# Patient Record
Sex: FEMALE | Race: WHITE | HISPANIC OR LATINO | Employment: UNEMPLOYED | URBAN - METROPOLITAN AREA
[De-identification: names, ages, dates, MRNs, and addresses within clinical notes are randomized per-mention and may not be internally consistent; named-entity substitution may affect disease eponyms.]

---

## 2017-11-29 ENCOUNTER — ALLSCRIPTS OFFICE VISIT (OUTPATIENT)
Dept: OTHER | Facility: OTHER | Age: 52
End: 2017-11-29

## 2017-11-29 ENCOUNTER — GENERIC CONVERSION - ENCOUNTER (OUTPATIENT)
Dept: OTHER | Facility: OTHER | Age: 52
End: 2017-11-29

## 2017-11-29 DIAGNOSIS — Z12.31 ENCOUNTER FOR SCREENING MAMMOGRAM FOR MALIGNANT NEOPLASM OF BREAST: ICD-10-CM

## 2017-11-29 DIAGNOSIS — N93.8 OTHER SPECIFIED ABNORMAL UTERINE AND VAGINAL BLEEDING: ICD-10-CM

## 2017-11-29 DIAGNOSIS — Z13.820 ENCOUNTER FOR SCREENING FOR OSTEOPOROSIS: ICD-10-CM

## 2017-11-30 ENCOUNTER — GENERIC CONVERSION - ENCOUNTER (OUTPATIENT)
Dept: OTHER | Facility: OTHER | Age: 52
End: 2017-11-30

## 2017-11-30 LAB
A/G RATIO (HISTORICAL): 1.9 (ref 1.2–2.2)
ALBUMIN SERPL BCP-MCNC: 4.7 G/DL (ref 3.5–5.5)
ALP SERPL-CCNC: 49 IU/L (ref 39–117)
ALT SERPL W P-5'-P-CCNC: 16 IU/L (ref 0–32)
AST SERPL W P-5'-P-CCNC: 20 IU/L (ref 0–40)
BILIRUB SERPL-MCNC: 0.3 MG/DL (ref 0–1.2)
BUN SERPL-MCNC: 9 MG/DL (ref 6–24)
BUN/CREA RATIO (HISTORICAL): 11 (ref 9–23)
C-REACT.PROTEIN,QUANT (HISTORICAL): <0.3 MG/L (ref 0–4.9)
CALCIUM SERPL-MCNC: 9.5 MG/DL (ref 8.7–10.2)
CHLORIDE SERPL-SCNC: 100 MMOL/L (ref 96–106)
CHOLEST SERPL-MCNC: 198 MG/DL (ref 100–199)
CHOLEST/HDLC SERPL: 2.3 RATIO UNITS (ref 0–4.4)
CO2 SERPL-SCNC: 28 MMOL/L (ref 18–29)
CREAT SERPL-MCNC: 0.81 MG/DL (ref 0.57–1)
DEPRECATED RDW RBC AUTO: 13.1 % (ref 12.3–15.4)
EGFR AFRICAN AMERICAN (HISTORICAL): 97 ML/MIN/1.73
EGFR-AMERICAN CALC (HISTORICAL): 84 ML/MIN/1.73
ERYTHROCYTE SEDIMENTATION RATE (HISTORICAL): 4 MM/HR (ref 0–40)
GLUCOSE SERPL-MCNC: 89 MG/DL (ref 65–99)
HCT VFR BLD AUTO: 42.7 % (ref 34–46.6)
HDLC SERPL-MCNC: 85 MG/DL
HGB BLD-MCNC: 14.6 G/DL (ref 11.1–15.9)
LDLC SERPL CALC-MCNC: 98 MG/DL (ref 0–99)
MCH RBC QN AUTO: 30.2 PG (ref 26.6–33)
MCHC RBC AUTO-ENTMCNC: 34.2 G/DL (ref 31.5–35.7)
MCV RBC AUTO: 88 FL (ref 79–97)
PLATELET # BLD AUTO: 349 X10E3/UL (ref 150–379)
POTASSIUM SERPL-SCNC: 3.7 MMOL/L (ref 3.5–5.2)
RBC (HISTORICAL): 4.84 X10E6/UL (ref 3.77–5.28)
SODIUM SERPL-SCNC: 138 MMOL/L (ref 134–144)
TOT. GLOBULIN, SERUM (HISTORICAL): 2.5 G/DL (ref 1.5–4.5)
TOTAL PROTEIN (HISTORICAL): 7.2 G/DL (ref 6–8.5)
TRIGL SERPL-MCNC: 73 MG/DL (ref 0–149)
TSH SERPL DL<=0.05 MIU/L-ACNC: 0.69 UIU/ML (ref 0.45–4.5)
VLDLC SERPL CALC-MCNC: 15 MG/DL (ref 5–40)
WBC # BLD AUTO: 5 X10E3/UL (ref 3.4–10.8)

## 2017-12-07 ENCOUNTER — HOSPITAL ENCOUNTER (OUTPATIENT)
Dept: RADIOLOGY | Facility: HOSPITAL | Age: 52
Discharge: HOME | End: 2017-12-07
Payer: COMMERCIAL

## 2017-12-07 DIAGNOSIS — Z12.31 ENCOUNTER FOR SCREENING MAMMOGRAM FOR MALIGNANT NEOPLASM OF BREAST: ICD-10-CM

## 2017-12-07 PROCEDURE — G0202 SCR MAMMO BI INCL CAD: HCPCS

## 2017-12-10 ENCOUNTER — GENERIC CONVERSION - ENCOUNTER (OUTPATIENT)
Dept: OTHER | Facility: OTHER | Age: 52
End: 2017-12-10

## 2018-01-10 NOTE — RESULT NOTES
Discussion/Summary   LEONORA,      REVIEWED BW RESULTS   ALL LOOKED GREAT!! KEEP UP THE GOOD WORK      DR GUO     Verified Results  (1) CBC/ PLT (NO DIFF) [id number] 12:00AM [facility]     Test Name Result Flag Reference   WBC 5 0 x10E3/uL  3 4-10 8   RBC 4 84 x10E6/uL  3 77-5 28   Hemoglobin 14 6 g/dL  11 1-15 9   **Effective December 4, 2017 the reference interval**                   for Hemoglobin MALES only will be changing to: Males 13-15 years: 12 6 - 17 7                                         Males   >15 years: 13 0 - 17 7   Hematocrit 42 7 %  34 0-46  6   MCV 88 fL  79-97   MCH 30 2 pg  26 6-33 0   MCHC 34 2 g/dL  31 5-35 7   RDW 13 1 %  12 3-15 4   Platelets 446 N36I2/KO  150-379     (1) COMPREHENSIVE METABOLIC PANEL [id number] 86:67FA [facility]     Test Name Result Flag Reference   Glucose, Serum 89 mg/dL  65-99   BUN 9 mg/dL  6-24   Creatinine, Serum 0 81 mg/dL  0 57-1 00   BUN/Creatinine Ratio 11  9-23   Sodium, Serum 138 mmol/L  134-144   Potassium, Serum 3 7 mmol/L  3 5-5 2   Chloride, Serum 100 mmol/L     Carbon Dioxide, Total 28 mmol/L  18-29   Calcium, Serum 9 5 mg/dL  8 7-10 2   Protein, Total, Serum 7 2 g/dL  6 0-8 5   Albumin, Serum 4 7 g/dL  3 5-5 5   Globulin, Total 2 5 g/dL  1 5-4 5   A/G Ratio 1 9  1 2-2 2   Bilirubin, Total 0 3 mg/dL  0 0-1 2   Alkaline Phosphatase, S 49 IU/L     AST (SGOT) 20 IU/L  0-40   ALT (SGPT) 16 IU/L  0-32   eGFR If NonAfricn Am 84 mL/min/1 73  >59   eGFR If Africn Am 97 mL/min/1 73  >59     (1) C-REACTIVE PROTEIN [id number] 12:00AM [facility]     Test Name Result Flag Reference   C-Reactive Protein, Quant <0 3 mg/L  0 0-4 9     (1) LIPID PANEL, FASTING [id number] 12:00AM [facility]     Test Name Result Flag Reference   Cholesterol, Total 198 mg/dL  100-199   Triglycerides 73 mg/dL  0-149   HDL Cholesterol 85 mg/dL  >39   VLDL Cholesterol [place] 15 mg/dL  5-40   LDL Cholesterol Calc 98 mg/dL  0-99 T  Chol/HDL Ratio 2 3 ratio units  0 0-4 4   T  Chol/HDL Ratio                                                             Men  Women                                               1/2 Avg  Risk  3 4    3 3                                                   Avg Risk  5 0    4 4                                                2X Avg  Risk  9 6    7 1                                                3X Avg  Risk 23 4   11 0     (1) TSH [id number] 12:00[facility]     Test Name Result Flag Reference   TSH 0 689 uIU/mL  0 450-4 500     General acute hospital) Sedimentation Rate-Westergren [id number] 12:00AM [facility]     Test Name Result Flag Reference   Sedimentation Rate-Westergren 4 mm/hr  0-40

## 2018-01-17 NOTE — PROGRESS NOTES
Assessment    1  Encounter for preventive health examination (V70 0) (Z00 00)   2  Screening for hypertension (V81 1) (Z13 6)   3  Screening for hyperlipidemia (V77 91) (Z13 220)   4  Screening for hypothyroidism (V77 0) (Z13 29)   5  Special screening for malignant neoplasm of colon (V76 51) (Z12 11)   6  Encounter for screening mammogram for breast cancer (V76 12) (Z12 31)   7  Screening for osteoporosis (V82 81) (Z13 820)   8  Dysfunctional uterine bleeding (626 8) (N93 8)    Plan  Dysfunctional uterine bleeding    · US PELVIS COMPLETE NON OB; Status:Hold For - Scheduling; Requested  for:29Nov2017;   Encounter for screening mammogram for breast cancer    · * MAMMO SCREENING BILATERAL W CAD; Status:Hold For - Scheduling; Requested  for:29Nov2017; Health Maintenance    · Always use a seat belt and shoulder strap when riding or driving a motor vehicle ;  Status:Complete;   Done: 87FQZ3769   · Begin a limited exercise program ; Status:Complete;   Done: 70GJZ6969   · Drink plenty of fluids ; Status:Complete;   Done: 73QLS7796   · Eat a low fat and low cholesterol diet ; Status:Complete;   Done: 27UDM0504   · Eat a normal well-balanced diet ; Status:Complete;   Done: 58FEH6566   · Use a sun block product with an SPF of 15 or more ; Status:Complete;   Done:  72MKK0029   · We encourage all of our patients to exercise regularly  30 minutes of exercise or physical  activity five or more days a week is recommended for children and adults ;  Status:Complete;   Done: 99WVS3236   · We recommend routine visits to a dentist ; Status:Complete;   Done: 84ATR2396   · We recommend that you bring your body mass index down to 26 ; Status:Complete;    Done: 35GKX6226   · We recommend that you follow these steps to lower your risk of osteoporosis  ;  Status:Complete;   Done: 75IEW3029   · Follow-up visit in 1 year Evaluation and Treatment  Follow-up  Status: Complete  Done:  41GAN4217  Health Maintenance, Screening for hyperlipidemia, Screening for hypertension,  Screening for hypothyroidism    · (1) CBC/ PLT (NO DIFF); Status: In Progress - Specimen/Data Collected;   Done:  05IPD3992   · (1) COMPREHENSIVE METABOLIC PANEL; Status: In Progress - Specimen/Data  Collected;   Done: 00WEL4095   · (1) C-REACTIVE PROTEIN; Status: In Progress - Specimen/Data Collected;   Done:  21TIQ4591   · (1) LIPID PANEL, FASTING; Status: In Progress - Specimen/Data Collected;   Done:  89XMD2311   · (1) SED RATE; Status: In Progress - Specimen/Data Collected;   Done: 26SMH4814   · (1) TSH; Status: In Progress - Specimen/Data Collected;   Done: 14MNR7086   · EKG/ECG- POC; Status:Active - Perform Order; Requested for:29Nov2017;    · Routine Venipuncture - POC; Status:Complete;   Done: 64RBX0004   · Urine Dip Automated- POC; Status:Active - Perform Order; Requested for:29Nov2017;   Screening for osteoporosis    · DXA BODY COMP ANALYSIS; Status:Hold For - Scheduling; Requested for:29Nov2017;   Situational anxiety    · From  Xanax 0 25 MG Oral Tablet  To ALPRAZolam 0 25 MG Oral Tablet  (Xanax) TAKE 1 TABLET BY MOUTH THREE TIMES DAILY AS NEEDED  Special screening for malignant neoplasm of colon    · (1) Avril Brewer; Status:Active; Requested for:29Nov2017;   cont  : [facility] to obtain reimbursement for      Cologuard and to directly contact and collect a second sample from the paient      if reportable results are not obtained from the initial sample   cont  : I accept responsibility for maintaining the privacy of test results and      related information as required by HIPAA   : By ordering Cologuard, I certify that I am a licensed medical professional      authorized to order Cologuard  I acknowledge that the test is medically necessary and      that the patient is eligible to use Cologuard  Discussion/Summary  health maintenance visit Currently, she eats a healthy diet   the risks and benefits of cervical cancer screening were discussed Breast cancer screening: the risks and benefits of breast cancer screening were discussed and mammogram has been ordered  Colorectal cancer screening: the risks and benefits of colorectal cancer screening were discussed  Osteoporosis screening: bone mineral density testing has been ordered  Screening lab work includes hemoglobin, glucose, lipid profile, thyroid function testing and urinalysis  Advice and education were given regarding sunscreen use and seat belt use  DISCUSSED HEALTH ISSUES  HEALTHY DIET AND EXERCISE  BW WILL BE OBTAINED  MAMMOGRAPHY   RECOMMEND CALCIUM 8887-4267 MG DAILY  VITAMIN D3 1000 IU DAILY  RV IN 1 YEAR FOR ANNUAL EXAM, SOONER IF NEEDED    The treatment plan was reviewed with the patient/guardian  The patient/guardian understands and agrees with the treatment plan      History of Present Illness  HM, Adult Female: The patient is being seen for a health maintenance evaluation  General Health: The patient's health since the last visit is described as good  She has regular dental visits  She denies vision problems  She denies hearing loss  Lifestyle:  She consumes a diverse and healthy diet  She does not have any weight concerns  She does not exercise regularly  She does not use tobacco  She denies alcohol use  She denies drug use  Screening: cancer screening reviewed and current  metabolic screening reviewed and current  risk screening reviewed and current  HPI: [address] [place] [address] Rd RECORD  MENSTRUAL ISSUES      Review of Systems    Constitutional: no fever, no chills and not feeling tired  Eyes: no eyesight problems  ENT: no sore throat and no nasal discharge  Cardiovascular: no chest pain, the heart rate was not fast, no palpitations and no lower extremity edema  Respiratory: no shortness of breath, no cough, no wheezing and no shortness of breath during exertion     Gastrointestinal: no abdominal pain, no nausea, no vomiting and no diarrhea  Genitourinary: unexplained vaginal bleeding, but no dysuria, no vaginal discharge and no dysmenorrhea  Musculoskeletal: joint stiffness, but no arthralgias  Integumentary: no rashes  Neurological: no headache, no numbness, no tingling and no dizziness  Psychiatric: no anxiety  Endocrine: no muscle weakness  Hematologic/Lymphatic: no swollen glands  ROS reviewed  Active Problems    1  Screening for hyperlipidemia (V77 91) (Z13 220)   2  Screening for hypertension (V81 1) (Z13 6)   3  Screening for hypothyroidism (V77 0) (Z13 29)    Past Medical History    · History of Celiac disease (579 0) (K90 0)    Family History  Mother    · No pertinent family history  Family History    · Family history of cardiac disorder (V17 49) (Z82 49)   · Family history of diabetes mellitus (V18 0) (Z83 3)   · Family history of malignant neoplasm (V16 9) (Z80 9)    Social History    · Alcohol use (V49 89) (Z78 9)   · Former smoker (E33 17) (R58 467)   ·     Current Meds   1  Vitamin B12 TABS; Therapy: (Recorded:11Qwy2451) to Recorded   2  Xanax 0 25 MG Oral Tablet; Therapy: (Recorded:51Vjh7221) to Recorded    Allergies    1  No Known Drug Allergies    2  Wheat    Vitals   Recorded: 75DLU9125 02:48PM   Temperature 99 6 F   Heart Rate 76   Respiration 16   Systolic 516   Diastolic 80   Height 5 ft 4 in   Weight 170 lb    BMI Calculated 29 18   BSA Calculated 1 83     Physical Exam    Constitutional   General appearance: No acute distress, well appearing and well nourished  Head and Face   Head and face: Normal     Eyes   Conjunctiva and lids: No swelling, erythema or discharge  Pupils and irises: Equal, round, reactive to light  Ophthalmoscopic examination: Normal fundi and optic discs  Ears, Nose, Mouth, and Throat   External inspection of ears and nose: Normal     Otoscopic examination: Tympanic membranes translucent with normal light reflex  Canals patent without erythema  Hearing: Normal     Nasal mucosa, septum, and turbinates: Normal without edema or erythema  Oropharynx: Normal with no erythema, edema, exudate or lesions  Neck   Neck: Supple, symmetric, trachea midline, no masses  Thyroid: Normal, no thyromegaly  Pulmonary   Respiratory effort: No increased work of breathing or signs of respiratory distress  Auscultation of lungs: Clear to auscultation  Cardiovascular   Auscultation of heart: Normal rate and rhythm, normal S1 and S2, no murmurs  Carotid pulses: 2+ bilaterally  Abdominal aorta: Normal     Femoral pulses: 2+ bilaterally  Pedal pulses: 2+ bilaterally  Peripheral vascular exam: Normal     Examination of extremities for edema and/or varicosities: Normal     Chest   Palpation of breasts and axillae: Normal, no masses palpated  Abdomen   Abdomen: Non-tender, no masses  Liver and spleen: No hepatomegaly or splenomegaly  Examination for hernias: No hernia appreciated  Lymphatic   Palpation of lymph nodes in neck: No lymphadenopathy  Palpation of lymph nodes in axillae: No lymphadenopathy  Palpation of lymph nodes in groin: No lymphadenopathy  Musculoskeletal   Gait and station: Normal     Digits and nails: Normal without clubbing or cyanosis  Joints, bones, and muscles: Normal     Range of motion: Normal     Stability: Normal     Muscle strength/tone: Normal     Skin   Skin and subcutaneous tissue: Normal without rashes or lesions  Neurologic   Cranial nerves: Cranial nerves II-XII intact  Cortical function: Normal mental status  Reflexes: 2+ and symmetric  Sensation: No sensory loss  Coordination: Normal finger to nose and heel to shin      Psychiatric   Judgment and insight: Normal     Orientation to person, place, and time: Normal     Mood and affect: Normal        Signatures   Electronically signed by : Starla Arciniega MD; Nov 29 2017  3:35PM EST                       (Author)

## 2018-01-22 VITALS
WEIGHT: 170 LBS | HEART RATE: 76 BPM | TEMPERATURE: 99.6 F | SYSTOLIC BLOOD PRESSURE: 120 MMHG | BODY MASS INDEX: 29.02 KG/M2 | RESPIRATION RATE: 16 BRPM | DIASTOLIC BLOOD PRESSURE: 80 MMHG | HEIGHT: 64 IN

## 2018-01-23 NOTE — RESULT NOTES
Verified Results  * MAMMO SCREENING BILATERAL W CAD 30GPK5382 06:46PM Hector Lora Order Number: [id number]    - Patient Instructions: To schedule this appointment, please contact Central Scheduling at [phone]  Do not wear any perfume, powder, lotion or deodorant on breast or underarm area  Please bring your doctors order, referral (if needed) and insurance information with you on the day of the test  Failure to bring this information may result in this test being rescheduled  Arrive 15 minutes prior to your appointment time to register  On the day of your test, please bring any prior mammogram or breast studies with you that were not performed at a [facility]  Failure to bring prior exams may result in your test needing to be rescheduled  Test Name Result Flag Reference   MAMMO SCREENING BILATERAL W CAD (Report)     Patient History:   No known family history of cancer  Reason for exam: screening, asymptomatic  Screening     Mammo Screening Bilateral W CAD: December 7, 2017 - Check In #:    [de-identified]   Bilateral CC and MLO view(s) were taken  Technologist: LYNNE Pop   Prior study comparison: August 24, 2012, left breast unilateral    diagnostic mammogram, performed at [address] [place] [address]  August 22, 2012, bilateral digital screening mammogram,    performed at [address] [place] [address]      The breast tissue is heterogeneously dense, potentially limiting    the sensitivity of mammography  Patient risk, included in this    report, assists in determining the appropriate screening regimen    (such as 3-D mammography or the inclusion of automated breast    ultrasound or MRI)  3-D mammography may also remain indicated as    screening  No dominant soft tissue mass, architectural    distortion or suspicious calcifications are noted  The skin and    nipple contours are within normal limits  No evidence of    malignancy   No significant change when compared to the prior    studies  1  No evidence of malignancy  2  No significant change when compared with the prior study  ACR BI-RADSï¾® Assessments: BiRad:1 - Negative     Recommendation:   Routine screening mammogram of both breasts in 1 year  Analyzed by CAD     The patient is scheduled in a reminder system for screening    mammography  8-10% of cancers will be missed on mammography  Management of a    palpable abnormality must be based on clinical grounds  Patients   will be notified of their results via letter from our facility  Accredited by [employer] of Radiology and FDA       Transcription Location: [facility] 98: [id number]     Risk Value(s):   Tyrer-Cuzick 10 Year: 2 700%, Tyrer-Cuzick Lifetime: 10 600%,    Myriad Table: 1 5%, MANJEET 5 Year: 1 2%, NCI Lifetime: 9 6%   Signed by:   Galilea Lenz MD   12/8/17

## 2018-03-07 NOTE — CONSULTS
Plan    1  Follow-up PRN Evaluation and Treatment  Follow-up  Status: Complete  Done:   66QYM6190    2  *1 -  AUDIOLOGY [facility] Physician Referral  Consult  Status: Hold For -   Scheduling  Requested for: 72VVU6520  Care Summary provided  : Yes    Assessment    1  Otalgia, left (388 70) (H92 02)   2  TMJ (temporomandibular joint syndrome) (524 60) (M26 60)    Chief Complaint  Chief Complaint Free Text Note Form: Presenting with left ear pain  History of Present Illness  HPI: Ms Ramírez Whitman presents today as a new patient due to otalgia on the left  The pain has been occurring on a daily basis for the past 6 months  She is concerned about fluid or cerumen in her ears  No recent trauma or illnesses  No recent imaging  She states it is a dull discomfort in her left ear with no otorrhea  It tends to intensify when she goes under water  She is unsure if she clenches her teeth during sleep  She was recently to the dentist who informed her that some of her teeth are cracked and need to be repaired  She has not treated the discomfort with medications  She denies other ENT concerns  Review of Systems  Complete ENT ROS [facility]:   Eyes: No complaints of itching, excessive tearing or vision changes  Ears: left ear pain, but No complaints of hearing loss, discharge, imbalance, recent ear infections, or tinnitus  Nose: No nasal obstruction, no discharge or runniness, no bleeding, no dryness, no sneezing and no loss of smell  Mouth: No sores in mouth, no altered taste, no dental problems  Throat: No complaints of throat pain, no difficulty swallowing, no hoarseness  Neck: neck pain  Genitourinary: No complaints of dysuria, flank pain or frequent urination  Cardiovascular: No complaints of chest pain or palpitations  Respiratory: No complaints of shortness of breath, cough or wheezing  Gastrointestinal: No complaints of heartburn, nausea/vomiting, or constipation     Neurological: No complaints of headache, convulsions or memory loss  Constitutional: No fever, feels well, no tiredness  Psychiatric: No anxiety, no depression, no sleep disturbances  Musculoskeletal: No complaints of arthralgias, myalgias or limb pain  Integumentary: No complaints of skin rash, itching or skin lesions  Endocrine: No complaints of proptosis, muscle weakness or feelings of weakness  Hematologic/Lymphatic: No complaints of swollen glands in the neck, does not bleed easily, does not bruise easily  ROS Reviewed:   ROS reviewed  Past Medical History    1  History of Celiac disease (579 0) (K90 0)  Past Medical History Reviewed: The past medical history was reviewed and updated today  Surgical History  Surgical History Reviewed: The surgical history was reviewed and updated today  Family History  Family History    1  Family history of cardiac disorder (V17 49) (Z82 49)   2  Family history of diabetes mellitus (V18 0) (Z83 3)   3  Family history of malignant neoplasm (V16 9) (Z80 9)  Family History Reviewed: The family history was reviewed and updated today  Social History    · Alcohol use (V49 89) (Z78 9)   · Former smoker (O60 17) (Q23 464)   ·   Social History Reviewed: The social history was reviewed and updated today  Current Meds   1  Flonase 50 MCG/ACT SUSP; Therapy: (Recorded:46Eki1362) to Recorded   2  Vitamin B12 TABS; Therapy: (Recorded:87Pku1498) to Recorded    Vitals  Signs   Recorded: 39AUJ8390 04:24PM   Height Unobtainable: Yes  Recorded: 05BSE7052 10:64XY   Systolic: 225  Diastolic: 70  Weight: 880 lb     Physical Exam    Constitutional:   General appearance: Well developed, well nourished  Ability to communicate: Voice normal  Speech normal    Head and Face:   Head and face: Head normocephalic, atraumatic with no lesions or palpable masses  Submandibular glands and parotid glands: non tender, no masses     Eyes:   Test of Ocular Motility: Gaze normal  No nystagmus  Ears:   Otoscopic Examination: Tympanic membranes intact and normal in appearance, no retraction of tympanic membranes observed, no serous effusion observed, no evidence of tympanosclerosis  Hearing: Normal    Nose:   External auditory canals: No cerumen impaction noted, no drainage observed, no edema noted in EAC, no exostoses present, no osteoma present, no tenderness noted  External Inspection of Nose: No deformities observed, no deviation of bone structure, no skin lesion present, no swelling present  Nares are symmetric, no deviation of caudal portion of septum  Nasal Mucosa: No congestion observed, no mucosal lesion or masses present, no ulcerations observed  Cartilaginous Septum: midline, no bleeding noted, no crusting present, no perforation noted  Turbinates: No hypertrophy or inflammation noted  unable to visualize due to gag reflex  Mouth: Inspection of Lips, Teeth, Gums: Lips normal in color, moist, no cracks or lesions  No loose teeth, no missing teeth  Gingiva: no bleeding observed, no inflammation present  Hard Palate: no asymmetry observed, no torus present  Soft palate normal with no ulcers noted  Throat:   Examination of Oropharynx: Oral Mucosa: no masses, lesions, leukoplakia, or scarring  Normal Maren's ducts, pink and moist, no discoloration noted  Floor of mouth: normal Warthin's ducts, no lesions, ulcerations, leukoplakia or torus mandibularis  Tonsils: no hypertrophy or ulcerations noted  Tongue: normal mobility, surfaces without fissures, leukoplakia, ulceration or masses, not enlarged, no pallor noted, no white patches present  unable to visualize due to gag reflex  Neck:   Examination of Neck: No decreased range of motion, trachea midline  Examination of Thyroid: Normal size, non-tender, no palpable masses  Pulmonary:   Respiratory effort: Normal respiratory rate and rhythm, no increased work of breathing     Cardiovascular:   Inspection of Peripheral vascular system by observation: Normal    Lymphatic:   Palpation of Lymph Nodes: Neck: No generalized lymphadenopathy  Neurological/Psychiatric:   Cranial nerves II-VII grossly intact  Oriented to person, place, and time  Cooperative, in no acute distress  Discussion/Summary  Discussion Summary:   Ms Kyle Greene presents today as a new patient due to otalgia on the left  Audiogram completed today and reviewed results with pt  Revealed no hearing loss and tymps normal  No abnormalities noted on exam  No fluid or cerumen impaction  Tenderness on left TMJ on exam  Discussed possible TMJ syndrome  Recommend follow up with dentist for , MRI of temporal bones, or acceptance  Pt agreed to follow up with dentist for cracked tooth repair and possible   Follow up as needed        Signatures   Electronically signed by : WAYNE Baker ; Sep 28 2016  9:42AM EST                       (Author)

## 2019-07-24 ENCOUNTER — OFFICE VISIT (OUTPATIENT)
Dept: FAMILY MEDICINE CLINIC | Facility: CLINIC | Age: 54
End: 2019-07-24
Payer: COMMERCIAL

## 2019-07-24 VITALS
SYSTOLIC BLOOD PRESSURE: 120 MMHG | RESPIRATION RATE: 14 BRPM | HEART RATE: 86 BPM | WEIGHT: 179.8 LBS | HEIGHT: 63 IN | TEMPERATURE: 98.6 F | BODY MASS INDEX: 31.86 KG/M2 | DIASTOLIC BLOOD PRESSURE: 70 MMHG | OXYGEN SATURATION: 97 %

## 2019-07-24 DIAGNOSIS — Z13.220 SCREENING FOR HYPERLIPIDEMIA: ICD-10-CM

## 2019-07-24 DIAGNOSIS — F41.9 ANXIETY: Primary | ICD-10-CM

## 2019-07-24 DIAGNOSIS — Z23 NEED FOR TDAP VACCINATION: ICD-10-CM

## 2019-07-24 DIAGNOSIS — R82.998 LEUKOCYTES IN URINE: ICD-10-CM

## 2019-07-24 DIAGNOSIS — Z12.11 COLON CANCER SCREENING: ICD-10-CM

## 2019-07-24 DIAGNOSIS — Z13.29 SCREENING FOR HYPOTHYROIDISM: ICD-10-CM

## 2019-07-24 DIAGNOSIS — Z12.31 ENCOUNTER FOR SCREENING MAMMOGRAM FOR BREAST CANCER: ICD-10-CM

## 2019-07-24 DIAGNOSIS — Z00.00 ROUTINE GENERAL MEDICAL EXAMINATION AT A HEALTH CARE FACILITY: Primary | ICD-10-CM

## 2019-07-24 DIAGNOSIS — Z13.6 SCREENING FOR HYPERTENSION: ICD-10-CM

## 2019-07-24 PROBLEM — G56.00 CARPAL TUNNEL SYNDROME: Status: ACTIVE | Noted: 2019-07-24

## 2019-07-24 PROBLEM — D48.0 NEOPLASM OF UNCERTAIN BEHAVIOR OF BONE: Status: ACTIVE | Noted: 2019-07-24

## 2019-07-24 PROBLEM — S46.009A INJURY OF TENDON OF ROTATOR CUFF: Status: ACTIVE | Noted: 2019-07-24

## 2019-07-24 LAB
SL AMB  POCT GLUCOSE, UA: 0
SL AMB LEUKOCYTE ESTERASE,UA: ABNORMAL
SL AMB POCT BILIRUBIN,UA: 0
SL AMB POCT BLOOD,UA: ABNORMAL
SL AMB POCT CLARITY,UA: ABNORMAL
SL AMB POCT COLOR,UA: YELLOW
SL AMB POCT FECES OCC BLD: NORMAL
SL AMB POCT KETONES,UA: 0
SL AMB POCT NITRITE,UA: 0
SL AMB POCT PH,UA: 7
SL AMB POCT SPECIFIC GRAVITY,UA: 1.01
SL AMB POCT URINE PROTEIN: ABNORMAL
SL AMB POCT UROBILINOGEN: 0

## 2019-07-24 PROCEDURE — 36415 COLL VENOUS BLD VENIPUNCTURE: CPT | Performed by: FAMILY MEDICINE

## 2019-07-24 PROCEDURE — 93000 ELECTROCARDIOGRAM COMPLETE: CPT | Performed by: FAMILY MEDICINE

## 2019-07-24 PROCEDURE — 90471 IMMUNIZATION ADMIN: CPT

## 2019-07-24 PROCEDURE — 81003 URINALYSIS AUTO W/O SCOPE: CPT | Performed by: FAMILY MEDICINE

## 2019-07-24 PROCEDURE — 99396 PREV VISIT EST AGE 40-64: CPT | Performed by: FAMILY MEDICINE

## 2019-07-24 PROCEDURE — 82270 OCCULT BLOOD FECES: CPT | Performed by: FAMILY MEDICINE

## 2019-07-24 PROCEDURE — 90715 TDAP VACCINE 7 YRS/> IM: CPT

## 2019-07-24 RX ORDER — FLUTICASONE PROPIONATE 50 MCG
SPRAY, SUSPENSION (ML) NASAL AS NEEDED
COMMUNITY
End: 2020-09-09 | Stop reason: ALTCHOICE

## 2019-07-24 RX ORDER — ALPRAZOLAM 0.25 MG/1
1 TABLET ORAL 3 TIMES DAILY PRN
COMMUNITY
End: 2019-07-24 | Stop reason: SDUPTHER

## 2019-07-24 RX ORDER — UBIDECARENONE 75 MG
CAPSULE ORAL
COMMUNITY
End: 2021-09-14

## 2019-07-24 RX ORDER — ALPRAZOLAM 0.25 MG/1
0.25 TABLET ORAL 3 TIMES DAILY PRN
Qty: 30 TABLET | Refills: 0 | Status: SHIPPED | OUTPATIENT
Start: 2019-07-24 | End: 2021-09-14

## 2019-07-24 NOTE — PATIENT INSTRUCTIONS
DISCUSSED HEALTH ISSUES  HEALTHY DIET AND EXERCISE  BW WILL BE OBTAINED  MAMMOGRAPHY   RECOMMEND CALCIUM 3057-9581 MG DAILY  VITAMIN D3  1000 IU DAILY  RV IN 1 YEAR FOR ANNUAL EXAM, SOONER IF NEEDED

## 2019-07-24 NOTE — PROGRESS NOTES
FAMILY PRACTICE HEALTH MAINTENANCE OFFICE VISIT  [facility] - [facility]    NAME: Lizeth Leyva  AGE: 48 y o  SEX: female  : 1965     DATE: 2019    Assessment and Plan     Problem List Items Addressed This Visit        Other    Colon cancer screening    Relevant Orders    POCT hemoccult screening (Completed)    Cologuard    Encounter for screening mammogram for breast cancer    Relevant Orders    Mammo screening bilateral w cad    Screening for hypothyroidism    Relevant Orders    TSH, 3rd generation    Screening for hyperlipidemia    Relevant Orders    TSH, 3rd generation    Lipid panel    Screening for hypertension    Relevant Orders    Comprehensive metabolic panel    POCT urine dip auto non-scope (Completed)    POCT ECG (Completed)    Routine general medical examination at a health care facility - Primary    Relevant Orders    TSH, 3rd generation    CBC and differential    Comprehensive metabolic panel    Lipid panel    POCT hemoccult screening (Completed)    POCT urine dip auto non-scope (Completed)    Mammo screening bilateral w cad    POCT ECG (Completed)      Other Visit Diagnoses     Leukocytes in urine        Relevant Orders    Urine culture               Return in about 1 year (around 2020) for Annual physical         Chief Complaint     Chief Complaint   Patient presents with    Annual Exam    Gynecologic Exam     LMP: 19-irregular & heavy    cologuard    Immunizations     TDAP       History of Present Illness     DISCUSSED HEALTH ISSUES  REVIEWED MEDICAL RECORD  NO CONCERNS AT THIS TIME        Well Adult Physical   Patient here for a comprehensive physical exam       Diet and Physical Activity  Diet: well balanced diet  Weight concerns: Patient has class 1 obesity (BMI 30-34  9)  Exercise: infrequently      Depression Screen  PHQ-9 Depression Screening    PHQ-9:    Frequency of the following problems over the past two weeks: Little interest or pleasure in doing things:  0 - not at all  Feeling down, depressed, or hopeless:  0 - not at all  PHQ-2 Score:  0          General Health  Hearing: Normal:  bilateral  Vision: no vision problems  Dental: regular dental visits    Reproductive Health          The following portions of the patient's history were reviewed and updated as appropriate: allergies, current medications, past family history, past medical history, past social history, past surgical history and problem list     Review of Systems     Review of Systems   Constitutional: Negative for chills, fatigue and fever  HENT: Negative for congestion, ear discharge, ear pain, mouth sores, postnasal drip, sore throat and trouble swallowing  Eyes: Negative for pain, discharge and visual disturbance  Respiratory: Negative for cough, shortness of breath and wheezing  Cardiovascular: Negative for chest pain, palpitations and leg swelling  Gastrointestinal: Negative for abdominal distention, abdominal pain, blood in stool, diarrhea and nausea  Endocrine: Negative for polydipsia, polyphagia and polyuria  Genitourinary: Negative for dysuria, frequency, hematuria and urgency  Musculoskeletal: Negative for arthralgias, gait problem and joint swelling  Skin: Negative for pallor and rash  Neurological: Negative for dizziness, syncope, speech difficulty, weakness, light-headedness, numbness and headaches  Hematological: Negative for adenopathy  Psychiatric/Behavioral: Negative for behavioral problems, confusion and sleep disturbance  The patient is not nervous/anxious          Past Medical History     Past Medical History:   Diagnosis Date    Celiac disease     last assessed: 9/27/2016       Past Surgical History     Past Surgical History:   Procedure Laterality Date    ROTATOR CUFF REPAIR  2017       Social History     Social History     Socioeconomic History    Marital status: /Civil Union     Spouse name: None    Number of children: None    Years of education: None    Highest education level: None   Occupational History    None   Social Needs    Financial resource strain: None    Food insecurity:     Worry: None     Inability: None    Transportation needs:     Medical: None     Non-medical: None   Tobacco Use    Smoking status: Former Smoker    Smokeless tobacco: Never Used   Substance and Sexual Activity    Alcohol use: Yes     Comment: social    Drug use: Not Currently    Sexual activity: None   Lifestyle    Physical activity:     Days per week: None     Minutes per session: None    Stress: None   Relationships    Social connections:     Talks on phone: None     Gets together: None     Attends [employer] service: None     Active member of club or organization: None     Attends meetings of clubs or organizations: None     Relationship status: None    Intimate partner violence:     Fear of current or ex partner: None     Emotionally abused: None     Physically abused: None     Forced sexual activity: None   Other Topics Concern    None   Social History Narrative    None       Family History     Family History   Problem Relation Age of Onset    No Known Problems Mother     Heart disease Family     Diabetes Family     Cancer Family     Cancer Father         bladder    Dementia Father     Depression Father     Substance Abuse Neg Hx     Mental illness Neg Hx        Current Medications       Current Outpatient Medications:     ALPRAZolam (XANAX) 0 25 mg tablet, Take 1 tablet by mouth 3 (three) times a day as needed, Disp: , Rfl:     cyanocobalamin (VITAMIN B-12) 100 mcg tablet, Take by mouth, Disp: , Rfl:     fluticasone (FLONASE ALLERGY RELIEF) 50 mcg/act nasal spray, as needed , Disp: , Rfl:      Allergies     Allergies   Allergen Reactions    Other Diarrhea, Vomiting and Dizziness     Oats, barley, rye    Wheat Bran Vomiting       Objective     /70 (BP Location: Left arm, Patient Position: Sitting, Cuff Size: Large)   Pulse 86   Temp 98 6 °F (37 °C) (Temporal)   Resp 14   Ht 5' 3" (1 6 m)   Wt 81 6 kg (179 lb 12 8 oz)   SpO2 97%   BMI 31 85 kg/m²      Physical Exam   Constitutional: She is oriented to person, place, and time  She appears well-developed and well-nourished  HENT:   Head: Normocephalic and atraumatic  Right Ear: External ear normal    Left Ear: External ear normal    Nose: Nose normal    Mouth/Throat: Oropharynx is clear and moist    Eyes: Pupils are equal, round, and reactive to light  Conjunctivae and EOM are normal  Right eye exhibits no discharge  Left eye exhibits no discharge  Neck: Normal range of motion  Neck supple  No thyromegaly present  Cardiovascular: Normal rate, regular rhythm, normal heart sounds and intact distal pulses  No murmur heard  Pulmonary/Chest: Effort normal and breath sounds normal  She has no wheezes  She has no rales  No breast tenderness or discharge  Abdominal: Soft  Bowel sounds are normal  She exhibits no distension and no mass  There is no tenderness  There is no rebound and no guarding  Genitourinary: Vagina normal and uterus normal  Rectal exam shows guaiac negative stool  No breast tenderness or discharge  No vaginal discharge found  Musculoskeletal: Normal range of motion  She exhibits no edema, tenderness or deformity  Lymphadenopathy:     She has no cervical adenopathy  Neurological: She is alert and oriented to person, place, and time  She has normal reflexes  No cranial nerve deficit  She exhibits normal muscle tone  Coordination normal    Skin: Skin is warm and dry  No rash noted  No erythema  Psychiatric: She has a normal mood and affect  Her behavior is normal  Judgment and thought content normal    Vitals reviewed          No exam data present    Health Maintenance     Health Maintenance   Topic Date Due    Hepatitis C Screening  1965    CRC Screening: Colonoscopy  1965    BMI: Followup Plan 11/01/1983    DTaP,Tdap,and Td Vaccines (1 - Tdap) 07/20/2006    MAMMOGRAM  12/07/2018    INFLUENZA VACCINE  07/01/2019    Depression Screening PHQ  07/24/2020    BMI: Adult  07/24/2020    Pneumococcal Vaccine: 65+ Years (1 of 2 - PCV13) 11/01/2030    Pneumococcal Vaccine: Pediatrics (0 to 5 Years) and At-Risk Patients (6 to 59 Years)  Aged Out    HEPATITIS B VACCINES  Aged [employer] History   Administered Date(s) Administered    DT (pediatric) 07/19/2006    H1N1, All Formulations 10/23/2009       Pankaj Greenberg MD   [facility]  BMI Counseling: Body mass index is 31 85 kg/m²  Discussed the patient's BMI with her  The BMI is above average  BMI counseling and education was provided to the patient  Nutrition recommendations include decreasing overall calorie intake  Exercise recommendations include exercising 3-5 times per week

## 2019-07-25 LAB
ALBUMIN SERPL-MCNC: 4.7 G/DL (ref 3.5–5.5)
ALBUMIN/GLOB SERPL: 1.7 {RATIO} (ref 1.2–2.2)
ALP SERPL-CCNC: 59 IU/L (ref 39–117)
ALT SERPL-CCNC: 22 IU/L (ref 0–32)
AST SERPL-CCNC: 22 IU/L (ref 0–40)
BACTERIA UR CULT: NORMAL
BASOPHILS # BLD AUTO: 0 X10E3/UL (ref 0–0.2)
BASOPHILS NFR BLD AUTO: 1 %
BILIRUB SERPL-MCNC: 0.4 MG/DL (ref 0–1.2)
BUN SERPL-MCNC: 11 MG/DL (ref 6–24)
BUN/CREAT SERPL: 14 (ref 9–23)
CALCIUM SERPL-MCNC: 9.7 MG/DL (ref 8.7–10.2)
CHLORIDE SERPL-SCNC: 103 MMOL/L (ref 96–106)
CHOLEST SERPL-MCNC: 184 MG/DL (ref 100–199)
CHOLEST/HDLC SERPL: 2.4 RATIO (ref 0–4.4)
CO2 SERPL-SCNC: 26 MMOL/L (ref 20–29)
CREAT SERPL-MCNC: 0.78 MG/DL (ref 0.57–1)
EOSINOPHIL # BLD AUTO: 0.1 X10E3/UL (ref 0–0.4)
EOSINOPHIL NFR BLD AUTO: 1 %
ERYTHROCYTE [DISTWIDTH] IN BLOOD BY AUTOMATED COUNT: 13.4 % (ref 12.3–15.4)
GLOBULIN SER-MCNC: 2.7 G/DL (ref 1.5–4.5)
GLUCOSE SERPL-MCNC: 86 MG/DL (ref 65–99)
HCT VFR BLD AUTO: 42.1 % (ref 34–46.6)
HDLC SERPL-MCNC: 76 MG/DL
HGB BLD-MCNC: 14.7 G/DL (ref 11.1–15.9)
IMM GRANULOCYTES # BLD: 0 X10E3/UL (ref 0–0.1)
IMM GRANULOCYTES NFR BLD: 0 %
LDLC SERPL CALC-MCNC: 91 MG/DL (ref 0–99)
LYMPHOCYTES # BLD AUTO: 1.3 X10E3/UL (ref 0.7–3.1)
LYMPHOCYTES NFR BLD AUTO: 32 %
Lab: NORMAL
MCH RBC QN AUTO: 29.7 PG (ref 26.6–33)
MCHC RBC AUTO-ENTMCNC: 34.9 G/DL (ref 31.5–35.7)
MCV RBC AUTO: 85 FL (ref 79–97)
MONOCYTES # BLD AUTO: 0.4 X10E3/UL (ref 0.1–0.9)
MONOCYTES NFR BLD AUTO: 9 %
NEUTROPHILS # BLD AUTO: 2.3 X10E3/UL (ref 1.4–7)
NEUTROPHILS NFR BLD AUTO: 57 %
PLATELET # BLD AUTO: 270 X10E3/UL (ref 150–450)
POTASSIUM SERPL-SCNC: 4.1 MMOL/L (ref 3.5–5.2)
PROT SERPL-MCNC: 7.4 G/DL (ref 6–8.5)
RBC # BLD AUTO: 4.95 X10E6/UL (ref 3.77–5.28)
SL AMB EGFR AFRICAN AMERICAN: 100 ML/MIN/1.73
SL AMB EGFR NON AFRICAN AMERICAN: 87 ML/MIN/1.73
SL AMB VLDL CHOLESTEROL CALC: 17 MG/DL (ref 5–40)
SODIUM SERPL-SCNC: 143 MMOL/L (ref 134–144)
TRIGL SERPL-MCNC: 87 MG/DL (ref 0–149)
TSH SERPL DL<=0.005 MIU/L-ACNC: 0.99 UIU/ML (ref 0.45–4.5)
WBC # BLD AUTO: 4.1 X10E3/UL (ref 3.4–10.8)

## 2019-07-26 LAB
CYTOLOGIST CVX/VAG CYTO: NORMAL
DX ICD CODE: NORMAL
OTHER STN SPEC: NORMAL
OTHER STN SPEC: NORMAL
PATH REPORT.FINAL DX SPEC: NORMAL
SL AMB NOTE:: NORMAL
SL AMB SPECIMEN ADEQUACY: NORMAL
SL AMB TEST METHODOLOGY: NORMAL

## 2020-02-16 ENCOUNTER — NURSE TRIAGE (OUTPATIENT)
Dept: OTHER | Facility: OTHER | Age: 55
End: 2020-02-16

## 2020-02-16 NOTE — TELEPHONE ENCOUNTER
Regarding: flu symptoms  ----- Message from Anila An sent at 2/16/2020 10:36 AM EST -----  I feel like I have the flu  Last night I had a fever of 102 0

## 2020-02-16 NOTE — TELEPHONE ENCOUNTER
Reason for Disposition   Cold with no complications   [4] Fever AND [2] no signs of serious infection or localizing symptoms (all other triage questions negative)    Answer Assessment - Initial Assessment Questions  1  TEMPERATURE: "What is the most recent temperature?"  "How was it measured?"      100 oral  2  ONSET: "When did the fever start?"       Last night  3  SYMPTOMS: "Do you have any other symptoms besides the fever?"  (e g , colds, headache, sore throat, earache, cough, rash, diarrhea, vomiting, abdominal pain)      Body ache, dry cough, sore throat and tired  4  CAUSE: If there are no symptoms, ask: "What do you think is causing the fever?"       Possible flu  5  CONTACTS: "Does anyone else in the family have an infection?"     none  6  TREATMENT: "What have you done so far to treat this fever?" (e g , medications)      Tylenol/Motrin / Last took motrin @ 10 am  7  IMMUNOCOMPROMISE: "Do you have of the following: diabetes, HIV positive, splenectomy, cancer chemotherapy, chronic steroid treatment, transplant patient, etc "     none       9  TRAVEL: "Have you traveled out of the country in the last month?" (e g , travel history, exposures)     none    Answer Assessment - Initial Assessment Questions  1  ONSET: "When did the nasal discharge start?"       Minimal nasal discharge  2  AMOUNT: "How much discharge is there?"       minimal  3  COUGH: "Do you have a cough?" If yes, ask: "Describe the color of your sputum" (clear, white, yellow, green)      Dry cough  4  RESPIRATORY DISTRESS: "Describe your breathing "       No distress  5  FEVER: "Do you have a fever?" If so, ask: "What is your temperature, how was it measured, and when did it start?"      100 oral  6  SEVERITY: "Overall, how bad are you feeling right now?" (e g , doesn't interfere with normal activities, staying home from school/work, staying in bed)       She feels very tired and has body ache  7   OTHER SYMPTOMS: "Do you have any other symptoms?" (e g , sore throat, earache, wheezing, vomiting)      Sore throat and cough    Protocols used: COMMON COLD-ADULT-AH, FEVER-ADULT-AH

## 2020-02-17 ENCOUNTER — AMB VIDEO VISIT (OUTPATIENT)
Dept: URGENT CARE | Facility: CLINIC | Age: 55
End: 2020-02-17
Payer: COMMERCIAL

## 2020-02-17 ENCOUNTER — AMB VIDEO VISIT (OUTPATIENT)
Dept: OTHER | Facility: HOSPITAL | Age: 55
End: 2020-02-17

## 2020-02-17 DIAGNOSIS — J11.1 INFLUENZA: Primary | ICD-10-CM

## 2020-02-17 PROCEDURE — 1036F TOBACCO NON-USER: CPT | Performed by: PHYSICIAN ASSISTANT

## 2020-02-17 PROCEDURE — 99213 OFFICE O/P EST LOW 20 MIN: CPT | Performed by: PHYSICIAN ASSISTANT

## 2020-02-17 PROCEDURE — 99499 UNLISTED E&M SERVICE: CPT | Performed by: PHYSICIAN ASSISTANT

## 2020-02-17 PROCEDURE — EVISIT: Performed by: PHYSICIAN ASSISTANT

## 2020-02-17 RX ORDER — OSELTAMIVIR PHOSPHATE 75 MG/1
75 CAPSULE ORAL 2 TIMES DAILY
Qty: 10 CAPSULE | Refills: 0 | Status: SHIPPED | OUTPATIENT
Start: 2020-02-17 | End: 2020-02-22

## 2020-02-17 NOTE — PROGRESS NOTES
[facility]        NAME: Carlos Lawrence is a 47 y o  female  : 1965    MRN: [id number]  DATE: 2020  TIME: 6:12 PM    Assessment and Plan   Influenza [J11 1]  1  Influenza  oseltamivir (TAMIFLU) 75 mg capsule       Video Visit - Carlos Lawrence 47 y o  female MRN: [id number]    REQUIRED DOCUMENTATION:       There were no vitals filed for this visit  1  This service was provided via [facility]  2  Provider located at [address]  [phone]   3  [facility] provider: Paxton Campos PA-C   4  Identify all parties in room with patient during [facility] visit:    5  After connecting through [facility], patient was identified by name and date of birth  Patient was then informed that this was a Telemedicine visit and that the exam was being conducted confidentially over secure lines  My office door was closed  No one else was in the room  Patient acknowledged consent and understanding of privacy and security of the Telemedicine visit  I informed the patient that I have reviewed their record in Epic and presented the opportunity for them to ask any questions regarding the visit today  The patient agreed to participate  Patient Instructions   Influenza:   -The patients history and presentation are consistent with influenza  Will start the patient on Tamiflu 75mg taken as directed  Side effects discussed with the patient in depth    -Stay very well hydrated and rest  It is important to push fluids  We discussed that her dizziness was likely secondary to that she was lying in bed for hours and not staying hydrated, when she suddenly got up she experienced the dizziness  The importance of hydration was stressed to the patient and getting up slowly  -You can take Advil or Tylenol for fever, body aches or pain  -You can use a humidifier next to your bed  Take steam showers     -If your symptoms persist or worsen despite these measures go immediately to the ED  Follow up with Dr Mckeon in the next 2 days  Follow up with PCP in 3-5 days  Proceed to  ER if symptoms worsen  Chief Complaint   No chief complaint on file  History of Present Illness       The patient presents today for a two day hx of fever, chills, myalgias, headache and cough  She states that her sx began acutely with fever and chills  She is now having a productive cough of a yellow sputum with myalgias and continued fever  She states that her Tmax was 102 degrees two days ago  She notes an episode of transient dizziness and near syncope when she got up suddenly from bed to go to the bathroom and became dizzy  She has been taking Advil and Tylenol for her sx with moderate relief  She denies GI sx  She denies dyspnea, wheezing, cp, palpitations, visual changes  She denies sick contacts or recent travel  Review of Systems   Review of Systems   Constitutional: Positive for chills and fever  Negative for activity change, appetite change, diaphoresis and fatigue  HENT: Positive for congestion, postnasal drip and rhinorrhea  Negative for ear discharge, ear pain, facial swelling, hearing loss, sinus pressure, sinus pain, sneezing, sore throat, tinnitus, trouble swallowing and voice change  Eyes: Negative for visual disturbance  Respiratory: Positive for cough  Negative for chest tightness, shortness of breath, wheezing and stridor  Cardiovascular: Negative for chest pain, palpitations and leg swelling  Gastrointestinal: Negative for abdominal pain, diarrhea, nausea and vomiting  Musculoskeletal: Positive for myalgias  Negative for arthralgias, joint swelling, neck pain and neck stiffness  Skin: Negative for rash  Allergic/Immunologic: Negative for immunocompromised state  Neurological: Positive for dizziness and headaches  Negative for weakness, light-headedness and numbness  Hematological: Negative for adenopathy  Current Medications       Current Outpatient Medications:     ALPRAZolam (XANAX) 0 25 mg tablet, Take 1 tablet (0 25 mg total) by mouth 3 (three) times a day as needed for anxiety (as needed for flying), Disp: 30 tablet, Rfl: 0    cyanocobalamin (VITAMIN B-12) 100 mcg tablet, Take by mouth, Disp: , Rfl:     fluticasone (FLONASE ALLERGY RELIEF) 50 mcg/act nasal spray, as needed , Disp: , Rfl:     oseltamivir (TAMIFLU) 75 mg capsule, Take 1 capsule (75 mg total) by mouth 2 (two) times a day for 5 days, Disp: 10 capsule, Rfl: 0    Current Allergies     Allergies as of 02/17/2020 - Reviewed 07/24/2019   Allergen Reaction Noted    Other Diarrhea, Vomiting, and Dizziness 07/24/2019    Wheat bran Vomiting 09/27/2016            The following portions of the patient's history were reviewed and updated as appropriate: allergies, current medications, past family history, past medical history, past social history, past surgical history and problem list      Past Medical History:   Diagnosis Date    Celiac disease     last assessed: 9/27/2016    Chicken pox     AS PER PT       Past Surgical History:   Procedure Laterality Date    ROTATOR CUFF REPAIR  2017       Family History   Problem Relation Age of Onset    No Known Problems Mother     Heart disease Family     Diabetes Family     Cancer Family     Cancer Father         bladder    Dementia Father     Depression Father     Substance Abuse Neg Hx     Mental illness Neg Hx          Medications have been verified  Objective   There were no vitals taken for this visit  Physical Exam     Physical Exam   Constitutional: She appears well-developed and well-nourished  She has a sickly appearance  No distress  Skin: She is not diaphoretic

## 2020-02-17 NOTE — PATIENT INSTRUCTIONS
Influenza:   -The patients history and presentation are consistent with influenza  Will start the patient on Tamiflu 75mg taken as directed  Side effects discussed with the patient in depth    -Stay very well hydrated and rest  It is important to push fluids  We discussed that her dizziness was likely secondary to that she was lying in bed for hours and not staying hydrated, when she suddenly got up she experienced the dizziness  The importance of hydration was stressed to the patient and getting up slowly  -You can take Advil or Tylenol for fever, body aches or pain  -You can use a humidifier next to your bed  Take steam showers  -If your symptoms persist or worsen despite these measures go immediately to the ED  Follow up with Dr Mckeon in the next 2 days

## 2020-02-17 NOTE — CARE ANYWHERE EVISITS
Visit Summary for [facility] - Gender: Female - Date of Birth: [id number]  Date: [id number] - Duration: 5 minutes  Patient: [facility]  Provider: Getachew Shaw PA-C    Patient Contact Information  Address  [place]; [address]      Visit Topics  Flu symptoms Temperature 102, body aches  Coughing and headache  [Added By: Self - 2020-02-17]    Conversation Transcripts  [0A][0A] [Notification] You are connected with Getachew Sahw PA-C, Physician Assistant [0A][Notification] [place] is located in [place]  [0A][Notification] Lisa Rausch has shared health history  Chace [facility]  [0A]    Diagnosis    Procedures  Value: [id number] Code: CPT-4 UNLISTED E&M SERVICE    Medications Prescribed    No prescriptions ordered    Electronically signed by: Tiana Iverson(NPI [id number])

## 2020-08-18 ENCOUNTER — TELEPHONE (OUTPATIENT)
Dept: FAMILY MEDICINE CLINIC | Facility: CLINIC | Age: 55
End: 2020-08-18

## 2020-08-28 DIAGNOSIS — Z13.220 SCREENING FOR HYPERLIPIDEMIA: ICD-10-CM

## 2020-08-28 DIAGNOSIS — Z11.4 SCREENING FOR HIV (HUMAN IMMUNODEFICIENCY VIRUS): ICD-10-CM

## 2020-08-28 DIAGNOSIS — Z13.6 SCREENING FOR HYPERTENSION: ICD-10-CM

## 2020-08-28 DIAGNOSIS — Z13.29 SCREENING FOR HYPOTHYROIDISM: ICD-10-CM

## 2020-08-28 DIAGNOSIS — Z11.59 NEED FOR HEPATITIS C SCREENING TEST: ICD-10-CM

## 2020-08-28 DIAGNOSIS — Z00.00 ROUTINE GENERAL MEDICAL EXAMINATION AT A HEALTH CARE FACILITY: Primary | ICD-10-CM

## 2020-08-29 DIAGNOSIS — Z13.29 SCREENING FOR HYPOTHYROIDISM: ICD-10-CM

## 2020-08-29 DIAGNOSIS — Z13.6 SCREENING FOR HYPERTENSION: ICD-10-CM

## 2020-08-29 DIAGNOSIS — Z00.00 ROUTINE GENERAL MEDICAL EXAMINATION AT A HEALTH CARE FACILITY: Primary | ICD-10-CM

## 2020-08-29 DIAGNOSIS — Z13.220 SCREENING FOR HYPERLIPIDEMIA: ICD-10-CM

## 2020-09-04 LAB
ALBUMIN SERPL-MCNC: 4.7 G/DL (ref 3.8–4.9)
ALBUMIN SERPL-MCNC: 4.7 G/DL (ref 3.8–4.9)
ALBUMIN/GLOB SERPL: 2 {RATIO} (ref 1.2–2.2)
ALBUMIN/GLOB SERPL: 2 {RATIO} (ref 1.2–2.2)
ALP SERPL-CCNC: 55 IU/L (ref 39–117)
ALP SERPL-CCNC: 57 IU/L (ref 39–117)
ALT SERPL-CCNC: 25 IU/L (ref 0–32)
ALT SERPL-CCNC: 25 IU/L (ref 0–32)
AST SERPL-CCNC: 22 IU/L (ref 0–40)
AST SERPL-CCNC: 24 IU/L (ref 0–40)
BASOPHILS # BLD AUTO: 0.1 X10E3/UL (ref 0–0.2)
BASOPHILS # BLD AUTO: 0.1 X10E3/UL (ref 0–0.2)
BASOPHILS NFR BLD AUTO: 1 %
BASOPHILS NFR BLD AUTO: 1 %
BILIRUB SERPL-MCNC: 0.3 MG/DL (ref 0–1.2)
BILIRUB SERPL-MCNC: 0.3 MG/DL (ref 0–1.2)
BUN SERPL-MCNC: 10 MG/DL (ref 6–24)
BUN SERPL-MCNC: 10 MG/DL (ref 6–24)
BUN/CREAT SERPL: 12 (ref 9–23)
BUN/CREAT SERPL: 12 (ref 9–23)
CALCIUM SERPL-MCNC: 9.4 MG/DL (ref 8.7–10.2)
CALCIUM SERPL-MCNC: 9.6 MG/DL (ref 8.7–10.2)
CHLORIDE SERPL-SCNC: 103 MMOL/L (ref 96–106)
CHLORIDE SERPL-SCNC: 103 MMOL/L (ref 96–106)
CHOLEST SERPL-MCNC: 197 MG/DL (ref 100–199)
CHOLEST SERPL-MCNC: 201 MG/DL (ref 100–199)
CHOLEST/HDLC SERPL: 3.2 RATIO (ref 0–4.4)
CHOLEST/HDLC SERPL: 3.3 RATIO (ref 0–4.4)
CO2 SERPL-SCNC: 24 MMOL/L (ref 20–29)
CO2 SERPL-SCNC: 24 MMOL/L (ref 20–29)
CREAT SERPL-MCNC: 0.83 MG/DL (ref 0.57–1)
CREAT SERPL-MCNC: 0.83 MG/DL (ref 0.57–1)
EOSINOPHIL # BLD AUTO: 0.1 X10E3/UL (ref 0–0.4)
EOSINOPHIL # BLD AUTO: 0.1 X10E3/UL (ref 0–0.4)
EOSINOPHIL NFR BLD AUTO: 2 %
EOSINOPHIL NFR BLD AUTO: 2 %
ERYTHROCYTE [DISTWIDTH] IN BLOOD BY AUTOMATED COUNT: 12.2 % (ref 11.7–15.4)
ERYTHROCYTE [DISTWIDTH] IN BLOOD BY AUTOMATED COUNT: 12.4 % (ref 11.7–15.4)
GLOBULIN SER-MCNC: 2.4 G/DL (ref 1.5–4.5)
GLOBULIN SER-MCNC: 2.4 G/DL (ref 1.5–4.5)
GLUCOSE SERPL-MCNC: 91 MG/DL (ref 65–99)
GLUCOSE SERPL-MCNC: 91 MG/DL (ref 65–99)
HCT VFR BLD AUTO: 42.1 % (ref 34–46.6)
HCT VFR BLD AUTO: 42.3 % (ref 34–46.6)
HCV AB S/CO SERPL IA: 0.1 S/CO RATIO (ref 0–0.9)
HDLC SERPL-MCNC: 61 MG/DL
HDLC SERPL-MCNC: 62 MG/DL
HGB BLD-MCNC: 14.2 G/DL (ref 11.1–15.9)
HGB BLD-MCNC: 14.5 G/DL (ref 11.1–15.9)
IMM GRANULOCYTES # BLD: 0 X10E3/UL (ref 0–0.1)
IMM GRANULOCYTES # BLD: 0 X10E3/UL (ref 0–0.1)
IMM GRANULOCYTES NFR BLD: 0 %
IMM GRANULOCYTES NFR BLD: 1 %
LDLC SERPL CALC-MCNC: 117 MG/DL (ref 0–99)
LDLC SERPL CALC-MCNC: 122 MG/DL (ref 0–99)
LYMPHOCYTES # BLD AUTO: 1.4 X10E3/UL (ref 0.7–3.1)
LYMPHOCYTES # BLD AUTO: 1.5 X10E3/UL (ref 0.7–3.1)
LYMPHOCYTES NFR BLD AUTO: 38 %
LYMPHOCYTES NFR BLD AUTO: 39 %
MCH RBC QN AUTO: 29 PG (ref 26.6–33)
MCH RBC QN AUTO: 29.2 PG (ref 26.6–33)
MCHC RBC AUTO-ENTMCNC: 33.7 G/DL (ref 31.5–35.7)
MCHC RBC AUTO-ENTMCNC: 34.3 G/DL (ref 31.5–35.7)
MCV RBC AUTO: 85 FL (ref 79–97)
MCV RBC AUTO: 86 FL (ref 79–97)
MONOCYTES # BLD AUTO: 0.4 X10E3/UL (ref 0.1–0.9)
MONOCYTES # BLD AUTO: 0.4 X10E3/UL (ref 0.1–0.9)
MONOCYTES NFR BLD AUTO: 10 %
MONOCYTES NFR BLD AUTO: 10 %
NEUTROPHILS # BLD AUTO: 1.7 X10E3/UL (ref 1.4–7)
NEUTROPHILS # BLD AUTO: 1.8 X10E3/UL (ref 1.4–7)
NEUTROPHILS NFR BLD AUTO: 47 %
NEUTROPHILS NFR BLD AUTO: 49 %
PLATELET # BLD AUTO: 294 X10E3/UL (ref 150–450)
PLATELET # BLD AUTO: 307 X10E3/UL (ref 150–450)
POTASSIUM SERPL-SCNC: 4.2 MMOL/L (ref 3.5–5.2)
POTASSIUM SERPL-SCNC: 4.3 MMOL/L (ref 3.5–5.2)
PROT SERPL-MCNC: 7.1 G/DL (ref 6–8.5)
PROT SERPL-MCNC: 7.1 G/DL (ref 6–8.5)
RBC # BLD AUTO: 4.89 X10E6/UL (ref 3.77–5.28)
RBC # BLD AUTO: 4.97 X10E6/UL (ref 3.77–5.28)
SL AMB EGFR AFRICAN AMERICAN: 92 ML/MIN/1.73
SL AMB EGFR AFRICAN AMERICAN: 92 ML/MIN/1.73
SL AMB EGFR NON AFRICAN AMERICAN: 80 ML/MIN/1.73
SL AMB EGFR NON AFRICAN AMERICAN: 80 ML/MIN/1.73
SL AMB VLDL CHOLESTEROL CALC: 18 MG/DL (ref 5–40)
SL AMB VLDL CHOLESTEROL CALC: 18 MG/DL (ref 5–40)
SODIUM SERPL-SCNC: 141 MMOL/L (ref 134–144)
SODIUM SERPL-SCNC: 143 MMOL/L (ref 134–144)
TRIGL SERPL-MCNC: 100 MG/DL (ref 0–149)
TRIGL SERPL-MCNC: 103 MG/DL (ref 0–149)
TSH SERPL DL<=0.005 MIU/L-ACNC: 0.69 UIU/ML (ref 0.45–4.5)
TSH SERPL DL<=0.005 MIU/L-ACNC: 0.72 UIU/ML (ref 0.45–4.5)
WBC # BLD AUTO: 3.7 X10E3/UL (ref 3.4–10.8)
WBC # BLD AUTO: 3.7 X10E3/UL (ref 3.4–10.8)

## 2020-09-05 LAB
HIV1 RNA # SERPL NAA+PROBE: <20 COPIES/ML
HIV1 RNA SERPL NAA+PROBE-LOG#: NORMAL LOG10COPY/ML

## 2020-09-09 ENCOUNTER — OFFICE VISIT (OUTPATIENT)
Dept: FAMILY MEDICINE CLINIC | Facility: CLINIC | Age: 55
End: 2020-09-09
Payer: COMMERCIAL

## 2020-09-09 VITALS
WEIGHT: 180 LBS | HEART RATE: 80 BPM | BODY MASS INDEX: 30.73 KG/M2 | OXYGEN SATURATION: 98 % | RESPIRATION RATE: 16 BRPM | DIASTOLIC BLOOD PRESSURE: 70 MMHG | HEIGHT: 64 IN | SYSTOLIC BLOOD PRESSURE: 148 MMHG | TEMPERATURE: 99.3 F

## 2020-09-09 DIAGNOSIS — Z13.29 SCREENING FOR HYPOTHYROIDISM: ICD-10-CM

## 2020-09-09 DIAGNOSIS — Z13.220 SCREENING FOR HYPERLIPIDEMIA: ICD-10-CM

## 2020-09-09 DIAGNOSIS — Z23 NEED FOR INFLUENZA VACCINATION: ICD-10-CM

## 2020-09-09 DIAGNOSIS — Z13.6 SCREENING FOR HYPERTENSION: ICD-10-CM

## 2020-09-09 DIAGNOSIS — Z12.11 COLON CANCER SCREENING: ICD-10-CM

## 2020-09-09 DIAGNOSIS — Z13.820 SCREENING FOR OSTEOPOROSIS: ICD-10-CM

## 2020-09-09 DIAGNOSIS — Z78.0 MENOPAUSE: ICD-10-CM

## 2020-09-09 DIAGNOSIS — Z12.31 ENCOUNTER FOR SCREENING MAMMOGRAM FOR BREAST CANCER: ICD-10-CM

## 2020-09-09 DIAGNOSIS — Z00.00 ROUTINE GENERAL MEDICAL EXAMINATION AT A HEALTH CARE FACILITY: Primary | ICD-10-CM

## 2020-09-09 PROBLEM — G56.00 CARPAL TUNNEL SYNDROME: Status: RESOLVED | Noted: 2019-07-24 | Resolved: 2020-09-09

## 2020-09-09 LAB
SL AMB  POCT GLUCOSE, UA: 0
SL AMB LEUKOCYTE ESTERASE,UA: 75
SL AMB POCT BILIRUBIN,UA: 0
SL AMB POCT BLOOD,UA: 0
SL AMB POCT CLARITY,UA: CLEAR
SL AMB POCT COLOR,UA: YELLOW
SL AMB POCT KETONES,UA: 15
SL AMB POCT NITRITE,UA: 0
SL AMB POCT PH,UA: 5
SL AMB POCT SPECIFIC GRAVITY,UA: 1.02
SL AMB POCT URINE PROTEIN: 0
SL AMB POCT UROBILINOGEN: 0

## 2020-09-09 PROCEDURE — 3725F SCREEN DEPRESSION PERFORMED: CPT | Performed by: FAMILY MEDICINE

## 2020-09-09 PROCEDURE — 90471 IMMUNIZATION ADMIN: CPT

## 2020-09-09 PROCEDURE — 93000 ELECTROCARDIOGRAM COMPLETE: CPT | Performed by: FAMILY MEDICINE

## 2020-09-09 PROCEDURE — 99396 PREV VISIT EST AGE 40-64: CPT | Performed by: FAMILY MEDICINE

## 2020-09-09 PROCEDURE — 81003 URINALYSIS AUTO W/O SCOPE: CPT | Performed by: FAMILY MEDICINE

## 2020-09-09 PROCEDURE — 1036F TOBACCO NON-USER: CPT | Performed by: FAMILY MEDICINE

## 2020-09-09 PROCEDURE — 90682 RIV4 VACC RECOMBINANT DNA IM: CPT

## 2020-09-09 NOTE — PATIENT INSTRUCTIONS
DISCUSSED HEALTH ISSUES  HEALTHY DIET AND EXERCISE  BW WILL BE OBTAINED  MAMMOGRAPHY   RECOMMEND CALCIUM 3235-9590 MG DAILY  VITAMIN D3  1000 IU DAILY  RV IN 1 YEAR FOR ANNUAL EXAM, SOONER IF NEEDED      Recent Results (from the past 672 hour(s))   CBC and differential    Collection Time: 09/03/20 10:18 AM   Result Value Ref Range    White Blood Cell Count 3 7 3 4 - 10 8 x10E3/uL    Red Blood Cell Count 4 89 3 77 - 5 28 x10E6/uL    Hemoglobin 14 2 11 1 - 15 9 g/dL    HCT 42 1 34 0 - 46 6 %    MCV 86 79 - 97 fL    MCH 29 0 26 6 - 33 0 pg    MCHC 33 7 31 5 - 35 7 g/dL    RDW 12 2 11 7 - 15 4 %    Platelet Count 637 543 - 450 x10E3/uL    Neutrophils 47 Not Estab  %    Lymphocytes 39 Not Estab  %    Monocytes 10 Not Estab  %    Eosinophils 2 Not Estab  %    Basophils PCT 1 Not Estab  %    Neutrophils (Absolute) 1 7 1 4 - 7 0 x10E3/uL    Lymphocytes (Absolute) 1 5 0 7 - 3 1 x10E3/uL    Monocytes (Absolute) 0 4 0 1 - 0 9 x10E3/uL    Eosinophils (Absolute) 0 1 0 0 - 0 4 x10E3/uL    Basophils ABS 0 1 0 0 - 0 2 x10E3/uL    Immature Granulocytes 1 Not Estab  %    Immature Granulocytes (Absolute) 0 0 0 0 - 0 1 x10E3/uL   Comprehensive metabolic panel    Collection Time: 09/03/20 10:18 AM   Result Value Ref Range    Glucose, Random 91 65 - 99 mg/dL    BUN 10 6 - 24 mg/dL    Creatinine 0 83 0 57 - 1 00 mg/dL    eGFR Non African American 80 >59 mL/min/1 73    eGFR  92 >59 mL/min/1 73    SL AMB BUN/CREATININE RATIO 12 9 - 23    Sodium 141 134 - 144 mmol/L    Potassium 4 2 3 5 - 5 2 mmol/L    Chloride 103 96 - 106 mmol/L    CO2 24 20 - 29 mmol/L    CALCIUM 9 6 8 7 - 10 2 mg/dL    Protein, Total 7 1 6 0 - 8 5 g/dL    Albumin 4 7 3 8 - 4 9 g/dL    Globulin, Total 2 4 1 5 - 4 5 g/dL    Albumin/Globulin Ratio 2 0 1 2 - 2 2    TOTAL BILIRUBIN 0 3 0 0 - 1 2 mg/dL    Alk Phos Isoenzymes 57 39 - 117 IU/L    AST 22 0 - 40 IU/L    ALT 25 0 - 32 IU/L   Lipid panel    Collection Time: 09/03/20 10:18 AM   Result Value Ref Range Cholesterol, Total 201 (H) 100 - 199 mg/dL    Triglycerides 103 0 - 149 mg/dL    HDL 61 >39 mg/dL    VLDL Cholesterol Calculated 18 5 - 40 mg/dL    LDL Calculated 122 (H) 0 - 99 mg/dL    T   Chol/HDL Ratio 3 3 0 0 - 4 4 ratio   TSH, 3rd generation    Collection Time: 09/03/20 10:18 AM   Result Value Ref Range    TSH 0 720 0 450 - 4 500 uIU/mL   HIV-1 RNA, quantitative, PCR    Collection Time: 09/03/20 10:18 AM   Result Value Ref Range    HIV-1 RNA by PCR, Qn <20 copies/mL    HIV-1 RNA Viral Load Log CANCELED [id number]/mL   Hepatitis C antibody    Collection Time: 09/03/20 10:18 AM   Result Value Ref Range    HEP C AB 0 1 0 0 - 0 9 s/co ratio   CBC and differential    Collection Time: 09/03/20 10:19 AM   Result Value Ref Range    White Blood Cell Count 3 7 3 4 - 10 8 x10E3/uL    Red Blood Cell Count 4 97 3 77 - 5 28 x10E6/uL    Hemoglobin 14 5 11 1 - 15 9 g/dL    HCT 42 3 34 0 - 46 6 %    MCV 85 79 - 97 fL    MCH 29 2 26 6 - 33 0 pg    MCHC 34 3 31 5 - 35 7 g/dL    RDW 12 4 11 7 - 15 4 %    Platelet Count 041 664 - 450 x10E3/uL    Neutrophils 49 Not Estab  %    Lymphocytes 38 Not Estab  %    Monocytes 10 Not Estab  %    Eosinophils 2 Not Estab  %    Basophils PCT 1 Not Estab  %    Neutrophils (Absolute) 1 8 1 4 - 7 0 x10E3/uL    Lymphocytes (Absolute) 1 4 0 7 - 3 1 x10E3/uL    Monocytes (Absolute) 0 4 0 1 - 0 9 x10E3/uL    Eosinophils (Absolute) 0 1 0 0 - 0 4 x10E3/uL    Basophils ABS 0 1 0 0 - 0 2 x10E3/uL    Immature Granulocytes 0 Not Estab  %    Immature Granulocytes (Absolute) 0 0 0 0 - 0 1 x10E3/uL   Comprehensive metabolic panel    Collection Time: 09/03/20 10:19 AM   Result Value Ref Range    Glucose, Random 91 65 - 99 mg/dL    BUN 10 6 - 24 mg/dL    Creatinine 0 83 0 57 - 1 00 mg/dL    eGFR Non African American 80 >59 mL/min/1 73    eGFR  92 >59 mL/min/1 73    SL AMB BUN/CREATININE RATIO 12 9 - 23    Sodium 143 134 - 144 mmol/L    Potassium 4 3 3 5 - 5 2 mmol/L    Chloride 103 96 - 106 mmol/L    CO2 24 20 - 29 mmol/L    CALCIUM 9 4 8 7 - 10 2 mg/dL    Protein, Total 7 1 6 0 - 8 5 g/dL    Albumin 4 7 3 8 - 4 9 g/dL    Globulin, Total 2 4 1 5 - 4 5 g/dL    Albumin/Globulin Ratio 2 0 1 2 - 2 2    TOTAL BILIRUBIN 0 3 0 0 - 1 2 mg/dL    Alk Phos Isoenzymes 55 39 - 117 IU/L    AST 24 0 - 40 IU/L    ALT 25 0 - 32 IU/L   Lipid panel    Collection Time: 09/03/20 10:19 AM   Result Value Ref Range    Cholesterol, Total 197 100 - 199 mg/dL    Triglycerides 100 0 - 149 mg/dL    HDL 62 >39 mg/dL    VLDL Cholesterol Calculated 18 5 - 40 mg/dL    LDL Calculated 117 (H) 0 - 99 mg/dL    T   Chol/HDL Ratio 3 2 0 0 - 4 4 ratio   TSH, 3rd generation    Collection Time: 09/03/20 10:19 AM   Result Value Ref Range    TSH 0 685 0 450 - 4 500 uIU/mL

## 2020-09-09 NOTE — LETTER
Chastity Aquino    Recent Results (from the past 672 hour(s))   CBC and differential    Collection Time: 09/03/20 10:18 AM   Result Value Ref Range    White Blood Cell Count 3 7 3 4 - 10 8 x10E3/uL    Red Blood Cell Count 4 89 3 77 - 5 28 x10E6/uL    Hemoglobin 14 2 11 1 - 15 9 g/dL    HCT 42 1 34 0 - 46 6 %    MCV 86 79 - 97 fL    MCH 29 0 26 6 - 33 0 pg    MCHC 33 7 31 5 - 35 7 g/dL    RDW 12 2 11 7 - 15 4 %    Platelet Count 106 240 - 450 x10E3/uL    Neutrophils 47 Not Estab  %    Lymphocytes 39 Not Estab  %    Monocytes 10 Not Estab  %    Eosinophils 2 Not Estab  %    Basophils PCT 1 Not Estab  %    Neutrophils (Absolute) 1 7 1 4 - 7 0 x10E3/uL    Lymphocytes (Absolute) 1 5 0 7 - 3 1 x10E3/uL    Monocytes (Absolute) 0 4 0 1 - 0 9 x10E3/uL    Eosinophils (Absolute) 0 1 0 0 - 0 4 x10E3/uL    Basophils ABS 0 1 0 0 - 0 2 x10E3/uL    Immature Granulocytes 1 Not Estab  %    Immature Granulocytes (Absolute) 0 0 0 0 - 0 1 x10E3/uL   Comprehensive metabolic panel    Collection Time: 09/03/20 10:18 AM   Result Value Ref Range    Glucose, Random 91 65 - 99 mg/dL    BUN 10 6 - 24 mg/dL    Creatinine 0 83 0 57 - 1 00 mg/dL    eGFR Non African American 80 >59 mL/min/1 73    eGFR  92 >59 mL/min/1 73    SL AMB BUN/CREATININE RATIO 12 9 - 23    Sodium 141 134 - 144 mmol/L    Potassium 4 2 3 5 - 5 2 mmol/L    Chloride 103 96 - 106 mmol/L    CO2 24 20 - 29 mmol/L    CALCIUM 9 6 8 7 - 10 2 mg/dL    Protein, Total 7 1 6 0 - 8 5 g/dL    Albumin 4 7 3 8 - 4 9 g/dL    Globulin, Total 2 4 1 5 - 4 5 g/dL    Albumin/Globulin Ratio 2 0 1 2 - 2 2    TOTAL BILIRUBIN 0 3 0 0 - 1 2 mg/dL    Alk Phos Isoenzymes 57 39 - 117 IU/L    AST 22 0 - 40 IU/L    ALT 25 0 - 32 IU/L   Lipid panel    Collection Time: 09/03/20 10:18 AM   Result Value Ref Range    Cholesterol, Total 201 (H) 100 - 199 mg/dL    Triglycerides 103 0 - 149 mg/dL    HDL 61 >39 mg/dL    VLDL Cholesterol Calculated 18 5 - 40 mg/dL    LDL Calculated 122 (H) 0 - 99 mg/dL    T   Chol/HDL Ratio 3 3 0 0 - 4 4 ratio   TSH, 3rd generation    Collection Time: 09/03/20 10:18 AM   Result Value Ref Range    TSH 0 720 0 450 - 4 500 uIU/mL   HIV-1 RNA, quantitative, PCR    Collection Time: 09/03/20 10:18 AM   Result Value Ref Range    HIV-1 RNA by PCR, Qn <20 copies/mL    HIV-1 RNA Viral Load Log CANCELED [id number]   Hepatitis C antibody    Collection Time: 09/03/20 10:18 AM   Result Value Ref Range    HEP C AB 0 1 0 0 - 0 9 s/co ratio   CBC and differential    Collection Time: 09/03/20 10:19 AM   Result Value Ref Range    White Blood Cell Count 3 7 3 4 - 10 8 x10E3/uL    Red Blood Cell Count 4 97 3 77 - 5 28 x10E6/uL    Hemoglobin 14 5 11 1 - 15 9 g/dL    HCT 42 3 34 0 - 46 6 %    MCV 85 79 - 97 fL    MCH 29 2 26 6 - 33 0 pg    MCHC 34 3 31 5 - 35 7 g/dL    RDW 12 4 11 7 - 15 4 %    Platelet Count 231 677 - 450 x10E3/uL    Neutrophils 49 Not Estab  %    Lymphocytes 38 Not Estab  %    Monocytes 10 Not Estab  %    Eosinophils 2 Not Estab  %    Basophils PCT 1 Not Estab  %    Neutrophils (Absolute) 1 8 1 4 - 7 0 x10E3/uL    Lymphocytes (Absolute) 1 4 0 7 - 3 1 x10E3/uL    Monocytes (Absolute) 0 4 0 1 - 0 9 x10E3/uL    Eosinophils (Absolute) 0 1 0 0 - 0 4 x10E3/uL    Basophils ABS 0 1 0 0 - 0 2 x10E3/uL    Immature Granulocytes 0 Not Estab  %    Immature Granulocytes (Absolute) 0 0 0 0 - 0 1 x10E3/uL   Comprehensive metabolic panel    Collection Time: 09/03/20 10:19 AM   Result Value Ref Range    Glucose, Random 91 65 - 99 mg/dL    BUN 10 6 - 24 mg/dL    Creatinine 0 83 0 57 - 1 00 mg/dL    eGFR Non African American 80 >59 mL/min/1 73    eGFR  92 >59 mL/min/1 73    SL AMB BUN/CREATININE RATIO 12 9 - 23    Sodium 143 134 - 144 mmol/L    Potassium 4 3 3 5 - 5 2 mmol/L    Chloride 103 96 - 106 mmol/L    CO2 24 20 - 29 mmol/L    CALCIUM 9 4 8 7 - 10 2 mg/dL    Protein, Total 7 1 6 0 - 8 5 g/dL    Albumin 4 7 3 8 - 4 9 g/dL    Globulin, Total 2 4 1 5 - 4 5 g/dL Albumin/Globulin Ratio 2 0 1 2 - 2 2    TOTAL BILIRUBIN 0 3 0 0 - 1 2 mg/dL    Alk Phos Isoenzymes 55 39 - 117 IU/L    AST 24 0 - 40 IU/L    ALT 25 0 - 32 IU/L   Lipid panel    Collection Time: 09/03/20 10:19 AM   Result Value Ref Range    Cholesterol, Total 197 100 - 199 mg/dL    Triglycerides 100 0 - 149 mg/dL    HDL 62 >39 mg/dL    VLDL Cholesterol Calculated 18 5 - 40 mg/dL    LDL Calculated 117 (H) 0 - 99 mg/dL    T   Chol/HDL Ratio 3 2 0 0 - 4 4 ratio   TSH, 3rd generation    Collection Time: 09/03/20 10:19 AM   Result Value Ref Range    TSH 0 685 0 450 - 4 500 uIU/mL

## 2020-09-09 NOTE — PROGRESS NOTES
BMI Counseling: Body mass index is 30 9 kg/m²  The BMI is above normal  Nutrition recommendations include encouraging healthy choices of fruits and vegetables and moderation in carbohydrate intake  Exercise recommendations include exercising 3-5 times per week  No pharmacotherapy was ordered  FAMILY PRACTICE HEALTH MAINTENANCE OFFICE VISIT  [facility] - [facility]    NAME: Mary Jo Archibald  AGE: 47 y o  SEX: female  : 1965     DATE: 2020    Assessment and Plan     Problem List Items Addressed This Visit        Other    Colon cancer screening    Encounter for screening mammogram for breast cancer    Relevant Orders    Mammo screening bilateral w cad    Screening for hypothyroidism    Screening for hyperlipidemia    Screening for hypertension    Relevant Orders    POCT urine dip auto non-scope (Completed)    Routine general medical examination at a health care facility - Primary    Relevant Orders    POCT urine dip auto non-scope (Completed)    POCT ECG (Completed)    Menopause    Relevant Orders    DXA bone density spine hip and pelvis    Screening for osteoporosis    Relevant Orders    DXA bone density spine hip and pelvis    Need for influenza vaccination    Relevant Orders    influenza vaccine, quadrivalent, recombinant, PF, 0 5 mL, for patients 18 yr+ (FLUBLOK) (Completed)               Return in about 1 year (around 2021) for Annual physical         Chief Complaint     Chief Complaint   Patient presents with    Physical Exam       History of Present Illness     DISCUSSED HEALTH ISSUES  REVIEWED MEDICAL RECORD  NO CONCERNS AT THIS TIME        Well Adult Physical   Patient here for a comprehensive physical exam       Diet and Physical Activity  Diet: well balanced diet  Weight concerns: Patient has class 1 obesity (BMI 30-34  9)  Exercise: intermittently      Depression Screen  PHQ-9 Depression Screening    PHQ-9:    Frequency of the following problems over the past two weeks:       Little interest or pleasure in doing things:  1 - several days  Feeling down, depressed, or hopeless:  1 - several days  PHQ-2 Score:  2          General Health  Hearing: Normal:  bilateral  Vision: no vision problems  Dental: regular dental visits    Reproductive Health          The following portions of the patient's history were reviewed and updated as appropriate: allergies, current medications, past family history, past medical history, past social history, past surgical history and problem list     Review of Systems     Review of Systems   Constitutional: Negative for chills, fatigue and fever  HENT: Negative for congestion, ear discharge, ear pain, mouth sores, postnasal drip, sore throat and trouble swallowing  Eyes: Negative for pain, discharge and visual disturbance  Respiratory: Negative for cough, shortness of breath and wheezing  Cardiovascular: Negative for chest pain, palpitations and leg swelling  Gastrointestinal: Negative for abdominal distention, abdominal pain, blood in stool, diarrhea and nausea  Endocrine: Negative for polydipsia, polyphagia and polyuria  Genitourinary: Negative for dysuria, frequency, hematuria and urgency  Musculoskeletal: Negative for arthralgias, gait problem and joint swelling  Skin: Negative for pallor and rash  Neurological: Negative for dizziness, syncope, speech difficulty, weakness, light-headedness, numbness and headaches  Hematological: Negative for adenopathy  Psychiatric/Behavioral: Negative for behavioral problems, confusion and sleep disturbance  The patient is not nervous/anxious          Past Medical History     Past Medical History:   Diagnosis Date    Celiac disease     last assessed: 9/27/2016    Chicken pox     AS PER PT       Past Surgical History     Past Surgical History:   Procedure Laterality Date    ROTATOR CUFF REPAIR  2017       Social History     Social History     Socioeconomic History  Marital status: /Civil Union     Spouse name: None    Number of children: None    Years of education: None    Highest education level: None   Occupational History    None   Social Needs    Financial resource strain: None    Food insecurity     Worry: None     Inability: None    Transportation needs     Medical: None     Non-medical: None   Tobacco Use    Smoking status: Former Smoker    Smokeless tobacco: Never Used   Substance and Sexual Activity    Alcohol use: Yes     Comment: social    Drug use: Not Currently    Sexual activity: None   Lifestyle    Physical activity     Days per week: None     Minutes per session: None    Stress: None   Relationships    Social connections     Talks on phone: None     Gets together: None     Attends Quaker service: None     Active member of club or organization: None     Attends meetings of clubs or organizations: None     Relationship status: None    Intimate partner violence     Fear of current or ex partner: None     Emotionally abused: None     Physically abused: None     Forced sexual activity: None   Other Topics Concern    None   Social History Narrative    None       Family History     Family History   Problem Relation Age of Onset    No Known Problems Mother     Heart disease Family     Diabetes Family     Cancer Family     Cancer Father         bladder    Dementia Father     Depression Father     Substance Abuse Neg Hx     Mental illness Neg Hx        Current Medications       Current Outpatient Medications:     cyanocobalamin (VITAMIN B-12) 100 mcg tablet, Take by mouth, Disp: , Rfl:     ALPRAZolam (XANAX) 0 25 mg tablet, Take 1 tablet (0 25 mg total) by mouth 3 (three) times a day as needed for anxiety (as needed for flying) (Patient not taking: Reported on 9/9/2020), Disp: 30 tablet, Rfl: 0     Allergies     Allergies   Allergen Reactions    Barley Grass Vomiting, Dermatitis, Dizziness, GI Intolerance, Headache, Itching and Lightheadedness    Oat Dermatitis, GI Intolerance, Headache and Irritability       Objective     /70   Pulse 80   Temp 99 3 °F (37 4 °C) (Temporal)   Resp 16   Ht 5' 4" (1 626 m)   Wt 81 6 kg (180 lb)   SpO2 98%   BMI 30 90 kg/m²      Physical Exam      No exam data present    Health Maintenance     Health Maintenance   Topic Date Due    MAMMOGRAM  12/07/2018    Influenza Vaccine  07/01/2020    BMI: Followup Plan  07/24/2020    Annual Physical  07/24/2020    Depression Screening PHQ  09/09/2021    BMI: Adult  09/09/2021    Colorectal Cancer Screening  07/31/2022    Cervical Cancer Screening  07/24/2024    DTaP,Tdap,and Td Vaccines (2 - Td) 07/24/2029    HIV Screening  Completed    Hepatitis C Screening  Completed    Pneumococcal Vaccine: Pediatrics (0 to 5 Years) and At-Risk Patients (6 to 59 Years)  Aged Out    HIB Vaccine  Aged Out    Hepatitis B Vaccine  Aged Out    IPV Vaccine  Aged Out    Hepatitis A Vaccine  Aged Out    Meningococcal ACWY Vaccine  Aged Out    HPV Vaccine  Aged [employer] History   Administered Date(s) Administered    DT (pediatric) 07/19/2006    H1N1, All Formulations 10/23/2009    Influenza, recombinant, quadrivalent,injectable, preservative free 09/09/2020    Tdap 07/24/2019       Andriy Goncalves MD  [facility]

## 2020-12-14 ENCOUNTER — TRANSCRIBE ORDERS (OUTPATIENT)
Dept: ADMINISTRATIVE | Facility: HOSPITAL | Age: 55
End: 2020-12-14

## 2020-12-14 ENCOUNTER — HOSPITAL ENCOUNTER (OUTPATIENT)
Dept: RADIOLOGY | Facility: HOSPITAL | Age: 55
Discharge: HOME | End: 2020-12-14
Payer: COMMERCIAL

## 2020-12-14 VITALS — HEIGHT: 64 IN | WEIGHT: 168 LBS | BODY MASS INDEX: 28.68 KG/M2

## 2020-12-14 DIAGNOSIS — Z78.0 MENOPAUSE: ICD-10-CM

## 2020-12-14 DIAGNOSIS — Z12.31 ENCOUNTER FOR SCREENING MAMMOGRAM FOR BREAST CANCER: ICD-10-CM

## 2020-12-14 DIAGNOSIS — Z13.820 SCREENING FOR OSTEOPOROSIS: ICD-10-CM

## 2020-12-14 PROCEDURE — 77080 DXA BONE DENSITY AXIAL: CPT

## 2020-12-14 PROCEDURE — 77067 SCR MAMMO BI INCL CAD: CPT

## 2020-12-14 PROCEDURE — 77063 BREAST TOMOSYNTHESIS BI: CPT

## 2021-01-18 DIAGNOSIS — Z23 ENCOUNTER FOR IMMUNIZATION: ICD-10-CM

## 2021-01-21 ENCOUNTER — IMMUNIZATIONS (OUTPATIENT)
Dept: FAMILY MEDICINE CLINIC | Facility: HOSPITAL | Age: 56
End: 2021-01-21

## 2021-01-21 DIAGNOSIS — Z23 ENCOUNTER FOR IMMUNIZATION: Primary | ICD-10-CM

## 2021-01-21 PROCEDURE — 0001A SARS-COV-2 / COVID-19 MRNA VACCINE (PFIZER-BIONTECH) 30 MCG: CPT

## 2021-01-21 PROCEDURE — 91300 SARS-COV-2 / COVID-19 MRNA VACCINE (PFIZER-BIONTECH) 30 MCG: CPT

## 2021-02-10 ENCOUNTER — IMMUNIZATIONS (OUTPATIENT)
Dept: FAMILY MEDICINE CLINIC | Facility: HOSPITAL | Age: 56
End: 2021-02-10

## 2021-02-10 DIAGNOSIS — Z23 ENCOUNTER FOR IMMUNIZATION: Primary | ICD-10-CM

## 2021-02-10 PROCEDURE — 91300 SARS-COV-2 / COVID-19 MRNA VACCINE (PFIZER-BIONTECH) 30 MCG: CPT

## 2021-02-10 PROCEDURE — 0002A SARS-COV-2 / COVID-19 MRNA VACCINE (PFIZER-BIONTECH) 30 MCG: CPT

## 2021-09-02 ENCOUNTER — RA CDI HCC (OUTPATIENT)
Dept: OTHER | Facility: HOSPITAL | Age: 56
End: 2021-09-02

## 2021-09-02 NOTE — PROGRESS NOTES
[facility]  coding opportunities       Chart reviewed, no opportunity found: CHART REVIEWED, NO OPPORTUNITY FOUND                        Patients insurance company: [employer] (Medicare and Commercial for [employer] and [employer])

## 2021-09-13 RX ORDER — FLUTICASONE PROPIONATE 50 MCG
SPRAY, SUSPENSION (ML) NASAL DAILY
COMMUNITY
End: 2021-09-14 | Stop reason: HOSPADM

## 2021-09-14 ENCOUNTER — TELEPHONE (OUTPATIENT)
Dept: FAMILY MEDICINE CLINIC | Facility: CLINIC | Age: 56
End: 2021-09-14

## 2021-09-14 ENCOUNTER — OFFICE VISIT (OUTPATIENT)
Dept: FAMILY MEDICINE CLINIC | Facility: CLINIC | Age: 56
End: 2021-09-14
Payer: COMMERCIAL

## 2021-09-14 VITALS
SYSTOLIC BLOOD PRESSURE: 128 MMHG | HEART RATE: 80 BPM | DIASTOLIC BLOOD PRESSURE: 80 MMHG | BODY MASS INDEX: 28.68 KG/M2 | OXYGEN SATURATION: 98 % | TEMPERATURE: 98.6 F | HEIGHT: 64 IN | RESPIRATION RATE: 12 BRPM | WEIGHT: 168 LBS

## 2021-09-14 DIAGNOSIS — Z13.29 SCREENING FOR HYPOTHYROIDISM: ICD-10-CM

## 2021-09-14 DIAGNOSIS — Z12.11 COLON CANCER SCREENING: ICD-10-CM

## 2021-09-14 DIAGNOSIS — K90.0 ACD (ADULT CELIAC DISEASE): ICD-10-CM

## 2021-09-14 DIAGNOSIS — Z13.220 SCREENING FOR HYPERLIPIDEMIA: ICD-10-CM

## 2021-09-14 DIAGNOSIS — E53.8 B12 DEFICIENCY: ICD-10-CM

## 2021-09-14 DIAGNOSIS — Z12.31 ENCOUNTER FOR SCREENING MAMMOGRAM FOR BREAST CANCER: ICD-10-CM

## 2021-09-14 DIAGNOSIS — Z23 NEED FOR INFLUENZA VACCINATION: ICD-10-CM

## 2021-09-14 DIAGNOSIS — Z13.6 SCREENING FOR HYPERTENSION: ICD-10-CM

## 2021-09-14 DIAGNOSIS — Z00.00 ROUTINE GENERAL MEDICAL EXAMINATION AT A HEALTH CARE FACILITY: Primary | ICD-10-CM

## 2021-09-14 PROBLEM — S46.009A INJURY OF TENDON OF ROTATOR CUFF: Status: RESOLVED | Noted: 2019-07-24 | Resolved: 2021-09-14

## 2021-09-14 LAB
SL AMB  POCT GLUCOSE, UA: NORMAL
SL AMB LEUKOCYTE ESTERASE,UA: NORMAL
SL AMB POCT BILIRUBIN,UA: NORMAL
SL AMB POCT BLOOD,UA: NORMAL
SL AMB POCT CLARITY,UA: CLEAR
SL AMB POCT COLOR,UA: YELLOW
SL AMB POCT KETONES,UA: NORMAL
SL AMB POCT NITRITE,UA: NORMAL
SL AMB POCT PH,UA: 7
SL AMB POCT SPECIFIC GRAVITY,UA: 1.01
SL AMB POCT URINE PROTEIN: NORMAL
SL AMB POCT UROBILINOGEN: NORMAL

## 2021-09-14 PROCEDURE — 99396 PREV VISIT EST AGE 40-64: CPT | Performed by: FAMILY MEDICINE

## 2021-09-14 PROCEDURE — 90471 IMMUNIZATION ADMIN: CPT

## 2021-09-14 PROCEDURE — 3725F SCREEN DEPRESSION PERFORMED: CPT | Performed by: FAMILY MEDICINE

## 2021-09-14 PROCEDURE — 81003 URINALYSIS AUTO W/O SCOPE: CPT | Performed by: FAMILY MEDICINE

## 2021-09-14 PROCEDURE — 93000 ELECTROCARDIOGRAM COMPLETE: CPT | Performed by: FAMILY MEDICINE

## 2021-09-14 PROCEDURE — 3008F BODY MASS INDEX DOCD: CPT | Performed by: FAMILY MEDICINE

## 2021-09-14 PROCEDURE — 96372 THER/PROPH/DIAG INJ SC/IM: CPT

## 2021-09-14 PROCEDURE — 90682 RIV4 VACC RECOMBINANT DNA IM: CPT

## 2021-09-14 RX ORDER — DIPHENOXYLATE HYDROCHLORIDE AND ATROPINE SULFATE 2.5; .025 MG/1; MG/1
1 TABLET ORAL DAILY
COMMUNITY

## 2021-09-14 RX ORDER — CYANOCOBALAMIN 1000 UG/ML
1000 INJECTION INTRAMUSCULAR; SUBCUTANEOUS
Status: DISCONTINUED | OUTPATIENT
Start: 2021-09-14 | End: 2021-09-14

## 2021-09-14 RX ORDER — VITAMIN B COMPLEX
1 CAPSULE ORAL DAILY
COMMUNITY

## 2021-09-14 RX ADMIN — CYANOCOBALAMIN 1000 MCG: 1000 INJECTION INTRAMUSCULAR; SUBCUTANEOUS at 13:26

## 2021-09-14 NOTE — PROGRESS NOTES
BMI Counseling: There is no height or weight on file to calculate BMI  The BMI is above normal  Nutrition recommendations include encouraging healthy choices of fruits and vegetables and moderation in carbohydrate intake  Exercise recommendations include exercising 3-5 times per week  No pharmacotherapy was ordered  Rationale for BMI follow-up plan is due to patient being overweight or obese  FAMILY PRACTICE HEALTH MAINTENANCE OFFICE VISIT  [facility] - [facility]    NAME: Rahdika Simon  AGE: 54 y o  SEX: female  : 1965     DATE: 2021    Assessment and Plan     Problem List Items Addressed This Visit        Digestive    ACD (adult celiac disease)       Other    Colon cancer screening    Encounter for screening mammogram for breast cancer    Relevant Orders    Mammo screening bilateral w 3d & cad    Screening for hypothyroidism    Screening for hyperlipidemia    Screening for hypertension    Relevant Orders    POCT ECG    Routine general medical examination at a health care facility - Primary    Need for influenza vaccination    Relevant Orders    influenza vaccine, quadrivalent, recombinant, PF, 0 5 mL, for patients 18 yr+ (FLUBLOK) (Completed)               Return in about 1 year (around 2022) for Annual physical         Chief Complaint     Chief Complaint   Patient presents with    Annual Exam       History of Present Illness     [facility] [address] RECORD  NO CONCERNS AT THIS TIME        Well Adult Physical   Patient here for a comprehensive physical exam       Diet and Physical Activity  Diet: well balanced diet  Weight concerns: Patient is overweight (BMI 25 0-29  9)  Exercise: intermittently      Depression Screen  PHQ-9 Depression Screening    PHQ-9:   Frequency of the following problems over the past two weeks:      Little interest or pleasure in doing things: 0 - not at all  Feeling down, depressed, or hopeless: 0 - not at all  PHQ-2 Score: 0          General Health  Hearing: Normal:  bilateral  Vision: no vision problems  Dental: regular dental visits    Reproductive Health          The following portions of the patient's history were reviewed and updated as appropriate: allergies, current medications, past family history, past medical history, past social history, past surgical history and problem list     Review of Systems     Review of Systems   Constitutional: Negative for chills, fatigue and fever  HENT: Negative for congestion, ear discharge, ear pain, mouth sores, postnasal drip, sore throat and trouble swallowing  Eyes: Negative for pain, discharge and visual disturbance  Respiratory: Negative for cough, shortness of breath and wheezing  Cardiovascular: Negative for chest pain, palpitations and leg swelling  Gastrointestinal: Negative for abdominal distention, abdominal pain, blood in stool, diarrhea and nausea  Endocrine: Negative for polydipsia, polyphagia and polyuria  Genitourinary: Negative for dysuria, frequency, hematuria and urgency  Musculoskeletal: Negative for arthralgias, gait problem and joint swelling  Skin: Negative for pallor and rash  Neurological: Negative for dizziness, syncope, speech difficulty, weakness, light-headedness, numbness and headaches  Hematological: Negative for adenopathy  Psychiatric/Behavioral: Negative for behavioral problems, confusion and sleep disturbance  The patient is not nervous/anxious          Past Medical History     Past Medical History:   Diagnosis Date    Celiac disease     last assessed: 9/27/2016    Chicken pox     AS PER PT       Past Surgical History     Past Surgical History:   Procedure Laterality Date    ROTATOR CUFF REPAIR  2017       Social History     Social History     Socioeconomic History    Marital status: /Civil Union     Spouse name: None    Number of children: None    Years of education: None    Highest education level: None   Occupational History    None   Tobacco Use    Smoking status: Former Smoker    Smokeless tobacco: Never Used   Vaping Use    Vaping Use: Never used   Substance and Sexual Activity    Alcohol use: Yes     Comment: social    Drug use: Never    Sexual activity: None   Other Topics Concern    None   Social History Narrative    None     Social Determinants of Health     Financial Resource Strain:     Difficulty of Paying Living Expenses:    Food Insecurity:     Worried About Running Out of Food in the Last Year:     [address] in the Last Year:    Transportation Needs:     Lack of Transportation (Medical):      Lack of Transportation (Non-Medical):    Physical Activity:     Days of Exercise per Week:     Minutes of Exercise per Session:    Stress:     Feeling of Stress :    Social Connections:     Frequency of Communication with Friends and Family:     Frequency of Social Gatherings with Friends and Family:     Attends Sabianist Services:     Active Member of Clubs or Organizations:     Attends Club or Organization Meetings:     Marital Status:    Intimate Partner Violence:     Fear of Current or Ex-Partner:     Emotionally Abused:     Physically Abused:     Sexually Abused:        Family History     Family History   Problem Relation Age of Onset    No Known Problems Mother     Heart disease Family     Diabetes Family     Cancer Family     Cancer Father         bladder    Dementia Father     Depression Father     Substance Abuse Neg Hx     Mental illness Neg Hx        Current Medications       Current Outpatient Medications:     b complex vitamins capsule, Take 1 capsule by mouth daily, Disp: , Rfl:     Cetirizine HCl (ZYRTEC PO), Take by mouth daily, Disp: , Rfl:     multivitamin (THERAGRAN) TABS, Take 1 tablet by mouth daily, Disp: , Rfl:      Allergies     Allergies   Allergen Reactions    Barley Grass - Food Allergy Vomiting, Dermatitis, Dizziness, GI Intolerance, Headache, Itching and Lightheadedness    Oat - Food Allergy Dermatitis, GI Intolerance, Headache and Irritability       Objective     /80 (BP Location: Left arm, Patient Position: Sitting, Cuff Size: Adult)   Pulse 80   Temp 98 6 °F (37 °C) (Temporal)   Resp 12   Ht 5' 4" (1 626 m)   Wt 76 2 kg (168 lb)   LMP 08/13/2021 (Approximate)   SpO2 98%   BMI 28 84 kg/m²      Physical Exam  Vitals reviewed  Constitutional:       Appearance: Normal appearance  She is well-developed and normal weight  HENT:      Head: Normocephalic and atraumatic  Right Ear: Tympanic membrane and external ear normal       Left Ear: Tympanic membrane and external ear normal       Nose: Nose normal    Eyes:      General:         Right eye: No discharge  Left eye: No discharge  Conjunctiva/sclera: Conjunctivae normal       Pupils: Pupils are equal, round, and reactive to light  Neck:      Thyroid: No thyromegaly  Cardiovascular:      Rate and Rhythm: Normal rate and regular rhythm  Heart sounds: Normal heart sounds  No murmur heard  Pulmonary:      Effort: Pulmonary effort is normal       Breath sounds: Normal breath sounds  No wheezing or rales  Abdominal:      General: Bowel sounds are normal  There is no distension  Palpations: Abdomen is soft  There is no mass  Tenderness: There is no abdominal tenderness  There is no guarding or rebound  Musculoskeletal:         General: No tenderness or deformity  Normal range of motion  Cervical back: Normal range of motion and neck supple  Lymphadenopathy:      Cervical: No cervical adenopathy  Skin:     General: Skin is warm and dry  Findings: No erythema or rash  Neurological:      Mental Status: She is alert and oriented to person, place, and time  Cranial Nerves: No cranial nerve deficit  Motor: No abnormal muscle tone        Coordination: Coordination normal       Deep Tendon Reflexes: Reflexes are normal and symmetric  Psychiatric:         Behavior: Behavior normal          Thought Content:  Thought content normal          Judgment: Judgment normal            No exam data present    Health Maintenance     Health Maintenance   Topic Date Due    Breast Cancer Screening: Mammogram  12/14/2021    Colorectal Cancer Screening  07/31/2022    Depression Screening  09/14/2022    BMI: Followup Plan  09/14/2022    BMI: Adult  09/14/2022    Annual Physical  09/14/2022    Cervical Cancer Screening  07/24/2024    DTaP,Tdap,and Td Vaccines (2 - Td or Tdap) 07/24/2029    HIV Screening  Completed    Hepatitis C Screening  Completed    Influenza Vaccine  Completed    COVID-19 Vaccine  Completed    Pneumococcal Vaccine: Pediatrics (0 to 5 Years) and At-Risk Patients (6 to 59 Years)  Aged Out    HIB Vaccine  Aged Out    Hepatitis B Vaccine  Aged Out    IPV Vaccine  Aged Out    Hepatitis A Vaccine  Aged Out    Meningococcal ACWY Vaccine  Aged Out    HPV Vaccine  Aged [employer] History   Administered Date(s) Administered    DT (pediatric) 07/19/2006    H1N1, All Formulations 10/23/2009    Influenza, recombinant, quadrivalent,injectable, preservative free 09/09/2020, 09/14/2021    SARS-CoV-2 / COVID-19 mRNA IM (Pfizer-BioNTech) 01/21/2021, 02/10/2021    Tdap 07/24/2019       Josué Stahl MD  [facility]

## 2021-09-14 NOTE — TELEPHONE ENCOUNTER
Pt wanted to know if you are sending an rx to the pharmacy for her to give herself month B12 inj  Please advise

## 2021-09-14 NOTE — PATIENT INSTRUCTIONS
DISCUSSED HEALTH ISSUES  HEALTHY DIET AND EXERCISE  BW WILL BE REVIEWED  MAMMOGRAPHY   RECOMMEND CALCIUM 2162-8053 MG DAILY  VITAMIN D3  1000 IU DAILY  RV IN 1 YEAR FOR ANNUAL EXAM, SOONER IF NEEDED      Recent Results (from the past 672 hour(s))   CBC and differential    Collection Time: 09/07/21  8:18 AM   Result Value Ref Range    White Blood Cell Count 4 4 3 4 - 10 8 x10E3/uL    Red Blood Cell Count 4 92 3 77 - 5 28 x10E6/uL    Hemoglobin 14 8 11 1 - 15 9 g/dL    HCT 41 0 34 0 - 46 6 %    MCV 83 79 - 97 fL    MCH 30 1 26 6 - 33 0 pg    MCHC 36 1 (H) 31 5 - 35 7 g/dL    RDW 12 0 11 7 - 15 4 %    Platelet Count 432 172 - 450 x10E3/uL    Neutrophils 44 Not Estab  %    Lymphocytes 43 Not Estab  %    Monocytes 10 Not Estab  %    Eosinophils 2 Not Estab  %    Basophils PCT 1 Not Estab  %    Neutrophils (Absolute) 1 9 1 4 - 7 0 x10E3/uL    Lymphocytes (Absolute) 2 0 0 7 - 3 1 x10E3/uL    Monocytes (Absolute) 0 4 0 1 - 0 9 x10E3/uL    Eosinophils (Absolute) 0 1 0 0 - 0 4 x10E3/uL    Basophils ABS 0 1 0 0 - 0 2 x10E3/uL    Immature Granulocytes 0 Not Estab  %    Immature Granulocytes (Absolute) 0 0 0 0 - 0 1 x10E3/uL   Comprehensive metabolic panel    Collection Time: 09/07/21  8:18 AM   Result Value Ref Range    Glucose, Random 91 65 - 99 mg/dL    BUN 11 6 - 24 mg/dL    Creatinine 0 83 0 57 - 1 00 mg/dL    eGFR Non African American 80 >59 mL/min/1 73    eGFR  92 >59 mL/min/1 73    SL AMB BUN/CREATININE RATIO 13 9 - 23    Sodium 143 134 - 144 mmol/L    Potassium 4 2 3 5 - 5 2 mmol/L    Chloride 103 96 - 106 mmol/L    CO2 27 20 - 29 mmol/L    CALCIUM 9 5 8 7 - 10 2 mg/dL    Protein, Total 6 7 6 0 - 8 5 g/dL    Albumin 4 6 3 8 - 4 9 g/dL    Globulin, Total 2 1 1 5 - 4 5 g/dL    Albumin/Globulin Ratio 2 2 1 2 - 2 2    TOTAL BILIRUBIN 0 4 0 0 - 1 2 mg/dL    Alk Phos Isoenzymes 64 48 - 121 IU/L    AST 20 0 - 40 IU/L    ALT 27 0 - 32 IU/L   Lipid panel    Collection Time: 09/07/21  8:18 AM   Result Value Ref Range    Cholesterol, Total 217 (H) 100 - 199 mg/dL    Triglycerides 87 0 - 149 mg/dL    HDL 80 >39 mg/dL    VLDL Cholesterol Calculated 15 5 - 40 mg/dL    LDL Calculated 122 (H) 0 - 99 mg/dL    T   Chol/HDL Ratio 2 7 0 0 - 4 4 ratio   TSH, 3rd generation    Collection Time: 09/07/21  8:18 AM   Result Value Ref Range    TSH 2 480 0 450 - 4 500 uIU/mL

## 2021-09-14 NOTE — LETTER
jaquan jeffers      Current Outpatient Medications:     cyanocobalamin (VITAMIN B-12) 100 mcg tablet, Take by mouth, Disp: , Rfl:     Diclofenac Sodium (Voltaren) 1 %, Voltaren 1 % topical gel  Apply 2 gram to affected area(s) by topical route 4 times per day, Disp: , Rfl:     fluticasone (Flonase) 50 mcg/act nasal spray, Daily, Disp: , Rfl:       Recent Results (from the past 672 hour(s))   CBC and differential    Collection Time: 09/07/21  8:18 AM   Result Value Ref Range    White Blood Cell Count 4 4 3 4 - 10 8 x10E3/uL    Red Blood Cell Count 4 92 3 77 - 5 28 x10E6/uL    Hemoglobin 14 8 11 1 - 15 9 g/dL    HCT 41 0 34 0 - 46 6 %    MCV 83 79 - 97 fL    MCH 30 1 26 6 - 33 0 pg    MCHC 36 1 (H) 31 5 - 35 7 g/dL    RDW 12 0 11 7 - 15 4 %    Platelet Count 559 277 - 450 x10E3/uL    Neutrophils 44 Not Estab  %    Lymphocytes 43 Not Estab  %    Monocytes 10 Not Estab  %    Eosinophils 2 Not Estab  %    Basophils PCT 1 Not Estab  %    Neutrophils (Absolute) 1 9 1 4 - 7 0 x10E3/uL    Lymphocytes (Absolute) 2 0 0 7 - 3 1 x10E3/uL    Monocytes (Absolute) 0 4 0 1 - 0 9 x10E3/uL    Eosinophils (Absolute) 0 1 0 0 - 0 4 x10E3/uL    Basophils ABS 0 1 0 0 - 0 2 x10E3/uL    Immature Granulocytes 0 Not Estab  %    Immature Granulocytes (Absolute) 0 0 0 0 - 0 1 x10E3/uL   Comprehensive metabolic panel    Collection Time: 09/07/21  8:18 AM   Result Value Ref Range    Glucose, Random 91 65 - 99 mg/dL    BUN 11 6 - 24 mg/dL    Creatinine 0 83 0 57 - 1 00 mg/dL    eGFR Non African American 80 >59 mL/min/1 73    eGFR  92 >59 mL/min/1 73    SL AMB BUN/CREATININE RATIO 13 9 - 23    Sodium 143 134 - 144 mmol/L    Potassium 4 2 3 5 - 5 2 mmol/L    Chloride 103 96 - 106 mmol/L    CO2 27 20 - 29 mmol/L    CALCIUM 9 5 8 7 - 10 2 mg/dL    Protein, Total 6 7 6 0 - 8 5 g/dL    Albumin 4 6 3 8 - 4 9 g/dL    Globulin, Total 2 1 1 5 - 4 5 g/dL    Albumin/Globulin Ratio 2 2 1 2 - 2 2    TOTAL BILIRUBIN 0 4 0 0 - 1 2 mg/dL Alk Phos Isoenzymes 64 48 - 121 IU/L    AST 20 0 - 40 IU/L    ALT 27 0 - 32 IU/L   Lipid panel    Collection Time: 09/07/21  8:18 AM   Result Value Ref Range    Cholesterol, Total 217 (H) 100 - 199 mg/dL    Triglycerides 87 0 - 149 mg/dL    HDL 80 >39 mg/dL    VLDL Cholesterol Calculated 15 5 - 40 mg/dL    LDL Calculated 122 (H) 0 - 99 mg/dL    T   Chol/HDL Ratio 2 7 0 0 - 4 4 ratio   TSH, 3rd generation    Collection Time: 09/07/21  8:18 AM   Result Value Ref Range    TSH 2 480 0 450 - 4 500 uIU/mL

## 2021-09-16 DIAGNOSIS — F41.9 ANXIETY: Primary | ICD-10-CM

## 2021-09-16 NOTE — TELEPHONE ENCOUNTER
[facility] states they received rx for B12 injections, but they need needles and syringe rxs     Please send into [facility]

## 2021-10-04 ENCOUNTER — TELEPHONE (OUTPATIENT)
Dept: FAMILY MEDICINE CLINIC | Facility: CLINIC | Age: 56
End: 2021-10-04

## 2021-10-04 RX ORDER — ALPRAZOLAM 0.25 MG/1
0.25 TABLET ORAL 3 TIMES DAILY PRN
Qty: 30 TABLET | Refills: 0 | Status: SHIPPED | OUTPATIENT
Start: 2021-10-04

## 2021-10-04 RX ORDER — ALPRAZOLAM 0.25 MG/1
TABLET ORAL
COMMUNITY
Start: 2019-07-24 | End: 2021-10-04 | Stop reason: SDUPTHER

## 2022-07-07 ENCOUNTER — VBI (OUTPATIENT)
Dept: ADMINISTRATIVE | Facility: OTHER | Age: 57
End: 2022-07-07

## 2022-08-04 ENCOUNTER — OFFICE VISIT (OUTPATIENT)
Dept: DERMATOLOGY | Facility: CLINIC | Age: 57
End: 2022-08-04
Payer: COMMERCIAL

## 2022-08-04 VITALS — WEIGHT: 144 LBS | HEIGHT: 64 IN | BODY MASS INDEX: 24.59 KG/M2 | TEMPERATURE: 97.7 F

## 2022-08-04 DIAGNOSIS — D22.9 MULTIPLE MELANOCYTIC NEVI: Primary | ICD-10-CM

## 2022-08-04 DIAGNOSIS — L81.4 LENTIGINES: ICD-10-CM

## 2022-08-04 DIAGNOSIS — D48.9 NEOPLASM OF UNCERTAIN BEHAVIOR: ICD-10-CM

## 2022-08-04 PROCEDURE — 11102 TANGNTL BX SKIN SINGLE LES: CPT | Performed by: DERMATOLOGY

## 2022-08-04 PROCEDURE — 11103 TANGNTL BX SKIN EA SEP/ADDL: CPT | Performed by: DERMATOLOGY

## 2022-08-04 PROCEDURE — 99204 OFFICE O/P NEW MOD 45 MIN: CPT | Performed by: DERMATOLOGY

## 2022-08-04 PROCEDURE — 88305 TISSUE EXAM BY PATHOLOGIST: CPT | Performed by: PATHOLOGY

## 2022-08-04 NOTE — PROGRESS NOTES
[facility] Note     Patient Name: Idalmis Bejarano  Encounter Date: 08/04/2022     Have you been cared for by a [facility] Dermatologist in the last 3 years and, if so, which one? No    · Have you traveled outside of the [address] [place] [address] in the past 3 months or outside of the [facility] area in the last 2 weeks? No     May we call your Preferred Phone number to discuss your specific medical information? Yes     May we leave a detailed message that includes your specific medical information? Yes      Today's Chief Concerns:   Concern #1:  Skin check    Concern #2:  Spot of concern     Past Medical History:  Have you personally ever had or currently have any of the following? · Skin cancer (such as Melanoma, Basal Cell Carcinoma, Squamous Cell Carcinoma? (If Yes, please provide more detail)- No  · Eczema: No  · Psoriasis: No  · HIV/AIDS: No  · Hepatitis B or C: No  · Tuberculosis: No  · Systemic Immunosuppression such as Diabetes, Biologic or Immunotherapy, Chemotherapy, Organ Transplantation, Bone Marrow Transplantation (If YES, please provide more detail): No  · Radiation Treatment (If YES, please provide more detail): No  · Any other major medical conditions/concerns? (If Yes, which types)- No      Family History:  Have any of your "first degree relatives" (parent, brother, sister, or child) had any of the following       · Skin cancer such as Melanoma or Merkel Cell Carcinoma or Pancreatic Cancer? YES, father unsure what type   · Eczema, Asthma, Hay Fever or Seasonal Allergies: No  · Psoriasis or Psoriatic Arthritis: No  · Do any other medical conditions seem to run in your family? If Yes, what condition and which relatives?   No    Current Medications:   (please update all dermatological medications before printing patient's AVS!)      Current Outpatient Medications:     ALPRAZolam (Xanax) 0 25 mg tablet, Take 1 tablet (0 25 mg total) by mouth 3 (three) times a day as needed (for flying), Disp: 30 tablet, Rfl: 0    cyanocobalamin 1,000 mcg/mL, Inject 1 mL (1,000 mcg total) into a muscle every 30 (thirty) days, Disp: 10 mL, Rfl: 1    multivitamin (THERAGRAN) TABS, Take 1 tablet by mouth daily, Disp: , Rfl:     b complex vitamins capsule, Take 1 capsule by mouth daily, Disp: , Rfl:     Cetirizine HCl (ZYRTEC PO), Take by mouth daily (Patient not taking: Reported on 8/4/2022), Disp: , Rfl:     Syringe/Needle, Disp, (SYRINGE 3CC/23GX1") 23G X 1" 3 ML MISC, Use every 30 (thirty) days (Patient not taking: Reported on 8/4/2022), Disp: 6 each, Rfl: 1      Review of Systems:  Have you recently had or currently have any of the following? If YES, what are you doing for the problem? · Fever, chills or unintended weight loss: No  · Sudden loss or change in your vision: No  · Nausea, vomiting or blood in your stool: No  · Painful or swollen joints: No  · Wheezing or cough: No  · Changing mole or non-healing wound: YES,   · Nosebleeds: YES,   · Excessive sweating: No  · Easy or prolonged bleeding? No  · Over the last 2 weeks, how often have you been bothered by the following problems? · Taking little interest or pleasure in doing things: 1 - Not at All  · Feeling down, depressed, or hopeless: 1 - Not at All  · Rapid heartbeat with epinephrine:  No    · FEMALES ONLY:    · Are you pregnant or planning to become pregnant? No  · Are you currently or planning to be nursing or breast feeding? No    · Any known allergies?       Allergies   Allergen Reactions    Barley Grass - Food Allergy Vomiting, Dermatitis, Dizziness, GI Intolerance, Headache, Itching and Lightheadedness    Oat - Food Allergy Dermatitis, GI Intolerance, Headache and Irritability         Physical Exam:     Was a chaperone (Derm Clinical Assistant) present throughout the entire Physical Exam? Yes     Did the Dermatology Team specifically  the patient on the importance of a Full Skin Exam to be sure that nothing is missed clinically?  Yes}  o Did the patient ultimately request or accept a Full Skin Exam?  Yes  o Did the patient specifically refuse to have the areas "under-the-bra" examined by the Dermatologist? No  o Did the patient specifically refuse to have the areas "under-the-underwear" examined by the Dermatologist? No    CONSTITUTIONAL:   Vitals:    08/04/22 0918   Temp: 97 7 °F (36 5 °C)   Weight: 65 3 kg (144 lb)   Height: 5' 4" (1 626 m)           PSYCH: Normal mood and affect  EYES: Normal conjunctiva  ENT: Normal lips and oral mucosa  CARDIOVASCULAR: No edema  RESPIRATORY: Normal respirations  HEME/LYMPH/IMMUNO:  No regional lymphadenopathy except as noted below in "ASSESSMENT AND PLAN BY DIAGNOSIS"    SKIN:  FULL ORGAN SYSTEM EXAM   Hair, Scalp, Ears, Face Normal except as noted below in Assessment   Neck Normal except as noted below in Assessment   Right Arm/Hand/Fingers Normal except as noted below in Assessment   Left Arm/Hand/Fingers Normal except as noted below in Assessment   Chest/Breasts/Axillae Viewed areas Normal except as noted below in Assessment   Abdomen, Umbilicus Normal except as noted below in Assessment   Back/Spine Normal except as noted below in Assessment   Groin/Genitalia/Buttocks Normal except as noted below in Assessment   Right Leg, Foot, Toes Normal except as noted below in Assessment   Left Leg, Foot, Toes Normal except as noted below in Assessment        MELANOCYTIC NEVI ("Moles")    Physical Exam:   Anatomic Location Affected & Morphological Description:  3 mm brown papule between third and fourth toe; congenital appearing nevus on posterior left calf; no regional LAD     Additional History of Present Condition:      Assessment and Plan:  Based on a thorough discussion of this condition and the management approach to it (including a comprehensive discussion of the known risks, side effects and potential benefits of treatment), the patient (family) agrees to implement the following specific plan:   When outside we recommend using a wide brim hat, sunglasses, long sleeve and pants, sunscreen with SPF 21+ with reapplication every 2 hours, or SPF specific clothing    Benign, reassured   Annual skin check     LENTIGINES    Physical Exam:   Anatomic Location Affected:  Trunk and arms    Morphological Description:  Light brown macules   Pertinent Positives:   Pertinent Negatives: Additional History of Present Condition:      Assessment and Plan:  Based on a thorough discussion of this condition and the management approach to it (including a comprehensive discussion of the known risks, side effects and potential benefits of treatment), the patient (family) agrees to implement the following specific plan:   When outside we recommend using a wide brim hat, sunglasses, long sleeve and pants, sunscreen with SPF 88+ with reapplication every 2 hours, or SPF specific clothing       LEVINE ANGIOMAS    Physical Exam:   Anatomic Location Affected:  trunk   Morphological Description:  Scattered cherry red, 1-4 mm papules   Pertinent Positives:   Pertinent Negatives: Additional History of Present Condition:      Assessment and Plan:  Based on a thorough discussion of this condition and the management approach to it (including a comprehensive discussion of the known risks, side effects and potential benefits of treatment), the patient (family) agrees to implement the following specific plan:   Monitor for changes   Benign, reassured      NEOPLASM OF UNCERTAIN BEHAVIOR OF SKIN    Physical Exam:   (Anatomic Location); (Size and Morphological Description); (Differential Diagnosis):  o A; left inner thigh; 1 cm skin colored papule; diff dx; seborrheic keratosis   o B;right inner thigh;  0 6 cm brown papule; diffdx; nevus rule out atypia    Pertinent Positives:   Pertinent Negatives:     Additional History of Present Condition:  Patient here for spots of concern states they have been changing  Assessment and Plan:   I have discussed with the patient that a sample of skin via a "skin biopsy would be potentially helpful to further make a specific diagnosis under the microscope   Based on a thorough discussion of this condition and the management approach to it (including a comprehensive discussion of the known risks, side effects and potential benefits of treatment), the patient (family) agrees to implement the following specific plan:    o Procedure:  Skin Biopsy  After a thorough discussion of treatment options and risk/benefits/alternatives (including but not limited to local pain, scarring, dyspigmentation, blistering, possible superinfection, and inability to confirm a diagnosis via histopathology), verbal and written consent were obtained and portion of the rash was biopsied for tissue sample  See below for consent that was obtained from patient and subsequent Procedure Note  PROCEDURE TANGENTIAL (SHAVE) BIOPSY NOTE: A      Performing Physician: Jelani Gregory   Anatomic Location; Clinical Description with size (cm); Pre-Op Diagnosis:   o     A; left inner thigh; 1 cm skin colored papule; diffdx; seborrheic keratosis      Post-op diagnosis: Same      Local anesthesia: 1% xylocaine with epi       Topical anesthesia: None     Hemostasis: Aluminum chloride       After obtaining informed consent  at which time there was a discussion about the purpose of biopsy  and low risks of infection and bleeding  The area was prepped and draped in the usual fashion  Anesthesia was obtained with 1% lidocaine with epinephrine  A shave biopsy to an appropriate sampling depth was obtained by Shave (Dermablade or 15 blade) The resulting wound was covered with surgical ointment and bandaged appropriately  The patient tolerated the procedure well without complications and was without signs of functional compromise  Specimen has been sent for review by Dermatopathology      Standard post-procedure care has been explained and has been included in written form within the patient's copy of Informed Consent  PROCEDURE TANGENTIAL (SHAVE) BIOPSY NOTE: B      Performing Physician: Gely Holloway   Anatomic Location; Clinical Description with size (cm); Pre-Op Diagnosis:         B;right inner thigh;  0 6 cm brown papule; diffdx; nevus rule out atypia    Post-op diagnosis: Same      Local anesthesia: 1% xylocaine with epi       Topical anesthesia: None     Hemostasis: Aluminum chloride       After obtaining informed consent  at which time there was a discussion about the purpose of biopsy  and low risks of infection and bleeding  The area was prepped and draped in the usual fashion  Anesthesia was obtained with 1% lidocaine with epinephrine  A shave biopsy to an appropriate sampling depth was obtained by Shave (Dermablade or 15 blade) The resulting wound was covered with surgical ointment and bandaged appropriately  The patient tolerated the procedure well without complications and was without signs of functional compromise  Specimen has been sent for review by Dermatopathology  Standard post-procedure care has been explained and has been included in written form within the patient's copy of Informed Consent  INFORMED CONSENT DISCUSSION AND POST-OPERATIVE INSTRUCTIONS FOR PATIENT    I   RATIONALE FOR PROCEDURE  I understand that a skin biopsy allows the Dermatologist to test a lesion or rash under the microscope to obtain a diagnosis  It usually involves numbing the area with numbing medication and removing a small piece of skin; sometimes the area will be closed with sutures  In this specific procedure, sutures are not usually needed  If any sutures are placed, then they are usually need to be removed in 2 weeks or less  I understand that my Dermatologist recommends that a skin "shave" biopsy be performed today    A local anesthetic, similar to the kind that a dentist uses when filling a cavity, will be injected with a very small needle into the skin area to be sampled  The injected skin and tissue underneath "will go to sleep and become numb so no pain should be felt afterwards  An instrument shaped like a tiny "razor blade" (shave biopsy instrument) will be used to cut a small piece of tissue and skin from the area so that a sample of tissue can be taken and examined more closely under the microscope  A slight amount of bleeding will occur, but it will be stopped with direct pressure and a pressure bandage and any other appropriate methods  I understands that a scar will form where the wound was created  Surgical ointment will be applied to help protect the wound  Sutures are not usually needed  II   RISKS AND POTENTIAL COMPLICATIONS   I understand the risks and potential complications of a skin biopsy include but are not limited to the following:   Bleeding   Infection   Pain   Scar/keloid   Skin discoloration   Incomplete Removal   Recurrence   Nerve Damage/Numbness/Loss of Function   Allergic Reaction to Anesthesia   Biopsies are diagnostic procedures and based on findings additional treatment or evaluation may be required   Loss or destruction of specimen resulting in no additional findings    My Dermatologist has explained to me the nature of the condition, the nature of the procedure, and the benefits to be reasonably expected compared with alternative approaches  My Dermatologist has discussed the likelihood of major risks or complications of this procedure including the specific risks listed above, such as bleeding, infection, and scarring/keloid  I understand that a scar is expected after this procedure  I understand that my physician cannot predict if the scar will form a "keloid," which extends beyond the borders of the wound that is created  A keloid is a thick, painful, and bumpy scar    A keloid can be difficult to treat, as it does not always respond well to therapy, which includes injecting cortisone directly into the keloid every few weeks  While this usually reduces the pain and size of the scar, it does not eliminate it  I understand that photographs may be taken before and after the procedure  These will be maintained as part of the medical providers confidential records and may not be made available to me  I further authorize the medical provider to use the photographs for teaching purposes or to illustrate scientific papers, books, or lectures if in his/her judgment, medical research, education, or science may benefit from its use  I have had an opportunity to fully inquire about the risks and benefits of this procedure and its alternatives  I have been given ample time and opportunity to ask questions and to seek a second opinion if I wished to do so  I acknowledge that there have specifically been no guarantees as to the cosmetic results from the procedure  I am aware that with any procedure there is always the possibility of an unexpected complication  III  POST-PROCEDURAL CARE (WHAT YOU WILL NEED TO DO "AFTER THE BIOPSY" TO OPTIMIZE HEALING)     Keep the area clean and dry  Try NOT to remove the bandage or get it wet for the first 24 hours   Gently clean the area and apply surgical ointment (such as Vaseline petrolatum ointment, which is available "over the counter" and not a prescription) to the biopsy site for up to 2 weeks straight  This acts to protect the wound from the outside world   Sutures are not usually placed in this procedure  If any sutures were placed, return for suture removal as instructed (generally 1 week for the face, 2 weeks for the body)   Take Acetaminophen (Tylenol) for discomfort, if no contraindications  Ibuprofen or aspirin could make bleeding worse       Call our office immediately for signs of infection: fever, chills, increased redness, warmth, tenderness, discomfort/pain, or pus or foul smell coming from the wound  WHAT TO DO IF THERE IS ANY BLEEDING? If a small amount of bleeding is noticed, place a clean cloth over the area and apply firm pressure for ten minutes  Check the wound after 10 minutes of direct pressure  If bleeding persists, try one more time for an additional 10 minutes of direct pressure on the area  If the bleeding becomes heavier or does not stop after the second attempt, or if you have any other questions about this procedure, then please call your [facility] Dermatologist by calling [phone] (SKIN)  I hereby acknowledge that I have reviewed and verified the site with my Dermatologist and have requested and authorized my Dermatologist to proceed with the procedure            Scribe Attestation    I,:  Matthew Samuel MA am acting as a scribe while in the presence of the attending physician :       I,:  Gordon Miner MD personally performed the services described in this documentation    as scribed in my presence :

## 2022-08-04 NOTE — PATIENT INSTRUCTIONS
Assessment and Plan:  Based on a thorough discussion of this condition and the management approach to it (including a comprehensive discussion of the known risks, side effects and potential benefits of treatment), the patient (family) agrees to implement the following specific plan:  When outside we recommend using a wide brim hat, sunglasses, long sleeve and pants, sunscreen with SPF 12+ with reapplication every 2 hours, or SPF specific clothing   Benign, reassured  Annual skin check     Melanocytic Nevi  Melanocytic nevi ("moles") are tan or brown, raised or flat areas of the skin which have an increased number of melanocytes  Melanocytes are the cells in our body which make pigment and account for skin color  Some moles are present at birth (I e , "congenital nevi"), while others come up later in life (i e , "acquired nevi")  The sun can stimulate the body to make more moles  Sunburns are not the only thing that triggers more moles  Chronic sun exposure can do it too  Clinically distinguishing a healthy mole from melanoma may be difficult, even for experienced dermatologists  The "ABCDE's" of moles have been suggested as a means of helping to alert a person to a suspicious mole and the possible increased risk of melanoma  The suggestions for raising alert are as follows:    Asymmetry: Healthy moles tend to be symmetric, while melanomas are often asymmetric  Asymmetry means if you draw a line through the mole, the two halves do not match in color, size, shape, or surface texture  Asymmetry can be a result of rapid enlargement of a mole, the development of a raised area on a previously flat lesion, scaling, ulceration, bleeding or scabbing within the mole  Any mole that starts to demonstrate "asymmetry" should be examined promptly by a board certified dermatologist      Border: Healthy moles tend to have discrete, even borders    The border of a melanoma often blends into the normal skin and does not sharply delineate the mole from normal skin  Any mole that starts to demonstrate "uneven borders" should be examined promptly by a board certified dermatologist      Color: Healthy moles tend to be one color throughout  Melanomas tend to be made up of different colors ranging from dark black, blue, white, or red  Any mole that demonstrates a color change should be examined promptly by a board certified dermatologist      Diameter: Healthy moles tend to be smaller than 0 6 cm in size; an exception are "congenital nevi" that can be larger  Melanomas tend to grow and can often be greater than 0 6 cm (1/4 of an inch, or the size of a pencil eraser)  This is only a guideline, and many normal moles may be larger than 0 6 cm without being unhealthy  Any mole that starts to change in size (small to bigger or bigger to smaller) should be examined promptly by a board certified dermatologist      Evolving: Healthy moles tend to "stay the same "  Melanomas may often show signs of change or evolution such as a change in size, shape, color, or elevation  Any mole that starts to itch, bleed, crust, burn, hurt, or ulcerate or demonstrate a change or evolution should be examined promptly by a board certified dermatologist       Assessment and Plan:  Based on a thorough discussion of this condition and the management approach to it (including a comprehensive discussion of the known risks, side effects and potential benefits of treatment), the patient (family) agrees to implement the following specific plan:  When outside we recommend using a wide brim hat, sunglasses, long sleeve and pants, sunscreen with SPF 89+ with reapplication every 2 hours, or SPF specific clothing       What is a lentigo? A lentigo is a pigmented flat or slightly raised lesion with a clearly defined edge  Unlike an ephelis (freckle), it does not fade in the winter months  There are several kinds of lentigo    The name lentigo originally referred to its appearance resembling a small lentil  The plural of lentigo is lentigines, although lentigos is also in common use  Who gets lentigines? Lentigines can affect males and females of all ages and races  Solar lentigines are especially prevalent in fair skinned adults  Lentigines associated with syndromes are present at birth or arise during childhood  What causes lentigines? Common forms of lentigo are due to exposure to ultraviolet radiation:  Sun damage including sunburn   Indoor tanning   Phototherapy, especially photochemotherapy (PUVA)    Ionizing radiation, eg radiation therapy, can also cause lentigines  Several familial syndromes associated with widespread lentigines originate from mutations in Vitor-MAP kinase, mTOR signaling and PTEN pathways  What is the treatment for lentigines? Most lentigines are left alone  Attempts to lighten them may not be successful  The following approaches are used:  SPF 50+ broad-spectrum sunscreen   Hydroquinone bleaching cream   Alpha hydroxy acids   Vitamin C   Retinoids   Azelaic acid   Chemical peels  Individual lesions can be permanently removed using:  Cryotherapy   Intense pulsed light   Pigment lasers    How can lentigines be prevented? Lentigines associated with exposure ultraviolet radiation can be prevented by very careful sun protection  Clothing is more successful at preventing new lentigines than are sunscreens  What is the outlook for lentigines? Lentigines usually persist  They may increase in number with age and sun exposure  Some in sun-protected sites may fade and disappear      Assessment and Plan:  Based on a thorough discussion of this condition and the management approach to it (including a comprehensive discussion of the known risks, side effects and potential benefits of treatment), the patient (family) agrees to implement the following specific plan:  Monitor for changes  Benign, reassured      Assessment and Plan:    Cherry angioma, also known as [employer] spots, are benign vascular skin lesions  A "cherry angioma" is a firm red, blue or purple papule, 0 1-1 cm in diameter  When thrombosed, they can appear black in colour until evaluated with a dermatoscope when the red or purple colour is more easily seen  Cherry angioma may develop on any part of the body but most often appear on the scalp, face, lips and trunk  An angioma is due to proliferating endothelial cells; these are the cells that line the inside of a blood vessel  Angiomas can arise in early life or later in life; the most common type of angioma is a cherry angioma  Cherry angiomas are very common in males and females of any age or race  They are more noticeable in white skin than in skin of colour  They markedly increase in number from about the age of 36  There may be a family history of similar lesions  Eruptive cherry angiomas have been rarely reported to be associated with internal malignancy  The cause of angiomas is unknown  Genetic analysis of cherry angiomas has shown that they frequently carry specific somatic missense mutations in the GNAQ and GNA11 (Q209H) genes, which are involved in other vascular and melanocytic proliferations  Assessment and Plan:  Based on a thorough discussion of this condition and the management approach to it (including a comprehensive discussion of the known risks, side effects and potential benefits of treatment), the patient (family) agrees to implement the following specific plan:  Monitor for changes  Benign, reassured      Seborrheic Keratosis  A seborrheic keratosis is a harmless warty spot that appears during adult life as a common sign of skin aging  Seborrheic keratoses can arise on any area of skin, covered or uncovered, with the usual exception of the palms and soles  They do not arise from mucous membranes  Seborrheic keratoses can have highly variable appearance  Seborrheic keratoses are extremely common   It has been estimated that over 90% of adults over the age of 61 years have one or more of them  They occur in males and females of all races, typically beginning to erupt in the 35s or 45s  They are uncommon under the age of 21 years  The precise cause of seborrhoeic keratoses is not known  Seborrhoeic keratoses are considered degenerative in nature  As time goes by, seborrheic keratoses tend to become more numerous  Some people inherit a tendency to develop a very large number of them; some people may have hundreds of them  There is no easy way to remove multiple lesions on a single occasion  Unless a specific lesion is "inflamed" and is causing pain or stinging/burning or is bleeding, most insurance companies do not authorize treatment  I   RATIONALE FOR PROCEDURE  I understand that a skin biopsy allows the Dermatologist to test a lesion or rash under the microscope to obtain a diagnosis  It usually involves numbing the area with numbing medication and removing a small piece of skin; sometimes the area will be closed with sutures  In this specific procedure, sutures are not usually needed  If any sutures are placed, then they are usually need to be removed in 2 weeks or less  I understand that my Dermatologist recommends that a skin "shave" biopsy be performed today  A local anesthetic, similar to the kind that a dentist uses when filling a cavity, will be injected with a very small needle into the skin area to be sampled  The injected skin and tissue underneath "will go to sleep and become numb so no pain should be felt afterwards  An instrument shaped like a tiny "razor blade" (shave biopsy instrument) will be used to cut a small piece of tissue and skin from the area so that a sample of tissue can be taken and examined more closely under the microscope  A slight amount of bleeding will occur, but it will be stopped with direct pressure and a pressure bandage and any other appropriate methods  I understands that a scar will form where the wound was created  Surgical ointment will be applied to help protect the wound  Sutures are not usually needed  II   RISKS AND POTENTIAL COMPLICATIONS   I understand the risks and potential complications of a skin biopsy include but are not limited to the following:  Bleeding  Infection  Pain  Scar/keloid  Skin discoloration  Incomplete Removal  Recurrence  Nerve Damage/Numbness/Loss of Function  Allergic Reaction to Anesthesia  Biopsies are diagnostic procedures and based on findings additional treatment or evaluation may be required  Loss or destruction of specimen resulting in no additional findings    My Dermatologist has explained to me the nature of the condition, the nature of the procedure, and the benefits to be reasonably expected compared with alternative approaches  My Dermatologist has discussed the likelihood of major risks or complications of this procedure including the specific risks listed above, such as bleeding, infection, and scarring/keloid  I understand that a scar is expected after this procedure  I understand that my physician cannot predict if the scar will form a "keloid," which extends beyond the borders of the wound that is created  A keloid is a thick, painful, and bumpy scar  A keloid can be difficult to treat, as it does not always respond well to therapy, which includes injecting cortisone directly into the keloid every few weeks  While this usually reduces the pain and size of the scar, it does not eliminate it  I understand that photographs may be taken before and after the procedure  These will be maintained as part of the medical providers confidential records and may not be made available to me    I further authorize the medical provider to use the photographs for teaching purposes or to illustrate scientific papers, books, or lectures if in his/her judgment, medical research, education, or science may benefit from its use  I have had an opportunity to fully inquire about the risks and benefits of this procedure and its alternatives  I have been given ample time and opportunity to ask questions and to seek a second opinion if I wished to do so  I acknowledge that there have specifically been no guarantees as to the cosmetic results from the procedure  I am aware that with any procedure there is always the possibility of an unexpected complication  III  POST-PROCEDURAL CARE (WHAT YOU WILL NEED TO DO "AFTER THE BIOPSY" TO OPTIMIZE HEALING)    Keep the area clean and dry  Try NOT to remove the bandage or get it wet for the first 24 hours  Gently clean the area and apply surgical ointment (such as Vaseline petrolatum ointment, which is available "over the counter" and not a prescription) to the biopsy site for up to 2 weeks straight  This acts to protect the wound from the outside world  Sutures are not usually placed in this procedure  If any sutures were placed, return for suture removal as instructed (generally 1 week for the face, 2 weeks for the body)  Take Acetaminophen (Tylenol) for discomfort, if no contraindications  Ibuprofen or aspirin could make bleeding worse  Call our office immediately for signs of infection: fever, chills, increased redness, warmth, tenderness, discomfort/pain, or pus or foul smell coming from the wound  WHAT TO DO IF THERE IS ANY BLEEDING? If a small amount of bleeding is noticed, place a clean cloth over the area and apply firm pressure for ten minutes  Check the wound after 10 minutes of direct pressure  If bleeding persists, try one more time for an additional 10 minutes of direct pressure on the area  If the bleeding becomes heavier or does not stop after the second attempt, or if you have any other questions about this procedure, then please call your [facility] Dermatologist by calling [phone] (SKIN)       I hereby acknowledge that I have reviewed and verified the site with my Dermatologist and have requested and authorized my Dermatologist to proceed with the procedure

## 2022-08-09 DIAGNOSIS — Z13.6 SCREENING FOR HYPERTENSION: ICD-10-CM

## 2022-08-09 DIAGNOSIS — Z13.29 SCREENING FOR HYPOTHYROIDISM: ICD-10-CM

## 2022-08-09 DIAGNOSIS — Z13.220 SCREENING FOR HYPERLIPIDEMIA: ICD-10-CM

## 2022-08-09 DIAGNOSIS — Z00.00 ROUTINE GENERAL MEDICAL EXAMINATION AT A HEALTH CARE FACILITY: Primary | ICD-10-CM

## 2022-08-23 LAB
BASOPHILS # BLD AUTO: 0.1 X10E3/UL (ref 0–0.2)
BASOPHILS NFR BLD AUTO: 1 %
EOSINOPHIL # BLD AUTO: 0.1 X10E3/UL (ref 0–0.4)
EOSINOPHIL NFR BLD AUTO: 2 %
ERYTHROCYTE [DISTWIDTH] IN BLOOD BY AUTOMATED COUNT: 12.1 % (ref 11.7–15.4)
HCT VFR BLD AUTO: 43 % (ref 34–46.6)
HGB BLD-MCNC: 14.9 G/DL (ref 11.1–15.9)
IMM GRANULOCYTES # BLD: 0 X10E3/UL (ref 0–0.1)
IMM GRANULOCYTES NFR BLD: 0 %
LYMPHOCYTES # BLD AUTO: 1.9 X10E3/UL (ref 0.7–3.1)
LYMPHOCYTES NFR BLD AUTO: 46 %
MCH RBC QN AUTO: 30.2 PG (ref 26.6–33)
MCHC RBC AUTO-ENTMCNC: 34.7 G/DL (ref 31.5–35.7)
MCV RBC AUTO: 87 FL (ref 79–97)
MONOCYTES # BLD AUTO: 0.4 X10E3/UL (ref 0.1–0.9)
MONOCYTES NFR BLD AUTO: 9 %
NEUTROPHILS # BLD AUTO: 1.8 X10E3/UL (ref 1.4–7)
NEUTROPHILS NFR BLD AUTO: 42 %
PLATELET # BLD AUTO: 282 X10E3/UL (ref 150–450)
RBC # BLD AUTO: 4.93 X10E6/UL (ref 3.77–5.28)
WBC # BLD AUTO: 4.3 X10E3/UL (ref 3.4–10.8)

## 2022-08-24 LAB
ALBUMIN SERPL-MCNC: 4.8 G/DL (ref 3.8–4.9)
ALBUMIN/GLOB SERPL: 2.4 {RATIO} (ref 1.2–2.2)
ALP SERPL-CCNC: 59 IU/L (ref 44–121)
ALT SERPL-CCNC: 24 IU/L (ref 0–32)
AST SERPL-CCNC: 22 IU/L (ref 0–40)
BILIRUB SERPL-MCNC: 0.3 MG/DL (ref 0–1.2)
BUN SERPL-MCNC: 15 MG/DL (ref 6–24)
BUN/CREAT SERPL: 16 (ref 9–23)
CALCIUM SERPL-MCNC: 9.7 MG/DL (ref 8.7–10.2)
CHLORIDE SERPL-SCNC: 103 MMOL/L (ref 96–106)
CHOLEST SERPL-MCNC: 190 MG/DL (ref 100–199)
CHOLEST/HDLC SERPL: 2.8 RATIO (ref 0–4.4)
CO2 SERPL-SCNC: 27 MMOL/L (ref 20–29)
CREAT SERPL-MCNC: 0.93 MG/DL (ref 0.57–1)
EGFR: 72 ML/MIN/1.73
GLOBULIN SER-MCNC: 2 G/DL (ref 1.5–4.5)
GLUCOSE SERPL-MCNC: 93 MG/DL (ref 65–99)
HDLC SERPL-MCNC: 68 MG/DL
LDLC SERPL CALC-MCNC: 106 MG/DL (ref 0–99)
POTASSIUM SERPL-SCNC: 4.1 MMOL/L (ref 3.5–5.2)
PROT SERPL-MCNC: 6.8 G/DL (ref 6–8.5)
SL AMB VLDL CHOLESTEROL CALC: 16 MG/DL (ref 5–40)
SODIUM SERPL-SCNC: 143 MMOL/L (ref 134–144)
TRIGL SERPL-MCNC: 87 MG/DL (ref 0–149)
TSH SERPL DL<=0.005 MIU/L-ACNC: 1.27 UIU/ML (ref 0.45–4.5)

## 2022-09-06 ENCOUNTER — HOSPITAL ENCOUNTER (OUTPATIENT)
Dept: RADIOLOGY | Facility: HOSPITAL | Age: 57
Discharge: HOME | End: 2022-09-06
Payer: COMMERCIAL

## 2022-09-06 VITALS — WEIGHT: 136 LBS | HEIGHT: 64 IN | BODY MASS INDEX: 23.22 KG/M2

## 2022-09-06 DIAGNOSIS — Z12.31 ENCOUNTER FOR SCREENING MAMMOGRAM FOR BREAST CANCER: ICD-10-CM

## 2022-09-06 PROCEDURE — 77063 BREAST TOMOSYNTHESIS BI: CPT

## 2022-09-06 PROCEDURE — 77067 SCR MAMMO BI INCL CAD: CPT

## 2022-09-18 PROBLEM — Z78.0 MENOPAUSE: Status: RESOLVED | Noted: 2020-09-09 | Resolved: 2022-09-18

## 2022-09-18 PROBLEM — Z13.820 SCREENING FOR OSTEOPOROSIS: Status: RESOLVED | Noted: 2020-09-09 | Resolved: 2022-09-18

## 2022-09-18 NOTE — PATIENT INSTRUCTIONS
DISCUSSED HEALTH ISSUES  HEALTHY DIET AND EXERCISE  BW WILL BE OBTAINED  MAMMOGRAPHY   RECOMMEND CALCIUM 9447-6089 MG DAILY  VITAMIN D3  1000 IU DAILY  RV IN 1 YEAR FOR ANNUAL EXAM, SOONER IF NEEDED      Recent Results (from the past 672 hour(s))   CBC and differential    Collection Time: 08/23/22  8:38 AM   Result Value Ref Range    White Blood Cell Count 4 3 3 4 - 10 8 x10E3/uL    Red Blood Cell Count 4 93 3 77 - 5 28 x10E6/uL    Hemoglobin 14 9 11 1 - 15 9 g/dL    HCT 43 0 34 0 - 46 6 %    MCV 87 79 - 97 fL    MCH 30 2 26 6 - 33 0 pg    MCHC 34 7 31 5 - 35 7 g/dL    RDW 12 1 11 7 - 15 4 %    Platelet Count 721 031 - 450 x10E3/uL    Neutrophils 42 Not Estab  %    Lymphocytes 46 Not Estab  %    Monocytes 9 Not Estab  %    Eosinophils 2 Not Estab  %    Basophils PCT 1 Not Estab  %    Neutrophils (Absolute) 1 8 1 4 - 7 0 x10E3/uL    Lymphocytes (Absolute) 1 9 0 7 - 3 1 x10E3/uL    Monocytes (Absolute) 0 4 0 1 - 0 9 x10E3/uL    Eosinophils (Absolute) 0 1 0 0 - 0 4 x10E3/uL    Basophils ABS 0 1 0 0 - 0 2 x10E3/uL    Immature Granulocytes 0 Not Estab  %    Immature Granulocytes (Absolute) 0 0 0 0 - 0 1 x10E3/uL   Comprehensive metabolic panel    Collection Time: 08/23/22  8:38 AM   Result Value Ref Range    Glucose, Random 93 65 - 99 mg/dL    BUN 15 6 - 24 mg/dL    Creatinine 0 93 0 57 - 1 00 mg/dL    eGFR 72 >59 mL/min/1 73    SL AMB BUN/CREATININE RATIO 16 9 - 23    Sodium 143 134 - 144 mmol/L    Potassium 4 1 3 5 - 5 2 mmol/L    Chloride 103 96 - 106 mmol/L    CO2 27 20 - 29 mmol/L    CALCIUM 9 7 8 7 - 10 2 mg/dL    Protein, Total 6 8 6 0 - 8 5 g/dL    Albumin 4 8 3 8 - 4 9 g/dL    Globulin, Total 2 0 1 5 - 4 5 g/dL    Albumin/Globulin Ratio 2 4 (H) 1 2 - 2 2    TOTAL BILIRUBIN 0 3 0 0 - 1 2 mg/dL    Alk Phos Isoenzymes 59 44 - 121 IU/L    AST 22 0 - 40 IU/L    ALT 24 0 - 32 IU/L   Lipid panel    Collection Time: 08/23/22  8:38 AM   Result Value Ref Range    Cholesterol, Total 190 100 - 199 mg/dL    Triglycerides 87 0 - 149 mg/dL    HDL 68 >39 mg/dL    VLDL Cholesterol Calculated 16 5 - 40 mg/dL    LDL Calculated 106 (H) 0 - 99 mg/dL    T   Chol/HDL Ratio 2 8 0 0 - 4 4 ratio   TSH, 3rd generation    Collection Time: 08/23/22  8:38 AM   Result Value Ref Range    TSH 1 270 0 450 - 4 500 uIU/mL

## 2022-09-19 ENCOUNTER — OFFICE VISIT (OUTPATIENT)
Dept: FAMILY MEDICINE CLINIC | Facility: CLINIC | Age: 57
End: 2022-09-19
Payer: COMMERCIAL

## 2022-09-19 VITALS
SYSTOLIC BLOOD PRESSURE: 122 MMHG | WEIGHT: 135 LBS | DIASTOLIC BLOOD PRESSURE: 72 MMHG | TEMPERATURE: 98 F | HEIGHT: 63 IN | RESPIRATION RATE: 12 BRPM | HEART RATE: 75 BPM | OXYGEN SATURATION: 98 % | BODY MASS INDEX: 23.92 KG/M2

## 2022-09-19 DIAGNOSIS — Z12.11 COLON CANCER SCREENING: ICD-10-CM

## 2022-09-19 DIAGNOSIS — Z13.6 SCREENING FOR HYPERTENSION: ICD-10-CM

## 2022-09-19 DIAGNOSIS — Z13.29 SCREENING FOR HYPOTHYROIDISM: ICD-10-CM

## 2022-09-19 DIAGNOSIS — Z23 NEED FOR INFLUENZA VACCINATION: ICD-10-CM

## 2022-09-19 DIAGNOSIS — K90.0 ACD (ADULT CELIAC DISEASE): ICD-10-CM

## 2022-09-19 DIAGNOSIS — Z12.4 SCREENING FOR CERVICAL CANCER: ICD-10-CM

## 2022-09-19 DIAGNOSIS — Z13.220 SCREENING FOR HYPERLIPIDEMIA: ICD-10-CM

## 2022-09-19 DIAGNOSIS — Z12.31 ENCOUNTER FOR SCREENING MAMMOGRAM FOR BREAST CANCER: ICD-10-CM

## 2022-09-19 DIAGNOSIS — Z00.00 ROUTINE GENERAL MEDICAL EXAMINATION AT A HEALTH CARE FACILITY: Primary | ICD-10-CM

## 2022-09-19 PROCEDURE — 3725F SCREEN DEPRESSION PERFORMED: CPT | Performed by: FAMILY MEDICINE

## 2022-09-19 PROCEDURE — 90682 RIV4 VACC RECOMBINANT DNA IM: CPT

## 2022-09-19 PROCEDURE — 99396 PREV VISIT EST AGE 40-64: CPT | Performed by: FAMILY MEDICINE

## 2022-09-19 PROCEDURE — 90471 IMMUNIZATION ADMIN: CPT

## 2022-09-19 PROCEDURE — 93000 ELECTROCARDIOGRAM COMPLETE: CPT | Performed by: FAMILY MEDICINE

## 2022-09-19 NOTE — PROGRESS NOTES
Depression Screening and Follow-up Plan: Patient was screened for depression during today's encounter  They screened negative with a PHQ-2 score of 0     FAMILY [facility] MAINTENANCE OFFICE VISIT  [facility] [place] [address] PHYSICIANS    NAME: Shannan Voss  AGE: 64 y o   SEX: female  : 1965     DATE: 2022    Assessment and Plan     Problem List Items Addressed This Visit        Digestive    ACD (adult celiac disease)       Other    Colon cancer screening    Relevant Orders    Cologuard    Encounter for screening mammogram for breast cancer    Screening for hypothyroidism    Screening for hyperlipidemia    Screening for hypertension    Relevant Orders    POCT ECG (Completed)    Routine general medical examination at a health care facility - Primary    Need for influenza vaccination    Relevant Orders    influenza vaccine, quadrivalent, recombinant, PF, 0 5 mL, for patients 18 yr+ (FLUBLOK) (Completed)               Return in about 1 year (around 2023) for Annual physical         Chief Complaint     Chief Complaint   Patient presents with    Annual Exam       History of Present Illness     [facility] [address] RECORD  NO CONCERNS AT THIS TIME        Well Adult Physical   Patient here for a comprehensive physical exam       Diet and Physical Activity  Diet: well balanced diet  Weight concerns: None, patient's BMI is between 18 5-24 9  Exercise: frequently      Depression Screen  PHQ-2/9 Depression Screening    Little interest or pleasure in doing things: 0 - not at all  Feeling down, depressed, or hopeless: 0 - not at all  PHQ-2 Score: 0  PHQ-2 Interpretation: Negative depression screen          General Health  Hearing: Normal:  bilateral  Vision: no vision problems  Dental: regular dental visits    Reproductive Health          The following portions of the patient's history were reviewed and updated as appropriate: allergies, current medications, past family history, past medical history, past social history, past surgical history and problem list     Review of Systems     Review of Systems   Constitutional: Negative for chills, fatigue and fever  HENT: Negative for congestion, ear discharge, ear pain, mouth sores, postnasal drip, sore throat and trouble swallowing  Eyes: Negative for pain, discharge and visual disturbance  Respiratory: Negative for cough, shortness of breath and wheezing  Cardiovascular: Negative for chest pain, palpitations and leg swelling  Gastrointestinal: Negative for abdominal distention, abdominal pain, blood in stool, diarrhea and nausea  Endocrine: Negative for polydipsia, polyphagia and polyuria  Genitourinary: Negative for dysuria, frequency, hematuria and urgency  Musculoskeletal: Negative for arthralgias, gait problem and joint swelling  Skin: Negative for pallor and rash  Neurological: Negative for dizziness, syncope, speech difficulty, weakness, light-headedness, numbness and headaches  Hematological: Negative for adenopathy  Psychiatric/Behavioral: Negative for behavioral problems, confusion and sleep disturbance  The patient is not nervous/anxious          Past Medical History     Past Medical History:   Diagnosis Date    Celiac disease     last assessed: 9/27/2016    Chicken pox     AS PER PT       Past Surgical History     Past Surgical History:   Procedure Laterality Date    ROTATOR CUFF REPAIR  2017       Social History     Social History     Socioeconomic History    Marital status: /Civil Union     Spouse name: None    Number of children: None    Years of education: None    Highest education level: None   Occupational History    None   Tobacco Use    Smoking status: Former Smoker    Smokeless tobacco: Never Used   Vaping Use    Vaping Use: Never used   Substance and Sexual Activity    Alcohol use: Yes     Comment: social    Drug use: Never    Sexual activity: None   Other Topics Concern    None   Social History Narrative    None     Social Determinants of Health     Financial Resource Strain: Not on file   Food Insecurity: Not on file   Transportation Needs: Not on file   Physical Activity: Not on file   Stress: Not on file   Social Connections: Not on file   Intimate Partner Violence: Not on file   Housing Stability: Not on file       Family History     Family History   Problem Relation Age of Onset    No Known Problems Mother     Cancer Father         bladder    Dementia Father     Depression Father     Heart disease Family     Diabetes Family     Cancer Family     Substance Abuse Neg Hx     Mental illness Neg Hx        Current Medications       Current Outpatient Medications:     ALPRAZolam (Xanax) 0 25 mg tablet, Take 1 tablet (0 25 mg total) by mouth 3 (three) times a day as needed (for flying), Disp: 30 tablet, Rfl: 0    cyanocobalamin 1,000 mcg/mL, Inject 1 mL (1,000 mcg total) into a muscle every 30 (thirty) days, Disp: 10 mL, Rfl: 1    multivitamin (THERAGRAN) TABS, Take 1 tablet by mouth daily, Disp: , Rfl:     Syringe/Needle, Disp, (SYRINGE 3CC/23GX1") 23G X 1" 3 ML MISC, Use every 30 (thirty) days, Disp: 6 each, Rfl: 1     Allergies     Allergies   Allergen Reactions    Barley Grass - Food Allergy Vomiting, Dermatitis, Dizziness, GI Intolerance, Headache, Itching, Lightheadedness and Diarrhea    Oat - Food Allergy Dermatitis, GI Intolerance, Headache and Irritability    Wheat Bran - Food Allergy Diarrhea, Dermatitis, GI Intolerance, Vomiting, Irritability, Anxiety and Headache     All wheat products       Objective     /72   Pulse 75   Temp 98 °F (36 7 °C) (Temporal)   Resp 12   Ht 5' 3 25" (1 607 m)   Wt 61 2 kg (135 lb)   SpO2 98%   BMI 23 73 kg/m²      Physical Exam  Vitals reviewed  Constitutional:       Appearance: She is well-developed  HENT:      Head: Normocephalic and atraumatic        Right Ear: External ear normal  Left Ear: External ear normal       Nose: Nose normal    Eyes:      General:         Right eye: No discharge  Left eye: No discharge  Conjunctiva/sclera: Conjunctivae normal       Pupils: Pupils are equal, round, and reactive to light  Neck:      Thyroid: No thyromegaly  Cardiovascular:      Rate and Rhythm: Normal rate and regular rhythm  Heart sounds: Normal heart sounds  No murmur heard  Pulmonary:      Effort: Pulmonary effort is normal       Breath sounds: Normal breath sounds  No wheezing or rales  Abdominal:      General: Bowel sounds are normal  There is no distension  Palpations: Abdomen is soft  There is no mass  Tenderness: There is no abdominal tenderness  There is no guarding or rebound  Genitourinary:     Vagina: Normal  No vaginal discharge  Rectum: Guaiac result negative  Musculoskeletal:         General: No tenderness or deformity  Normal range of motion  Cervical back: Normal range of motion and neck supple  Lymphadenopathy:      Cervical: No cervical adenopathy  Skin:     General: Skin is warm and dry  Findings: No erythema or rash  Neurological:      Mental Status: She is alert and oriented to person, place, and time  Cranial Nerves: No cranial nerve deficit  Motor: No abnormal muscle tone  Coordination: Coordination normal       Deep Tendon Reflexes: Reflexes are normal and symmetric  Psychiatric:         Behavior: Behavior normal          Thought Content:  Thought content normal          Judgment: Judgment normal            No exam data present    Health Maintenance     Health Maintenance   Topic Date Due    COVID-19 Vaccine (3 - Booster for Pfizer series) 07/10/2021    Colorectal Cancer Screening  07/31/2022    Breast Cancer Screening: Mammogram  09/06/2023    Depression Screening  09/19/2023    BMI: Adult  09/19/2023    Annual Physical  09/19/2023    Cervical Cancer Screening  07/24/2024    DTaP,Tdap,and Td Vaccines (2 - Td or Tdap) 07/24/2029    HIV Screening  Completed    Hepatitis C Screening  Completed    Influenza Vaccine  Completed    Pneumococcal Vaccine: Pediatrics (0 to 5 Years) and At-Risk Patients (6 to 59 Years)  Aged Out    HIB Vaccine  Aged Out    Hepatitis B Vaccine  Aged Out    IPV Vaccine  Aged Out    Hepatitis A Vaccine  Aged Out    Meningococcal ACWY Vaccine  Aged Out    HPV Vaccine  Aged [employer] History   Administered Date(s) Administered    COVID-19 PFIZER VACCINE 0 3 ML IM 01/21/2021, 02/10/2021    DT (pediatric) 07/19/2006    H1N1, All Formulations 10/23/2009    Influenza, recombinant, quadrivalent,injectable, preservative free 09/09/2020, 09/14/2021, 09/19/2022    Tdap 07/24/2019       Kenya Alvarez MD  [facility]

## 2022-09-19 NOTE — LETTER
Jj Raadalphonso      Current Outpatient Medications:     ALPRAZolam (Xanax) 0 25 mg tablet, Take 1 tablet (0 25 mg total) by mouth 3 (three) times a day as needed (for flying), Disp: 30 tablet, Rfl: 0    cyanocobalamin 1,000 mcg/mL, Inject 1 mL (1,000 mcg total) into a muscle every 30 (thirty) days, Disp: 10 mL, Rfl: 1    multivitamin (THERAGRAN) TABS, Take 1 tablet by mouth daily, Disp: , Rfl:     Syringe/Needle, Disp, (SYRINGE 3CC/23GX1") 23G X 1" 3 ML MISC, Use every 30 (thirty) days (Patient not taking: Reported on 8/4/2022), Disp: 6 each, Rfl: 1      Recent Results (from the past 672 hour(s))   CBC and differential    Collection Time: 08/23/22  8:38 AM   Result Value Ref Range    White Blood Cell Count 4 3 3 4 - 10 8 x10E3/uL    Red Blood Cell Count 4 93 3 77 - 5 28 x10E6/uL    Hemoglobin 14 9 11 1 - 15 9 g/dL    HCT 43 0 34 0 - 46 6 %    MCV 87 79 - 97 fL    MCH 30 2 26 6 - 33 0 pg    MCHC 34 7 31 5 - 35 7 g/dL    RDW 12 1 11 7 - 15 4 %    Platelet Count 883 877 - 450 x10E3/uL    Neutrophils 42 Not Estab  %    Lymphocytes 46 Not Estab  %    Monocytes 9 Not Estab  %    Eosinophils 2 Not Estab  %    Basophils PCT 1 Not Estab  %    Neutrophils (Absolute) 1 8 1 4 - 7 0 x10E3/uL    Lymphocytes (Absolute) 1 9 0 7 - 3 1 x10E3/uL    Monocytes (Absolute) 0 4 0 1 - 0 9 x10E3/uL    Eosinophils (Absolute) 0 1 0 0 - 0 4 x10E3/uL    Basophils ABS 0 1 0 0 - 0 2 x10E3/uL    Immature Granulocytes 0 Not Estab  %    Immature Granulocytes (Absolute) 0 0 0 0 - 0 1 x10E3/uL   Comprehensive metabolic panel    Collection Time: 08/23/22  8:38 AM   Result Value Ref Range    Glucose, Random 93 65 - 99 mg/dL    BUN 15 6 - 24 mg/dL    Creatinine 0 93 0 57 - 1 00 mg/dL    eGFR 72 >59 mL/min/1 73    SL AMB BUN/CREATININE RATIO 16 9 - 23    Sodium 143 134 - 144 mmol/L    Potassium 4 1 3 5 - 5 2 mmol/L    Chloride 103 96 - 106 mmol/L    CO2 27 20 - 29 mmol/L    CALCIUM 9 7 8 7 - 10 2 mg/dL    Protein, Total 6 8 6 0 - 8 5 g/dL    Albumin 4 8 3 8 - 4 9 g/dL    Globulin, Total 2 0 1 5 - 4 5 g/dL    Albumin/Globulin Ratio 2 4 (H) 1 2 - 2 2    TOTAL BILIRUBIN 0 3 0 0 - 1 2 mg/dL    Alk Phos Isoenzymes 59 44 - 121 IU/L    AST 22 0 - 40 IU/L    ALT 24 0 - 32 IU/L   Lipid panel    Collection Time: 08/23/22  8:38 AM   Result Value Ref Range    Cholesterol, Total 190 100 - 199 mg/dL    Triglycerides 87 0 - 149 mg/dL    HDL 68 >39 mg/dL    VLDL Cholesterol Calculated 16 5 - 40 mg/dL    LDL Calculated 106 (H) 0 - 99 mg/dL    T   Chol/HDL Ratio 2 8 0 0 - 4 4 ratio   TSH, 3rd generation    Collection Time: 08/23/22  8:38 AM   Result Value Ref Range    TSH 1 270 0 450 - 4 500 uIU/mL

## 2022-09-22 LAB
CYTOLOGIST CVX/VAG CYTO: NORMAL
DX ICD CODE: NORMAL
OTHER STN SPEC: NORMAL
PATH REPORT.FINAL DX SPEC: NORMAL
SL AMB NOTE:: NORMAL
SL AMB SPECIMEN ADEQUACY: NORMAL

## 2022-10-02 LAB — COLOGUARD RESULT REPORTABLE: NEGATIVE

## 2022-11-18 ENCOUNTER — VBI (OUTPATIENT)
Dept: ADMINISTRATIVE | Facility: OTHER | Age: 57
End: 2022-11-18

## 2022-11-18 DIAGNOSIS — E53.8 B12 DEFICIENCY: ICD-10-CM

## 2022-11-18 RX ORDER — CYANOCOBALAMIN 1000 UG/ML
1000 INJECTION, SOLUTION INTRAMUSCULAR; SUBCUTANEOUS
Qty: 3 ML | Refills: 6 | Status: SHIPPED | OUTPATIENT
Start: 2022-11-18

## 2022-11-18 NOTE — TELEPHONE ENCOUNTER
Requested medication(s) are due for refill today: Yes  Patient has already received a courtesy refill: No  Other reason request has been forwarded to provider:    Off-Protocol Failed 11/18/2022 02:00 AM   Protocol Details  Medication not assigned to a protocol, review manually

## 2022-12-28 ENCOUNTER — OFFICE VISIT (OUTPATIENT)
Dept: URGENT CARE | Facility: CLINIC | Age: 57
End: 2022-12-28

## 2022-12-28 ENCOUNTER — HOSPITAL ENCOUNTER (EMERGENCY)
Facility: HOSPITAL | Age: 57
Discharge: HOME | End: 2022-12-28
Attending: EMERGENCY MEDICINE

## 2022-12-28 ENCOUNTER — APPOINTMENT (EMERGENCY)
Dept: CT IMAGING | Facility: HOSPITAL | Age: 57
End: 2022-12-28

## 2022-12-28 ENCOUNTER — APPOINTMENT (EMERGENCY)
Dept: ULTRASOUND IMAGING | Facility: HOSPITAL | Age: 57
End: 2022-12-28

## 2022-12-28 VITALS
HEART RATE: 74 BPM | RESPIRATION RATE: 16 BRPM | OXYGEN SATURATION: 100 % | DIASTOLIC BLOOD PRESSURE: 74 MMHG | TEMPERATURE: 98.6 F | WEIGHT: 134 LBS | BODY MASS INDEX: 23.55 KG/M2 | SYSTOLIC BLOOD PRESSURE: 154 MMHG

## 2022-12-28 VITALS
TEMPERATURE: 97.6 F | HEIGHT: 64 IN | SYSTOLIC BLOOD PRESSURE: 139 MMHG | WEIGHT: 132.94 LBS | HEART RATE: 72 BPM | RESPIRATION RATE: 18 BRPM | BODY MASS INDEX: 22.7 KG/M2 | OXYGEN SATURATION: 99 % | DIASTOLIC BLOOD PRESSURE: 75 MMHG

## 2022-12-28 DIAGNOSIS — R10.13 EPIGASTRIC PAIN: Primary | ICD-10-CM

## 2022-12-28 DIAGNOSIS — R10.9 ABDOMINAL PAIN: Primary | ICD-10-CM

## 2022-12-28 LAB
ALBUMIN SERPL BCP-MCNC: 4.5 G/DL (ref 3.5–5)
ALP SERPL-CCNC: 50 U/L (ref 34–104)
ALT SERPL W P-5'-P-CCNC: 21 U/L (ref 7–52)
ANION GAP SERPL CALCULATED.3IONS-SCNC: 6 MMOL/L (ref 4–13)
AST SERPL W P-5'-P-CCNC: 20 U/L (ref 13–39)
BASOPHILS # BLD AUTO: 0.05 THOUSANDS/ÂΜL (ref 0–0.1)
BASOPHILS NFR BLD AUTO: 1 % (ref 0–1)
BILIRUB SERPL-MCNC: 0.41 MG/DL (ref 0.2–1)
BUN SERPL-MCNC: 11 MG/DL (ref 5–25)
CALCIUM SERPL-MCNC: 9.7 MG/DL (ref 8.4–10.2)
CARDIAC TROPONIN I PNL SERPL HS: <2 NG/L
CHLORIDE SERPL-SCNC: 104 MMOL/L (ref 96–108)
CO2 SERPL-SCNC: 32 MMOL/L (ref 21–32)
CREAT SERPL-MCNC: 0.79 MG/DL (ref 0.6–1.3)
EOSINOPHIL # BLD AUTO: 0.03 THOUSAND/ÂΜL (ref 0–0.61)
EOSINOPHIL NFR BLD AUTO: 1 % (ref 0–6)
ERYTHROCYTE [DISTWIDTH] IN BLOOD BY AUTOMATED COUNT: 12.2 % (ref 11.6–15.1)
FLUAV RNA RESP QL NAA+PROBE: NEGATIVE
FLUBV RNA RESP QL NAA+PROBE: NEGATIVE
GFR SERPL CREATININE-BSD FRML MDRD: 83 ML/MIN/1.73SQ M
GLUCOSE SERPL-MCNC: 97 MG/DL (ref 65–140)
HCT VFR BLD AUTO: 44.3 % (ref 34.8–46.1)
HGB BLD-MCNC: 14.8 G/DL (ref 11.5–15.4)
IMM GRANULOCYTES # BLD AUTO: 0.01 THOUSAND/UL (ref 0–0.2)
IMM GRANULOCYTES NFR BLD AUTO: 0 % (ref 0–2)
LIPASE SERPL-CCNC: 32 U/L (ref 11–82)
LYMPHOCYTES # BLD AUTO: 1.75 THOUSANDS/ÂΜL (ref 0.6–4.47)
LYMPHOCYTES NFR BLD AUTO: 34 % (ref 14–44)
MAGNESIUM SERPL-MCNC: 2 MG/DL (ref 1.9–2.7)
MCH RBC QN AUTO: 29.4 PG (ref 26.8–34.3)
MCHC RBC AUTO-ENTMCNC: 33.4 G/DL (ref 31.4–37.4)
MCV RBC AUTO: 88 FL (ref 82–98)
MONOCYTES # BLD AUTO: 0.32 THOUSAND/ÂΜL (ref 0.17–1.22)
MONOCYTES NFR BLD AUTO: 6 % (ref 4–12)
NEUTROPHILS # BLD AUTO: 3 THOUSANDS/ÂΜL (ref 1.85–7.62)
NEUTS SEG NFR BLD AUTO: 58 % (ref 43–75)
NRBC BLD AUTO-RTO: 0 /100 WBCS
PLATELET # BLD AUTO: 277 THOUSANDS/UL (ref 149–390)
PMV BLD AUTO: 9.9 FL (ref 8.9–12.7)
POTASSIUM SERPL-SCNC: 4.5 MMOL/L (ref 3.5–5.3)
PROT SERPL-MCNC: 7.2 G/DL (ref 6.4–8.4)
RBC # BLD AUTO: 5.03 MILLION/UL (ref 3.81–5.12)
RSV RNA RESP QL NAA+PROBE: NEGATIVE
SARS-COV-2 RNA RESP QL NAA+PROBE: NEGATIVE
SODIUM SERPL-SCNC: 142 MMOL/L (ref 135–147)
WBC # BLD AUTO: 5.16 THOUSAND/UL (ref 4.31–10.16)

## 2022-12-28 RX ORDER — PANTOPRAZOLE SODIUM 40 MG/1
40 TABLET, DELAYED RELEASE ORAL 2 TIMES DAILY
Qty: 60 TABLET | Refills: 0 | Status: SHIPPED | OUTPATIENT
Start: 2022-12-28

## 2022-12-28 RX ORDER — SUCRALFATE 1 G/1
1 TABLET ORAL 4 TIMES DAILY
Qty: 120 TABLET | Refills: 0 | Status: SHIPPED | OUTPATIENT
Start: 2022-12-28

## 2022-12-28 RX ORDER — ONDANSETRON 2 MG/ML
4 INJECTION INTRAMUSCULAR; INTRAVENOUS ONCE
Status: COMPLETED | OUTPATIENT
Start: 2022-12-28 | End: 2022-12-28

## 2022-12-28 RX ORDER — LIDOCAINE HYDROCHLORIDE 20 MG/ML
15 SOLUTION OROPHARYNGEAL ONCE
Status: COMPLETED | OUTPATIENT
Start: 2022-12-28 | End: 2022-12-28

## 2022-12-28 RX ORDER — MAGNESIUM HYDROXIDE/ALUMINUM HYDROXICE/SIMETHICONE 120; 1200; 1200 MG/30ML; MG/30ML; MG/30ML
30 SUSPENSION ORAL ONCE
Status: COMPLETED | OUTPATIENT
Start: 2022-12-28 | End: 2022-12-28

## 2022-12-28 RX ORDER — PANTOPRAZOLE SODIUM 40 MG/10ML
40 INJECTION, POWDER, LYOPHILIZED, FOR SOLUTION INTRAVENOUS ONCE
Status: COMPLETED | OUTPATIENT
Start: 2022-12-28 | End: 2022-12-28

## 2022-12-28 RX ADMIN — PANTOPRAZOLE SODIUM 40 MG: 40 INJECTION, POWDER, FOR SOLUTION INTRAVENOUS at 15:37

## 2022-12-28 RX ADMIN — LIDOCAINE HYDROCHLORIDE 15 ML: 20 SOLUTION ORAL; TOPICAL at 15:37

## 2022-12-28 RX ADMIN — SODIUM CHLORIDE, POTASSIUM CHLORIDE, SODIUM LACTATE AND CALCIUM CHLORIDE 1000 ML: 600; 310; 30; 20 INJECTION, SOLUTION INTRAVENOUS at 14:30

## 2022-12-28 RX ADMIN — IOHEXOL 100 ML: 350 INJECTION, SOLUTION INTRAVENOUS at 16:52

## 2022-12-28 RX ADMIN — ALUMINUM HYDROXIDE, MAGNESIUM HYDROXIDE, AND DIMETHICONE 30 ML: 200; 20; 200 SUSPENSION ORAL at 15:37

## 2022-12-28 RX ADMIN — ONDANSETRON 4 MG: 2 INJECTION INTRAMUSCULAR; INTRAVENOUS at 15:37

## 2022-12-28 NOTE — ED PROVIDER NOTES
History  Chief Complaint   Patient presents with   • Abdominal Pain     Patient c/o epigastric pain for about a week and a half  Denies fever, and N/V/D now  This is a 70-year-old female with a relevant past medical history of celiac disease, presenting to the ED today for a complaint of abdominal pain  Her abdominal pain is located in her right upper quadrant, radiates to her epigastrium  She complains of associated nausea without vomiting  She had 1 episode of diarrhea today  She denies any fever, chills, but did have an episode of diaphoresis while making a bowel movement yesterday  She has not had any chest pain, shortness of breath, focal weakness, focal numbness or tingling or any other significant related symptoms  Her pain is sharp in quality, moderate in intensity, and has been limiting her food intake  She states that approximately 15 to 30 minutes after eating anything she will start to have pain in her right upper quadrant  She states the only day over the past week and a half she has not felt this pain was on the 26th when she was consuming a good amount of alcohol  She has not noticed any melanotic stools  Prior to Admission Medications   Prescriptions Last Dose Informant Patient Reported? Taking?    ALPRAZolam (Xanax) 0 25 mg tablet   No No   Sig: Take 1 tablet (0 25 mg total) by mouth 3 (three) times a day as needed (for flying)   Syringe/Needle, Disp, (SYRINGE 3CC/23GX1") 23G X 1" 3 ML MISC   No No   Sig: Use every 30 (thirty) days   cyanocobalamin 1,000 mcg/mL   No No   Sig: INJECT 1 ML (1,000 MCG TOTAL) INTO A MUSCLE EVERY 30 (THIRTY) DAYS   multivitamin (THERAGRAN) TABS   Yes No   Sig: Take 1 tablet by mouth daily      Facility-Administered Medications: None       Past Medical History:   Diagnosis Date   • Celiac disease     last assessed: 9/27/2016   • Chicken pox     AS PER PT       Past Surgical History:   Procedure Laterality Date   • ROTATOR CUFF REPAIR  2017 Family History   Problem Relation Age of Onset   • No Known Problems Mother    • Cancer Father         bladder   • Dementia Father    • Depression Father    • Heart disease Family    • Diabetes Family    • Cancer Family    • Substance Abuse Neg Hx    • Mental illness Neg Hx      I have reviewed and agree with the history as documented  E-Cigarette/Vaping   • E-Cigarette Use Never User      E-Cigarette/Vaping Substances   • Nicotine No    • THC No    • CBD No    • Flavoring No    • Other No    • Unknown No      Social History     Tobacco Use   • Smoking status: Former   • Smokeless tobacco: Never   Vaping Use   • Vaping Use: Never used   Substance Use Topics   • Alcohol use: Yes     Comment: social   • Drug use: Never       Review of Systems   Constitutional: Negative for activity change, chills and fever  HENT: Negative for congestion and rhinorrhea  Eyes: Negative for photophobia and visual disturbance  Respiratory: Negative for cough, chest tightness and shortness of breath  Cardiovascular: Negative for chest pain and leg swelling  Gastrointestinal: Positive for abdominal pain, diarrhea and nausea  Negative for abdominal distention and vomiting  Genitourinary: Negative for dysuria and frequency  Musculoskeletal: Negative for back pain and neck stiffness  Skin: Negative for rash and wound  Neurological: Negative for dizziness and weakness  Psychiatric/Behavioral: Negative for agitation and suicidal ideas  Physical Exam  Physical Exam  Vitals and nursing note reviewed  Constitutional:       General: She is not in acute distress  Appearance: Normal appearance  She is normal weight  She is not ill-appearing or toxic-appearing  HENT:      Head: Normocephalic and atraumatic  Right Ear: External ear normal       Left Ear: External ear normal       Nose: Nose normal  No congestion or rhinorrhea        Mouth/Throat:      Mouth: Mucous membranes are moist       Pharynx: Oropharynx is clear  No oropharyngeal exudate  Eyes:      General: No scleral icterus  Conjunctiva/sclera: Conjunctivae normal    Cardiovascular:      Rate and Rhythm: Normal rate and regular rhythm  Pulses: Normal pulses  Heart sounds: Normal heart sounds  No murmur heard  No gallop  Pulmonary:      Effort: Pulmonary effort is normal  No respiratory distress  Breath sounds: Normal breath sounds  No stridor  No wheezing or rhonchi  Abdominal:      General: Abdomen is flat  Bowel sounds are normal  There is no distension  Palpations: Abdomen is soft  There is no mass  Tenderness: There is abdominal tenderness in the right upper quadrant and epigastric area  Positive signs include Nava's sign  Musculoskeletal:         General: No swelling or tenderness  Normal range of motion  Cervical back: Normal range of motion and neck supple  No tenderness  Right lower leg: No edema  Left lower leg: No edema  Skin:     General: Skin is warm and dry  Capillary Refill: Capillary refill takes less than 2 seconds  Coloration: Skin is not jaundiced  Findings: No bruising  Neurological:      General: No focal deficit present  Mental Status: She is alert and oriented to person, place, and time  Mental status is at baseline  Sensory: No sensory deficit  Motor: No weakness  Psychiatric:         Mood and Affect: Mood normal  Mood is not anxious or depressed  Behavior: Behavior normal          Thought Content:  Thought content normal          Judgment: Judgment normal          Vital Signs  ED Triage Vitals [12/28/22 1409]   Temperature Pulse Respirations Blood Pressure SpO2   97 6 °F (36 4 °C) 70 18 125/71 99 %      Temp Source Heart Rate Source Patient Position - Orthostatic VS BP Location FiO2 (%)   Oral Monitor -- Left arm --      Pain Score       5           Vitals:    12/28/22 1409   BP: 125/71   Pulse: 70         Visual Acuity      ED Medications  Medications - No data to display    Diagnostic Studies  Results Reviewed     None                 No orders to display              Procedures  Procedures         ED Course  ED Course as of 12/28/22 2350   Wed Dec 28, 2022   1648 No change with GI cocktail  Ordered CT of the abdomen pelvis  MDM  Number of Diagnoses or Management Options  Abdominal pain  Diagnosis management comments: This is a 14-year-old female presenting to the ED today for a complaint of abdominal pain  Her abdominal pain is located in her right upper quadrant/epigastric region  She complains of some associated nausea, without vomiting  She has had diarrhea as well  She states that her pain starts approximately 30 minutes after eating  Her physical exam is remarkable for right upper quadrant tenderness to palpation, as well as a positive Nava sign  Differential diagnosis includes: Acute cholecystitis versus choledocholithiasis versus PUD versus other  Patient underwent right upper quadrant ultrasound showing no evidence for acute cholecystitis, choledocholithiasis  She did however have gallbladder polyps, which likely were not contributing to her presentation  I did speak with her about follow-up as an outpatient  Patient had a CBC, metabolic panel, urinalysis, troponin all of which were unremarkable  She underwent a CT of the abdomen pelvis showing no significant abnormalities  She was given a GI cocktail, Protonix, with minimal improvement in her symptoms  Patient was felt to likely have PUD versus gastritis, and will follow-up as an outpatient with gastroenterology  Patient was given a prescription for Carafate before meals and at bedtime, as well as Protonix twice daily  She was given strict return precautions which she agreed to comply  She was discharged home in stable condition and felt safe going home        Disposition  Final diagnoses:   None     ED Disposition None      Follow-up Information    None         Patient's Medications   Discharge Prescriptions    No medications on file       No discharge procedures on file      PDMP Review       Value Time User    PDMP Reviewed  Yes 10/4/2021  3:06 PM Jailene Vasquez MD          ED Provider  Electronically Signed by           Codey King MD  12/28/22 [phone]

## 2022-12-28 NOTE — PROGRESS NOTES
[address] [facility]        NAME: Fanny Villanueva is a 62 y o  female  : 1965    MRN: [id number]  DATE: 2022  TIME: 1:13 PM    Assessment and Plan   Epigastric pain [R10 13]  1  Epigastric pain  Transfer to other facility        Recommend ED for imaging and full evaluation  Patient agreeable and  will drive her to [facility] ED now  Advised not to eat or drink anything before arriving  Patient Instructions       Follow up with PCP in 3-5 days  Proceed to  ER if symptoms worsen  Chief Complaint     Chief Complaint   Patient presents with   • Abdominal Pain     Since leslie has had upper abdominal pain after she eats  Worse today after she ate an egg  Yesterday had diarrhea after she ate  History of Present Illness       Abdominal Pain  This is a new problem  Episode onset: 1 5 wks ago  The onset quality is gradual  The problem occurs 2 to 4 times per day  The problem has been gradually worsening  The pain is located in the epigastric region, LUQ and RLQ  The pain is at a severity of 6/10  The pain is moderate  The quality of the pain is aching  The abdominal pain does not radiate  Associated symptoms include anorexia and diarrhea  Pertinent negatives include no arthralgias, belching, constipation, dysuria, fever, flatus, frequency, headaches, hematochezia, hematuria, melena, myalgias or vomiting  The pain is aggravated by eating, drinking alcohol and palpation  The pain is relieved by nothing  She has tried nothing for the symptoms  There is no history of gallstones  Review of Systems   Review of Systems   Constitutional: Negative for fever  Gastrointestinal: Positive for abdominal pain, anorexia and diarrhea  Negative for constipation, flatus, hematochezia, melena and vomiting  Genitourinary: Negative for dysuria, frequency and hematuria  Musculoskeletal: Negative for arthralgias and myalgias  Neurological: Negative for headaches           Current Medications       Current Outpatient Medications:   •  ALPRAZolam (Xanax) 0 25 mg tablet, Take 1 tablet (0 25 mg total) by mouth 3 (three) times a day as needed (for flying), Disp: 30 tablet, Rfl: 0  •  cyanocobalamin 1,000 mcg/mL, INJECT 1 ML (1,000 MCG TOTAL) INTO A MUSCLE EVERY 30 (THIRTY) DAYS, Disp: 3 mL, Rfl: 6  •  multivitamin (THERAGRAN) TABS, Take 1 tablet by mouth daily, Disp: , Rfl:   •  Syringe/Needle, Disp, (SYRINGE 3CC/23GX1") 23G X 1" 3 ML MISC, Use every 30 (thirty) days, Disp: 6 each, Rfl: 1    Current Allergies     Allergies as of 12/28/2022 - Reviewed 12/28/2022   Allergen Reaction Noted   • Barley grass - food allergy Vomiting, Dermatitis, Dizziness, GI Intolerance, Headache, Itching, Lightheadedness, and Diarrhea 07/24/2019   • Oat - food allergy Dermatitis, GI Intolerance, Headache, and Irritability 09/09/2020   • Wheat bran - food allergy Diarrhea, Dermatitis, GI Intolerance, Vomiting, Irritability, Anxiety, and Headache 09/27/2016            The following portions of the patient's history were reviewed and updated as appropriate: allergies, current medications, past family history, past medical history, past social history, past surgical history and problem list      Past Medical History:   Diagnosis Date   • Celiac disease     last assessed: 9/27/2016   • Chicken pox     AS PER PT       Past Surgical History:   Procedure Laterality Date   • ROTATOR CUFF REPAIR  2017       Family History   Problem Relation Age of Onset   • No Known Problems Mother    • Cancer Father         bladder   • Dementia Father    • Depression Father    • Heart disease Family    • Diabetes Family    • Cancer Family    • Substance Abuse Neg Hx    • Mental illness Neg Hx          Medications have been verified  Objective   /74   Pulse 74   Temp 98 6 °F (37 °C)   Resp 16   Wt 60 8 kg (134 lb)   SpO2 100%   BMI 23 55 kg/m²        Physical Exam     Physical Exam  Vitals and nursing note reviewed  Constitutional:       General: She is not in acute distress  Appearance: Normal appearance  She is not ill-appearing  HENT:      Head: Normocephalic and atraumatic  Cardiovascular:      Rate and Rhythm: Normal rate  Pulmonary:      Effort: Pulmonary effort is normal  No respiratory distress  Abdominal:      General: Bowel sounds are normal       Tenderness: There is abdominal tenderness in the epigastric area and left upper quadrant  There is no guarding or rebound  Negative signs include Nava's sign and Rovsing's sign  Skin:     General: Skin is warm and dry  Capillary Refill: Capillary refill takes less than 2 seconds  Neurological:      Mental Status: She is alert and oriented to person, place, and time     Psychiatric:         Behavior: Behavior normal

## 2023-01-13 ENCOUNTER — CONSULT (OUTPATIENT)
Dept: GASTROENTEROLOGY | Facility: CLINIC | Age: 58
End: 2023-01-13

## 2023-01-13 VITALS
TEMPERATURE: 97.7 F | DIASTOLIC BLOOD PRESSURE: 88 MMHG | BODY MASS INDEX: 22.2 KG/M2 | WEIGHT: 130 LBS | HEART RATE: 71 BPM | SYSTOLIC BLOOD PRESSURE: 126 MMHG | HEIGHT: 64 IN

## 2023-01-13 DIAGNOSIS — Z12.11 COLON CANCER SCREENING: ICD-10-CM

## 2023-01-13 DIAGNOSIS — R10.9 ABDOMINAL PAIN: ICD-10-CM

## 2023-01-13 DIAGNOSIS — R59.9 LYMPH NODE ENLARGEMENT: Primary | ICD-10-CM

## 2023-01-13 DIAGNOSIS — K90.0 CELIAC DISEASE: ICD-10-CM

## 2023-01-13 NOTE — PROGRESS NOTES
Consultation - [facility]  Miladys Shipman 62 y o  female MRN: [id number]  Unit/Bed#:  Encounter: [id number]        Consults    ASSESSMENT/PLAN:     1   Gallbladder polyp-no further follow-up needed  2   Constipation alternating with diarrhea-carries a diagnosis of celiac disease since 2003  Has been on gluten-free diet since then however may have had some accidental ingestion during the holidays  She is currently on a strict gluten-free diet again  Would recommend checking celiac serologies at this time  Would also recommend biopsies of the small bowel at the time of endoscopy  -She reports that her children have been tested and have been negative for celiac disease   -Would recommend checking B12, vitamin D, B12 and iron studies  -DEXA scan is up-to-date  3   Epigastric abdominal pain-likely secondary to peptic ulcer disease or gastritis in setting of NSAID use   -Would recommend pantoprazole 40 mg twice daily  -May decrease Carafate to 1 g twice daily, 2 are separate from other medications   -Continue to avoid NSAIDs   -Follow nonulcerogenic/gluten-free diet   -If endoscopy is unremarkable and patient continues to have ongoing abdominal pain, may benefit from referral to surgery given presence of gallbladder polyps although no obvious gallstones noted on ultrasound  4   Periaortic lymphadenopathy-unclear etiology, recommend repeat CAT scan at 3-month interval     5   Follow-up after the CAT scan    6  Colon cancer xeqepktzq-rn-gy-date, no family history of colon cancer  No change in bowel habits or hematochezia    Patient underwent Cologuard testing within the past year therefore would recommend repeat screening at 3-year interval       ______________________________________________________________________    Reason for Consult / Principal Problem: [unfilled]    HPI: Miladys Shipman is a 62y o  year old female with history of celiac disease, presents for evaluation of epigastric abdominal pain  She reports that she had acute onset of epigastric abdominal pain starting in the right  upper quadrant and ultimately in the epigastric region  Patient reports that the pain is postprandial, typically was occurring 15 to 30 minutes after eating  She also reports occasional use of NSAIDs  Did not report any melena or hematochezia  He also reports that she may have accidentally ingested gluten during the holidays when she was eating outside of her house and reports that she did have a several weeks of irregular bowel habits as well which alternated between loose stools and constipation  She denies any hematemesis, coffee and emesis or melena however  CT of abdomen and pelvis with contrast the emergency room did not show any acute pelvic pathology, no evidence of pancreatitis  She did have nonspecific periaortic lymph node that measured 9 mm  Upper quadrant ultrasound was notable for gallbladder polyps that measured between 2 to 3 mm  No intrahepatic biliary ductal dilatation  No evidence of choledocholithiasis  She was prescribed pantoprazole 40 mg twice daily and Carafate 1 g 4 times daily  Patient reports that Carafate 1 g 4 times daily has resulted in constipation  She reports that her symptoms have improved significantly but not fully resolved  Still has some epigastric discomfort  No prior history of pancreatitis  She reports that she was diagnosed with celiac disease in 2003, reports that this was biopsy-proven  She has not had any EGD since then  She has not had any celiac serologies checked  She did have a DEXA scan in 2020  He does also report taking an daily multivitamin  Review of Systems: The remainder of the review of systems was negative except for the pertinent positives noted in HPI       Historical Information   Past Medical History:   Diagnosis Date   • Anemia 2003   • Celiac disease     last assessed: 9/27/2016   • Chicken pox     AS PER PT     Past Surgical History:   Procedure Laterality Date   • ROTATOR CUFF REPAIR  2017     Social History   Social History     Substance and Sexual Activity   Alcohol Use Yes    Comment: social     Social History     Substance and Sexual Activity   Drug Use Not Currently     Social History     Tobacco Use   Smoking Status Former   Smokeless Tobacco Never     Family History   Problem Relation Age of Onset   • Heart disease Mother         Pacemaker and defibrillator CHF   • Hypertension Mother    • Cancer Father         Bladder and kidney   • Dementia Father    • Depression Father             • Heart disease Father         Defibrillator  Quadruple bypass at 58 yrs   • Stroke Father    • Heart disease Family    • Diabetes Family    • Cancer Family    • Substance Abuse Neg Hx    • Mental illness Neg Hx        Meds/Allergies     (Not in a hospital admission)    No current facility-administered medications for this visit  Allergies   Allergen Reactions   • Barley Grass - Food Allergy Vomiting, Dermatitis, Dizziness, GI Intolerance, Headache, Itching, Lightheadedness and Diarrhea   • Oat - Food Allergy Dermatitis, GI Intolerance, Headache and Irritability   • Wheat Bran - Food Allergy Diarrhea, Dermatitis, GI Intolerance, Vomiting, Irritability, Anxiety and Headache     All wheat products       Objective     Blood pressure 126/88, pulse 71, temperature 97 7 °F (36 5 °C), temperature source Temporal, height 5' 4" (1 626 m), weight 59 kg (130 lb)  [unfilled]    PHYSICAL EXAM     GEN: well nourished, well developed, no acute distress  HEENT: anicteric, MMM, no cervical or supraclavicular lymphadenopathy  CV: RRR, no m/r/g  CHEST: CTA b/l, no WRR  ABD: +BS, soft, NT/ND, no hepatosplenomegaly  EXT: no c/c/e  SKIN: no rashes,  NEURO: aaox3    Lab Results:   No visits with results within 1 Day(s) from this visit     Latest known visit with results is:   Admission on 2022, Discharged on 12/28/2022   Component Date Value   • WBC 12/28/2022 5 16    • RBC 12/28/2022 5 03    • Hemoglobin 12/28/2022 14 8    • Hematocrit 12/28/2022 44 3    • MCV 12/28/2022 88    • MCH 12/28/2022 29 4    • MCHC 12/28/2022 33 4    • RDW 12/28/2022 12 2    • MPV 12/28/2022 9 9    • Platelets 97/53/4431 277    • nRBC 12/28/2022 0    • Neutrophils Relative 12/28/2022 58    • Immat GRANS % 12/28/2022 0    • Lymphocytes Relative 12/28/2022 34    • Monocytes Relative 12/28/2022 6    • Eosinophils Relative 12/28/2022 1    • Basophils Relative 12/28/2022 1    • Neutrophils Absolute 12/28/2022 3 00    • Immature Grans Absolute 12/28/2022 0 01    • Lymphocytes Absolute 12/28/2022 1 75    • Monocytes Absolute 12/28/2022 0 32    • Eosinophils Absolute 12/28/2022 0 03    • Basophils Absolute 12/28/2022 0 05    • Sodium 12/28/2022 142    • Potassium 12/28/2022 4 5    • Chloride 12/28/2022 104    • CO2 12/28/2022 32    • ANION GAP 12/28/2022 6    • BUN 12/28/2022 11    • Creatinine 12/28/2022 0 79    • Glucose 12/28/2022 97    • Calcium 12/28/2022 9 7    • AST 12/28/2022 20    • ALT 12/28/2022 21    • Alkaline Phosphatase 12/28/2022 50    • Total Protein 12/28/2022 7 2    • Albumin 12/28/2022 4 5    • Total Bilirubin 12/28/2022 0 41    • eGFR 12/28/2022 83    • Lipase 12/28/2022 32    • Magnesium 12/28/2022 2 0    • hs TnI 0hr 12/28/2022 <2    • SARS-CoV-2 12/28/2022 Negative    • INFLUENZA A PCR 12/28/2022 Negative    • INFLUENZA B PCR 12/28/2022 Negative    • RSV PCR 12/28/2022 Negative      Imaging Studies: I have personally reviewed pertinent films in PACS                Answers for HPI/ROS submitted by the patient on 1/12/2023  Chronicity: recurrent  Onset: more than 1 month ago  Onset quality: sudden  Frequency: daily  Episode duration: 3 Weeks  Progression since onset: waxing and waning  Pain location: LUQ, RUQ  Pain - numeric: 1/10  Pain quality: aching, dull, sharp  Radiates to: does not radiate  anorexia: Yes  arthralgias: No  belching: No  constipation: Yes  diarrhea: Yes  dysuria: No  fever: No  flatus: No  frequency: No  headaches: No  hematochezia: No  hematuria: No  melena: No  myalgias: No  nausea: Yes  weight loss: No  vomiting: No  Aggravated by: being still, certain positions  Relieved by: nothing  Diagnostic workup: ultrasound

## 2023-01-13 NOTE — PATIENT INSTRUCTIONS
Scheduled date of EGD(as of today): 03/29/23  Physician performing EGD: [facility]  [facility]  Instructions reviewed with patient by: VIRGINIA  Clearances: MACK

## 2023-01-13 NOTE — LETTER
January 14, 2023     [place] [id number]    Patient: Dameon Plama   YOB: 1965   Date of Visit: 1/13/2023       Dear Dr Aleksander Wagner: Thank you for referring Dameon Palma to me for evaluation  Below are my notes for this consultation  If you have questions, please do not hesitate to call me  I look forward to following your patient along with you  Sincerely,        Ismael Carrasco MD        CC: MD Ismael Smyth MD  1/14/2023 11:53 AM  Sign when Signing Visit  Consultation - [facility]  Dameon Palma 62 y o  female MRN: [id number]  Unit/Bed#:  Encounter: [id number]        Consults    ASSESSMENT/PLAN:     1   Gallbladder polyp-no further follow-up needed  2   Constipation alternating with diarrhea-carries a diagnosis of celiac disease since 2003  Has been on gluten-free diet since then however may have had some accidental ingestion during the holidays  She is currently on a strict gluten-free diet again  Would recommend checking celiac serologies at this time  Would also recommend biopsies of the small bowel at the time of endoscopy  -She reports that her children have been tested and have been negative for celiac disease   -Would recommend checking B12, vitamin D, B12 and iron studies  -DEXA scan is up-to-date  3   Epigastric abdominal pain-likely secondary to peptic ulcer disease or gastritis in setting of NSAID use   -Would recommend pantoprazole 40 mg twice daily  -May decrease Carafate to 1 g twice daily, 2 are separate from other medications   -Continue to avoid NSAIDs   -Follow nonulcerogenic/gluten-free diet   -If endoscopy is unremarkable and patient continues to have ongoing abdominal pain, may benefit from referral to surgery given presence of gallbladder polyps although no obvious gallstones noted on ultrasound      4   Periaortic lymphadenopathy-unclear etiology, recommend repeat CAT scan at 3-month interval     5   Follow-up after the CAT scan    6  Colon cancer qxmfqeoah-[id number]-rr-date, no family history of colon cancer  No change in bowel habits or hematochezia  Patient underwent Cologuard testing within the past year therefore would recommend repeat screening at 3-year interval       ______________________________________________________________________    Reason for Consult / Principal Problem: [unfilled]    HPI: Shannan Voss is a 62y o  year old female with history of celiac disease, presents for evaluation of epigastric abdominal pain  She reports that she had acute onset of epigastric abdominal pain starting in the right  upper quadrant and ultimately in the epigastric region  Patient reports that the pain is postprandial, typically was occurring 15 to 30 minutes after eating  She also reports occasional use of NSAIDs  Did not report any melena or hematochezia  He also reports that she may have accidentally ingested gluten during the holidays when she was eating outside of her house and reports that she did have a several weeks of irregular bowel habits as well which alternated between loose stools and constipation  She denies any hematemesis, coffee and emesis or melena however  CT of abdomen and pelvis with contrast the emergency room did not show any acute pelvic pathology, no evidence of pancreatitis  She did have nonspecific periaortic lymph node that measured 9 mm  Upper quadrant ultrasound was notable for gallbladder polyps that measured between 2 to 3 mm  No intrahepatic biliary ductal dilatation  No evidence of choledocholithiasis  She was prescribed pantoprazole 40 mg twice daily and Carafate 1 g 4 times daily  Patient reports that Carafate 1 g 4 times daily has resulted in constipation  She reports that her symptoms have improved significantly but not fully resolved  Still has some epigastric discomfort      No prior history of pancreatitis  She reports that she was diagnosed with celiac disease in , reports that this was biopsy-proven  She has not had any EGD since then  She has not had any celiac serologies checked  She did have a DEXA scan in   He does also report taking an daily multivitamin  Review of Systems: The remainder of the review of systems was negative except for the pertinent positives noted in HPI  Historical Information   Past Medical History:   Diagnosis Date   • Anemia    • Celiac disease     last assessed: 2016   • Chicken pox     AS PER PT     Past Surgical History:   Procedure Laterality Date   • ROTATOR CUFF REPAIR       Social History   Social History     Substance and Sexual Activity   Alcohol Use Yes    Comment: social     Social History     Substance and Sexual Activity   Drug Use Not Currently     Social History     Tobacco Use   Smoking Status Former   Smokeless Tobacco Never     Family History   Problem Relation Age of Onset   • Heart disease Mother         Pacemaker and defibrillator CHF   • Hypertension Mother    • Cancer Father         Bladder and kidney   • Dementia Father    • Depression Father             • Heart disease Father         Defibrillator  Quadruple bypass at 58 yrs   • Stroke Father    • Heart disease Family    • Diabetes Family    • Cancer Family    • Substance Abuse Neg Hx    • Mental illness Neg Hx        Meds/Allergies      (Not in a hospital admission)    No current facility-administered medications for this visit         Allergies   Allergen Reactions   • Barley Grass - Food Allergy Vomiting, Dermatitis, Dizziness, GI Intolerance, Headache, Itching, Lightheadedness and Diarrhea   • Oat - Food Allergy Dermatitis, GI Intolerance, Headache and Irritability   • Wheat Bran - Food Allergy Diarrhea, Dermatitis, GI Intolerance, Vomiting, Irritability, Anxiety and Headache     All wheat products       Objective      Blood pressure 126/88, pulse 71, temperature 97 7 °F (36 5 °C), temperature source Temporal, height 5' 4" (1 626 m), weight 59 kg (130 lb)  [unfilled]    PHYSICAL EXAM     GEN: well nourished, well developed, no acute distress  HEENT: anicteric, MMM, no cervical or supraclavicular lymphadenopathy  CV: RRR, no m/r/g  CHEST: CTA b/l, no WRR  ABD: +BS, soft, NT/ND, no hepatosplenomegaly  EXT: no c/c/e  SKIN: no rashes,  NEURO: aaox3    Lab Results:   No visits with results within 1 Day(s) from this visit     Latest known visit with results is:   Admission on 12/28/2022, Discharged on 12/28/2022   Component Date Value   • WBC 12/28/2022 5 16    • RBC 12/28/2022 5 03    • Hemoglobin 12/28/2022 14 8    • Hematocrit 12/28/2022 44 3    • MCV 12/28/2022 88    • MCH 12/28/2022 29 4    • MCHC 12/28/2022 33 4    • RDW 12/28/2022 12 2    • MPV 12/28/2022 9 9    • Platelets 75/21/4889 277    • nRBC 12/28/2022 0    • Neutrophils Relative 12/28/2022 58    • Immat GRANS % 12/28/2022 0    • Lymphocytes Relative 12/28/2022 34    • Monocytes Relative 12/28/2022 6    • Eosinophils Relative 12/28/2022 1    • Basophils Relative 12/28/2022 1    • Neutrophils Absolute 12/28/2022 3 00    • Immature Grans Absolute 12/28/2022 0 01    • Lymphocytes Absolute 12/28/2022 1 75    • Monocytes Absolute 12/28/2022 0 32    • Eosinophils Absolute 12/28/2022 0 03    • Basophils Absolute 12/28/2022 0 05    • Sodium 12/28/2022 142    • Potassium 12/28/2022 4 5    • Chloride 12/28/2022 104    • CO2 12/28/2022 32    • ANION GAP 12/28/2022 6    • BUN 12/28/2022 11    • Creatinine 12/28/2022 0 79    • Glucose 12/28/2022 97    • Calcium 12/28/2022 9 7    • AST 12/28/2022 20    • ALT 12/28/2022 21    • Alkaline Phosphatase 12/28/2022 50    • Total Protein 12/28/2022 7 2    • Albumin 12/28/2022 4 5    • Total Bilirubin 12/28/2022 0 41    • eGFR 12/28/2022 83    • Lipase 12/28/2022 32    • Magnesium 12/28/2022 2 0    • hs TnI 0hr 12/28/2022 <2    • SARS-CoV-2 12/28/2022 Negative    • INFLUENZA A PCR 12/28/2022 Negative    • INFLUENZA B PCR 12/28/2022 Negative    • RSV PCR 12/28/2022 Negative      Imaging Studies: I have personally reviewed pertinent films in PACS                Answers for HPI/ROS submitted by the patient on 1/12/2023  Chronicity: recurrent  Onset: more than 1 month ago  Onset quality: sudden  Frequency: daily  Episode duration: 3 Weeks  Progression since onset: waxing and waning  Pain location: LUQ, RUQ  Pain - numeric: 1/10  Pain quality: aching, dull, sharp  Radiates to: does not radiate  anorexia: Yes  arthralgias: No  belching: No  constipation: Yes  diarrhea: Yes  dysuria: No  fever: No  flatus: No  frequency: No  headaches: No  hematochezia: No  hematuria: No  melena: No  myalgias: No  nausea: Yes  weight loss: No  vomiting: No  Aggravated by: being still, certain positions  Relieved by: nothing  Diagnostic workup: ultrasound

## 2023-01-14 PROBLEM — R59.9 LYMPH NODE ENLARGEMENT: Status: ACTIVE | Noted: 2023-01-14

## 2023-01-14 PROBLEM — R10.9 ABDOMINAL PAIN: Status: ACTIVE | Noted: 2023-01-14

## 2023-01-17 LAB
25(OH)D3+25(OH)D2 SERPL-MCNC: 58.9 NG/ML (ref 30–100)
BUN SERPL-MCNC: 11 MG/DL (ref 6–24)
BUN/CREAT SERPL: 14 (ref 9–23)
CALCIUM SERPL-MCNC: 9.5 MG/DL (ref 8.7–10.2)
CHLORIDE SERPL-SCNC: 103 MMOL/L (ref 96–106)
CO2 SERPL-SCNC: 30 MMOL/L (ref 20–29)
CREAT SERPL-MCNC: 0.76 MG/DL (ref 0.57–1)
EGFR: 91 ML/MIN/1.73
ENDOMYSIUM IGA SER QL: NEGATIVE
GLIADIN PEPTIDE IGA SER-ACNC: 10 UNITS (ref 0–19)
GLIADIN PEPTIDE IGG SER-ACNC: 4 UNITS (ref 0–19)
GLUCOSE SERPL-MCNC: 88 MG/DL (ref 70–99)
IGA SERPL-MCNC: 231 MG/DL (ref 87–352)
IRON SATN MFR SERPL: 57 % (ref 15–55)
IRON SERPL-MCNC: 123 UG/DL (ref 27–159)
POTASSIUM SERPL-SCNC: 3.9 MMOL/L (ref 3.5–5.2)
SODIUM SERPL-SCNC: 144 MMOL/L (ref 134–144)
TIBC SERPL-MCNC: 214 UG/DL (ref 250–450)
TTG IGA SER-ACNC: <2 U/ML (ref 0–3)
TTG IGG SER-ACNC: 3 U/ML (ref 0–5)
UIBC SERPL-MCNC: 91 UG/DL (ref 131–425)
VIT B12 SERPL-MCNC: 1021 PG/ML (ref 232–1245)

## 2023-01-25 ENCOUNTER — HOSPITAL ENCOUNTER (OUTPATIENT)
Dept: CT IMAGING | Facility: HOSPITAL | Age: 58
Discharge: HOME | End: 2023-01-25
Attending: INTERNAL MEDICINE

## 2023-01-25 DIAGNOSIS — R59.9 LYMPH NODE ENLARGEMENT: ICD-10-CM

## 2023-01-25 RX ADMIN — IOHEXOL 100 ML: 350 INJECTION, SOLUTION INTRAVENOUS at 17:21

## 2023-01-31 ENCOUNTER — TELEPHONE (OUTPATIENT)
Dept: OTHER | Facility: OTHER | Age: 58
End: 2023-01-31

## 2023-01-31 NOTE — TELEPHONE ENCOUNTER
Called and relayed to patient the results of the CT scan have not been read once they are read by the provider our office will contact the go over     Patient verbalized understanding

## 2023-02-03 ENCOUNTER — OFFICE VISIT (OUTPATIENT)
Dept: OBGYN CLINIC | Facility: CLINIC | Age: 58
End: 2023-02-03

## 2023-02-03 VITALS
BODY MASS INDEX: 22.88 KG/M2 | SYSTOLIC BLOOD PRESSURE: 124 MMHG | WEIGHT: 134 LBS | DIASTOLIC BLOOD PRESSURE: 62 MMHG | HEIGHT: 64 IN

## 2023-02-03 DIAGNOSIS — D25.9 UTERINE LEIOMYOMA, UNSPECIFIED LOCATION: Primary | ICD-10-CM

## 2023-02-03 NOTE — PROGRESS NOTES
Assessment Sherie Purcell was seen today for pelvic pain  Diagnoses and all orders for this visit:    Uterine leiomyoma, unspecified location  -     US pelvis complete w transvaginal; Future         Plan   Suspect degenerating fibroid is the etiology of her pain  US ordered to further evaluate  Plan on expectant management at this time, discussed that myomectomy or hysterectomy is not necessarily indicated and patient agrees  Will follow up as needed, discussed results management via [address]  Subjective     Nelsyan Finder is a 62 y o  female here for a problem visit  Patient is complaining of abdominal pain  Started in December and was seen in the ED  Underwent CT scan that showed an enlarged para-aortic lymph node and a fibroid uterus  She describes the pain as lower abdominal and cramping as if a period is coming  The pain is at least twice per week and doesn't last the entire day  Not associated with nausea, vomiting, constipation, diarrhea that she can tell but she does have Celiac disease  This feels different than a celiac flare  Denies rectal bleeding, dyspareunia  LMP 2021  Pap 2022 NILM  Had an endometrial ablation about 20 years ago  Patient Active Problem List   Diagnosis   • Neoplasm of uncertain behavior of bone   • Colon cancer screening   • Encounter for screening mammogram for breast cancer   • Screening for hypothyroidism   • Screening for hyperlipidemia   • Screening for hypertension   • Routine general medical examination at a health care facility   • Celiac disease   • Need for influenza vaccination   • Lymph node enlargement   • Abdominal pain         Gynecologic History  Patient's last menstrual period was 2021 (approximate)        Obstetric History  OB History    Para Term  AB Living   3 3 3     3   SAB IAB Ectopic Multiple Live Births           3      # Outcome Date GA Lbr El/2nd Weight Sex Delivery Anes PTL Lv   3 Term Vag-Spont   TANG   2 Term      Vag-Spont   TANG   1 Term      Vag-Spont   TANG       Past Medical/Surgical/Family/Social History  The following portions of the patient's history were reviewed and updated as appropriate: allergies, current medications, past family history, past medical history, past social history, past surgical history and problem list     Allergies  Barley grass - food allergy, Oat - food allergy, and Wheat bran - food allergy    Medications    Current Outpatient Medications:   •  ALPRAZolam (Xanax) 0 25 mg tablet, Take 1 tablet (0 25 mg total) by mouth 3 (three) times a day as needed (for flying), Disp: 30 tablet, Rfl: 0  •  cyanocobalamin 1,000 mcg/mL, INJECT 1 ML (1,000 MCG TOTAL) INTO A MUSCLE EVERY 30 (THIRTY) DAYS, Disp: 3 mL, Rfl: 6  •  multivitamin (THERAGRAN) TABS, Take 1 tablet by mouth daily, Disp: , Rfl:   •  sucralfate (CARAFATE) 1 g tablet, Take 1 tablet (1 g total) by mouth 4 (four) times a day, Disp: 120 tablet, Rfl: 0  •  Syringe/Needle, Disp, (SYRINGE 3CC/23GX1") 23G X 1" 3 ML MISC, Use every 30 (thirty) days (Patient not taking: Reported on 1/13/2023), Disp: 6 each, Rfl: 1      Review of Systems  Constitutional: no fever, feels well, no tiredness, no recent weight gain or loss  Breasts:no complaints of breast pain, breast lump, or nipple discharge  Gastrointestinal: no complaints of abdominal pain, constipation, nausea, vomiting, or diarrhea or bloody stools  Genitourinary : no complaints of dysuria, incontinence, pelvic pain, dysmenorrhea,vaginal discharge or abnormal vaginal bleeding       Objective     /62 (BP Location: Right arm, Patient Position: Sitting, Cuff Size: Standard)   Ht 5' 4" (1 626 m)   Wt 60 8 kg (134 lb)   LMP 08/13/2021 (Approximate)   BMI 23 00 kg/m²     General: alert and oriented, in no acute distress  Vulva: normal, Bartholin's, Urethra, Markesan's normal  Vagina: normal mucosa  Cervix:  no cervical motion tenderness  Uterus:  bulky, mobile, retroverted, posterior fibroid palpable particularly on the left that is somewhat tender to palpation  Adnexa: normal adnexa and no mass, fullness, tenderness    Imaging  CT abdomen/pelvis reviewed

## 2023-02-12 DIAGNOSIS — E53.8 B12 DEFICIENCY: ICD-10-CM

## 2023-02-13 RX ORDER — NEEDLES, FILTER 19GX1 1/2"
NEEDLE, DISPOSABLE MISCELLANEOUS
Qty: 3 EACH | Refills: 3 | Status: SHIPPED | OUTPATIENT
Start: 2023-02-13

## 2023-02-15 ENCOUNTER — HOSPITAL ENCOUNTER (OUTPATIENT)
Dept: ULTRASOUND IMAGING | Facility: HOSPITAL | Age: 58
Discharge: HOME | End: 2023-02-15
Attending: OBSTETRICS & GYNECOLOGY

## 2023-02-15 DIAGNOSIS — D25.9 UTERINE LEIOMYOMA, UNSPECIFIED LOCATION: ICD-10-CM

## 2023-03-02 ENCOUNTER — OFFICE VISIT (OUTPATIENT)
Dept: FAMILY MEDICINE CLINIC | Facility: CLINIC | Age: 58
End: 2023-03-02

## 2023-03-02 VITALS
OXYGEN SATURATION: 98 % | RESPIRATION RATE: 12 BRPM | TEMPERATURE: 97.6 F | HEIGHT: 64 IN | WEIGHT: 135 LBS | DIASTOLIC BLOOD PRESSURE: 70 MMHG | SYSTOLIC BLOOD PRESSURE: 110 MMHG | HEART RATE: 73 BPM | BODY MASS INDEX: 23.05 KG/M2

## 2023-03-02 DIAGNOSIS — D21.9 FIBROID: ICD-10-CM

## 2023-03-02 DIAGNOSIS — J02.9 PHARYNGITIS, UNSPECIFIED ETIOLOGY: Primary | ICD-10-CM

## 2023-03-02 DIAGNOSIS — N93.9 VAGINAL BLEEDING: ICD-10-CM

## 2023-03-02 LAB — S PYO AG THROAT QL: NEGATIVE

## 2023-03-02 NOTE — PATIENT INSTRUCTIONS
PLENTY FLUIDS  REST  MUCINEX  MEDICATION AS DIRECTED  IF SYMPTOMS PERSIST OR WORSEN, PLEASE CALL]    CONSIDER GYN SECOND OPINION

## 2023-03-03 NOTE — PROGRESS NOTES
Name: Bj Vigil      : 1965      MRN: [id number]  Encounter Provider: Kevin Van MD  Encounter Date: 3/2/2023   Encounter department: [address] [place] [address]     1  Pharyngitis, unspecified etiology  -     POCT rapid strepA    2  Fibroid    3  Vaginal bleeding         Subjective      Sore Throat   This is a new problem  The problem has been gradually improving  There has been no fever  The pain is mild  Associated symptoms include congestion  Pertinent negatives include no abdominal pain, coughing, diarrhea, drooling, ear discharge, ear pain, headaches, hoarse voice, plugged ear sensation, neck pain, shortness of breath, stridor, swollen glands, trouble swallowing or vomiting  Review of Systems   Constitutional: Negative for fever  HENT: Positive for congestion and sore throat  Negative for drooling, ear discharge, ear pain, hoarse voice and trouble swallowing  Eyes: Negative for discharge  Respiratory: Negative for cough, chest tightness, shortness of breath and stridor  Cardiovascular: Negative for chest pain and palpitations  Gastrointestinal: Negative for abdominal pain, diarrhea, nausea and vomiting  Musculoskeletal: Negative for arthralgias, joint swelling and neck pain  Neurological: Negative for numbness and headaches  Psychiatric/Behavioral: The patient is not nervous/anxious          Current Outpatient Medications on File Prior to Visit   Medication Sig   • ALPRAZolam (Xanax) 0 25 mg tablet Take 1 tablet (0 25 mg total) by mouth 3 (three) times a day as needed (for flying)   • B-D INTEGRA SYRINGE 23G X 1" 3 ML MISC USE EVERY 30 (THIRTY) DAYS   • cyanocobalamin 1,000 mcg/mL INJECT 1 ML (1,000 MCG TOTAL) INTO A MUSCLE EVERY 30 (THIRTY) DAYS   • multivitamin (THERAGRAN) TABS Take 1 tablet by mouth daily       Objective     /70   Pulse 73   Temp 97 6 °F (36 4 °C) (Temporal)   Resp 12   Ht 5' 4" (1 626 m)   Wt 61 2 kg (135 lb)   LMP 08/13/2021 (Approximate)   SpO2 98%   BMI 23 17 kg/m²     Physical Exam  Constitutional:       Appearance: She is well-developed  HENT:      Head: Normocephalic and atraumatic  Right Ear: Ear canal and external ear normal  A middle ear effusion is present  Left Ear: Ear canal and external ear normal  A middle ear effusion is present  Nose: Mucosal edema and rhinorrhea present  Mouth/Throat:      Pharynx: Uvula midline  Posterior oropharyngeal erythema present  No oropharyngeal exudate  Eyes:      General:         Right eye: No discharge  Left eye: No discharge  Pupils: Pupils are equal, round, and reactive to light  Cardiovascular:      Rate and Rhythm: Normal rate and regular rhythm  Heart sounds: Normal heart sounds  No murmur heard  Pulmonary:      Effort: Pulmonary effort is normal  No respiratory distress  Breath sounds: No wheezing or rales  Abdominal:      General: Bowel sounds are normal       Palpations: Abdomen is soft  Tenderness: There is no abdominal tenderness  There is no rebound  Musculoskeletal:      Cervical back: Normal range of motion and neck supple  Lymphadenopathy:      Cervical: No cervical adenopathy  Skin:     General: Skin is warm and dry  Findings: No rash  Neurological:      Mental Status: She is alert and oriented to person, place, and time  Psychiatric:         Behavior: Behavior normal          Thought Content:  Thought content normal          Judgment: Judgment normal        Claudia Jordan MD

## 2023-04-05 ENCOUNTER — PROCEDURE VISIT (OUTPATIENT)
Dept: OBGYN CLINIC | Facility: CLINIC | Age: 58
End: 2023-04-05

## 2023-04-05 VITALS
WEIGHT: 137 LBS | HEIGHT: 64 IN | SYSTOLIC BLOOD PRESSURE: 124 MMHG | DIASTOLIC BLOOD PRESSURE: 68 MMHG | BODY MASS INDEX: 23.39 KG/M2

## 2023-04-05 DIAGNOSIS — N83.201 BILATERAL OVARIAN CYSTS: ICD-10-CM

## 2023-04-05 DIAGNOSIS — N83.202 BILATERAL OVARIAN CYSTS: ICD-10-CM

## 2023-04-05 DIAGNOSIS — D25.9 UTERINE LEIOMYOMA, UNSPECIFIED LOCATION: Primary | ICD-10-CM

## 2023-04-05 DIAGNOSIS — N95.0 POSTMENOPAUSAL BLEEDING: ICD-10-CM

## 2023-04-05 NOTE — PROGRESS NOTES
"Endometrial biopsy    Date/Time: 4/5/2023 4:54 PM  Performed by: Jhoana Sultana MD  Authorized by: Jhoana Sultana MD   Universal Protocol:  Consent: Verbal consent obtained  Written consent obtained  Risks and benefits: risks, benefits and alternatives were discussed  Consent given by: patient  Time out: Immediately prior to procedure a \"time out\" was called to verify the correct patient, procedure, equipment, support staff and site/side marked as required  Timeout called at: 4/5/2023 3:00 PM   Patient understanding: patient states understanding of the procedure being performed  Patient consent: the patient's understanding of the procedure matches consent given  Procedure consent: procedure consent matches procedure scheduled  Relevant documents: relevant documents present and verified  Test results: test results available and properly labeled  Site marked: the operative site was not marked  Radiology Images displayed and confirmed   If images not available, report reviewed: imaging studies available  Required items: required blood products, implants, devices, and special equipment available  Patient identity confirmed: verbally with patient      Indication:     Indications: Post-menopausal bleeding      Chronicity of post-menopausal bleeding:  New    Progression of post-menopausal bleeding:  Resolved  Procedure:     Procedure: endometrial biopsy with Pipelle      A bivalve speculum was placed in the vagina: yes      Cervix cleaned and prepped: no      A paracervical block was performed: no      An intracervical block was performed: no      The cervix was dilated: yes      Uterus sounded: yes      Uterus sound depth (cm):  5    Specimen collected: specimen collected and sent to pathology      Patient tolerated procedure well with no complications: yes    Findings:     Uterus size:  Non-gravid    Cervix: stenotic    Comments:     Procedure comments:  Scant tissue collected with one " pass      Assessment/Plan    Diagnoses and all orders for this visit:    Uterine leiomyoma, unspecified location  -     Cancel: Tissue Exam  -     Tissue Exam    Postmenopausal bleeding  EMBx collected today  Bilateral ovarian cysts  Reviewed ultrasound findings  Pt  Is concerned about the inability to rule out malignancy without a pathologic specimen  She is interested in hysterectomy with BSO  Spent time discussed risks/ benefits/ alternatives to TLH/BSO  Reviewed risk for injury to bowel, bladder, ureters, nerves, blood vessels  Discussed down-time and postop recovery  Other orders  -     Endometrial biopsy      I have spent a total time of 25 minutes on 04/05/23 in caring for this patient including Diagnostic results, Prognosis, Risks and benefits of tx options, Instructions for management and Impressions  Subjective     Luz Lorenzo is a 62 y o  female here for a problem visit  Patient is complaining of lower abdominal pain and bloating  She had a CT scan completed a few months prior which showed enlarged para-aortic LN and fibroid uterus  She does have a hx of celiac disease  Recent pelvic u/s to further evaluate for gyn pathology  Pelvic u/s:     FINDINGS:     UTERUS:  The uterus is retroverted in position, measuring 7 3 x 6 6 x 6 9 cm  Heterogeneous myometrium with fibroids;  -4 1 x 4 5 x 4 6 cm posterior fundal fibroid  -1 6 x 1 2 x 1 5 cm partially exophytic anterior uterine body fibroid  The cervix appears within normal limits      ENDOMETRIUM:    The endometrial echo complex has an AP caliber of 3 0 mm  Its appearance is within normal limits for age and shows no filling defects      OVARIES/ADNEXA:  Right ovary:  2 2 x 1 4 x 1 7 cm  2 7 mL  Doppler flow is within normal limits  1 5 x 1 4 x 1 2 cm cyst with tiny echogenic focus seen along the inferior wall, possibly calcification?     Left ovary:  2 0 x 1 3 x 1 2 cm  1 6 mL    Doppler flow is within normal limits  1 0 x 0 8 x 0 8 cm cyst with thin septation      OTHER:  No free fluid or loculated fluid collections  Patient Active Problem List   Diagnosis   • Neoplasm of uncertain behavior of bone   • Colon cancer screening   • Encounter for screening mammogram for breast cancer   • Screening for hypothyroidism   • Screening for hyperlipidemia   • Screening for hypertension   • Routine general medical examination at a health care facility   • Celiac disease   • Need for influenza vaccination   • Lymph node enlargement   • Abdominal pain         Gynecologic History  Patient's last menstrual period was 2021 (approximate)  Contraception: none  Last Pap: 2022- NILM     Obstetric History  OB History    Para Term  AB Living   3 3 3     3   SAB IAB Ectopic Multiple Live Births           3      # Outcome Date GA Lbr El/2nd Weight Sex Delivery Anes PTL Lv   3 Term      Vag-Spont   TANG   2 Term      Vag-Spont   TANG   1 Term      Vag-Spont   TANG         Past Medical History:   Diagnosis Date   • Anemia    • Celiac disease     last assessed: 2016   • Chicken pox     AS PER PT       Past Surgical History:   Procedure Laterality Date   • ROTATOR CUFF REPAIR  2017         Family History   Problem Relation Age of Onset   • Heart disease Mother         Pacemaker and defibrillator CHF   • Hypertension Mother    • Cancer Father         Bladder and kidney   • Dementia Father    • Depression Father             • Heart disease Father         Defibrillator   Quadruple bypass at 58 yrs   • Stroke Father    • Heart disease Family    • Diabetes Family    • Cancer Family    • Substance Abuse Neg Hx    • Mental illness Neg Hx        Social History     Socioeconomic History   • Marital status: /Civil Union     Spouse name: Not on file   • Number of children: Not on file   • Years of education: Not on file   • Highest education level: Not on file   Occupational History   • Not on file   Tobacco "Use   • Smoking status: Former   • Smokeless tobacco: Never   Vaping Use   • Vaping Use: Never used   Substance and Sexual Activity   • Alcohol use: Yes     Comment: social   • Drug use: Not Currently   • Sexual activity: Yes     Partners: Male     Birth control/protection: Male Sterilization   Other Topics Concern   • Not on file   Social History Narrative   • Not on file     Social Determinants of Health     Financial Resource Strain: Not on file   Food Insecurity: Not on file   Transportation Needs: Not on file   Physical Activity: Not on file   Stress: Not on file   Social Connections: Not on file   Intimate Partner Violence: Not on file   Housing Stability: Not on file       Allergies  Barley grass - food allergy, Oat - food allergy, and Wheat bran - food allergy    Medications    Current Outpatient Medications:   •  ALPRAZolam (Xanax) 0 25 mg tablet, Take 1 tablet (0 25 mg total) by mouth 3 (three) times a day as needed (for flying), Disp: 30 tablet, Rfl: 0  •  B-D INTEGRA SYRINGE 23G X 1\" 3 ML MISC, USE EVERY 30 (THIRTY) DAYS, Disp: 3 each, Rfl: 3  •  cyanocobalamin 1,000 mcg/mL, INJECT 1 ML (1,000 MCG TOTAL) INTO A MUSCLE EVERY 30 (THIRTY) DAYS, Disp: 3 mL, Rfl: 6  •  multivitamin (THERAGRAN) TABS, Take 1 tablet by mouth daily, Disp: , Rfl:       Review of Systems  Review of Systems   Constitutional: Negative for activity change, chills, fever and unexpected weight change  HENT: Negative for congestion, ear pain, hearing loss and sore throat  Respiratory: Negative for cough, chest tightness and shortness of breath  Cardiovascular: Negative for chest pain and leg swelling  Gastrointestinal: Positive for abdominal distention and abdominal pain  Negative for constipation, diarrhea, nausea and vomiting  Genitourinary: Positive for pelvic pain  Negative for difficulty urinating, dysuria, frequency, menstrual problem, vaginal discharge and vaginal pain  Skin: Negative for color change and rash   " "  Neurological: Negative for dizziness, numbness and headaches  Psychiatric/Behavioral: Negative for agitation and confusion  Objective     /68 (BP Location: Right arm, Patient Position: Sitting, Cuff Size: Standard)   Ht 5' 4\" (1 626 m)   Wt 62 1 kg (137 lb)   LMP 08/13/2021 (Approximate)   BMI 23 52 kg/m²       Physical Exam  Constitutional:       General: She is not in acute distress  Appearance: Normal appearance  She is normal weight  Genitourinary:      Vulva, bladder, rectum and urethral meatus normal       No lesions in the vagina  Right Labia: No rash, tenderness or lesions  Left Labia: No tenderness, lesions or rash  No labial fusion noted  No vaginal discharge, tenderness or bleeding  No vaginal prolapse present  Mild vaginal atrophy present  Right Adnexa: not palpable  Left Adnexa: not palpable  Cervix is not nulliparous  No cervical motion tenderness or discharge  HENT:      Head: Normocephalic and atraumatic  Nose: Nose normal    Eyes:      Conjunctiva/sclera: Conjunctivae normal       Pupils: Pupils are equal, round, and reactive to light  Cardiovascular:      Rate and Rhythm: Normal rate  Pulses: Normal pulses  Pulmonary:      Effort: Pulmonary effort is normal    Abdominal:      General: Abdomen is flat  Palpations: Abdomen is soft  Musculoskeletal:         General: Normal range of motion  Cervical back: Normal range of motion  Neurological:      General: No focal deficit present  Mental Status: She is alert and oriented to person, place, and time  Mental status is at baseline  Skin:     General: Skin is warm and dry  Psychiatric:         Mood and Affect: Mood normal          Behavior: Behavior normal          Thought Content: Thought content normal          Judgment: Judgment normal    Vitals and nursing note reviewed                 "

## 2023-05-09 ENCOUNTER — OFFICE VISIT (OUTPATIENT)
Dept: PODIATRY | Facility: CLINIC | Age: 58
End: 2023-05-09

## 2023-05-09 VITALS — BODY MASS INDEX: 23.22 KG/M2 | HEIGHT: 64 IN | WEIGHT: 136 LBS

## 2023-05-09 DIAGNOSIS — I73.00 RAYNAUD'S PHENOMENON WITHOUT GANGRENE: ICD-10-CM

## 2023-05-09 DIAGNOSIS — M77.52 CAPSULITIS OF METATARSOPHALANGEAL (MTP) JOINT OF LEFT FOOT: ICD-10-CM

## 2023-05-09 DIAGNOSIS — G57.92 PERIPHERAL NEURITIS OF LEFT FOOT: Primary | ICD-10-CM

## 2023-05-09 NOTE — PROGRESS NOTES
Assessment/Plan:     The patient's clinical examination today is consistent with slightly improved discoloration to the patient's forefoot bilaterally secondary to suspected Raynaud's phenomenon  The tenderness noted to her plantar left second MTPJ is also improved with home therapy and stretching exercises  Her main complaint today is tenderness to the second intermetatarsal space with some numbness to her second and third toes consistent with intermetatarsal neuritis  Treatment options were discussed with the patient, and she is agreeable to injection therapy of the intermetatarsal nerve of the second intermetatarsal space today  After prepping of skin with alcohol, a CSI was administered to the second intermetatarsal space without complication  Recommend follow-up in 3 to 4 weeks to assess efficacy of injection therapy  Diagnoses and all orders for this visit:    Peripheral neuritis of left foot    Raynaud's phenomenon without gangrene    Capsulitis of metatarsophalangeal (MTP) joint of left foot    Other orders  -     Nerve block          Subjective:     Patient ID: Izzy Law is a 62 y o  female  The patient presents today for follow-up of suspected Raynaud's phenomenon to the forefoot bilaterally  She also was diagnosed with capsulitis of the left second MTPJ at her last visit  Discoloration is improved as is the pain beneath her left second toe joint  The main issue she notes today is some numbness that she feels between the left second and third toes  There has been no interval injury or trauma to the left foot  Review of Systems   Constitutional: Negative  HENT: Negative  Eyes: Negative  Respiratory: Negative  Cardiovascular: Negative  Endocrine: Negative  Musculoskeletal: Negative  Neurological: Negative  Hematological: Negative  Psychiatric/Behavioral: Negative            Objective:     Physical Exam  Constitutional:       Appearance: Normal "appearance  HENT:      Head: Normocephalic and atraumatic  Nose: Nose normal    Cardiovascular:      Pulses:           Dorsalis pedis pulses are 2+ on the right side and 2+ on the left side  Posterior tibial pulses are 2+ on the right side and 2+ on the left side  Pulmonary:      Effort: Pulmonary effort is normal    Musculoskeletal:        Feet:    Feet:      Comments: The patient's clinical examination today is consistent with slightly improved discoloration to the patient's forefoot bilaterally secondary to suspected Raynaud's phenomenon  The tenderness noted to her plantar left second MTPJ is also improved with home therapy and stretching exercises  Her main complaint today is tenderness to the second intermetatarsal space with some numbness to her second and third toes consistent with intermetatarsal neuritis  Skin:     General: Skin is warm  Capillary Refill: Capillary refill takes less than 2 seconds  Neurological:      General: No focal deficit present  Mental Status: She is alert and oriented to person, place, and time  Psychiatric:         Mood and Affect: Mood normal          Behavior: Behavior normal          Thought Content: Thought content normal            Nerve block    Date/Time: 5/9/2023 11:10 AM  Performed by: David Nielsen DPM  Authorized by: David Nielsen DPM     Patient location:  [facility]  [place] Protocol:  Procedure performed by:  Risks and benefits: risks, benefits and alternatives were discussed  Consent given by: patient  Time out: Immediately prior to procedure a \"time out\" was called to verify the correct patient, procedure, equipment, support staff and site/side marked as required    Timeout called at: 5/9/2023 11:10 AM   Patient understanding: patient states understanding of the procedure being performed  Patient consent: the patient's understanding of the procedure matches consent given  Required items: required blood products, implants, " devices, and special equipment available  Patient identity confirmed: verbally with patient      Indications:     Indications:  Pain relief  Location:     Body area:  Lower extremity    Lower extremity nerve:  Plantar    Nerve type:  Peripheral    Laterality:  Left  Pre-procedure details:     Skin preparation:  Alcohol  Skin anesthesia (see MAR for exact dosages):     Skin anesthesia method:  Topical application (Ethyl chloride)  Procedure details (see MAR for exact dosages): Block needle gauge:  25 G    Anesthetic injected:  Bupivacaine 0 25% w/o epi    Steroid injected:  Triamcinolone    Additive injected:  None    Injection procedure:  Anatomic landmarks identified and introduced needle  Post-procedure details:     Dressing:  Sterile dressing    Outcome:  Pain improved    Patient tolerance of procedure: Tolerated well, no immediate complications  Comments:      After prepping the skin with alcohol, the point of maximum tenderness was palpated between the left second and third metatarsals and an injection was administered consisting of 1 5 mL of 0 25% bupivacaine plain, and 0 5 mL of Kenalog 40 into the left second intermetatarsal space  The procedure was tolerated well

## 2023-05-19 ENCOUNTER — PREP FOR PROCEDURE (OUTPATIENT)
Dept: OBGYN CLINIC | Facility: CLINIC | Age: 58
End: 2023-05-19

## 2023-05-19 DIAGNOSIS — D25.9 UTERINE LEIOMYOMA, UNSPECIFIED LOCATION: Primary | ICD-10-CM

## 2023-05-19 DIAGNOSIS — N83.202 BILATERAL OVARIAN CYSTS: ICD-10-CM

## 2023-05-19 DIAGNOSIS — N83.201 BILATERAL OVARIAN CYSTS: ICD-10-CM

## 2023-05-19 RX ORDER — PHENAZOPYRIDINE HYDROCHLORIDE 100 MG/1
100 TABLET, FILM COATED ORAL ONCE
OUTPATIENT
Start: 2023-08-01

## 2023-05-19 RX ORDER — ACETAMINOPHEN 325 MG/1
975 TABLET ORAL ONCE
OUTPATIENT
Start: 2023-05-19 | End: 2023-05-19

## 2023-05-19 RX ORDER — GABAPENTIN 100 MG/1
200 CAPSULE ORAL ONCE
OUTPATIENT
Start: 2023-05-19 | End: 2023-05-19

## 2023-05-19 RX ORDER — SODIUM CHLORIDE, SODIUM LACTATE, POTASSIUM CHLORIDE, CALCIUM CHLORIDE 600; 310; 30; 20 MG/100ML; MG/100ML; MG/100ML; MG/100ML
125 INJECTION, SOLUTION INTRAVENOUS CONTINUOUS
OUTPATIENT
Start: 2023-05-19

## 2023-05-19 RX ORDER — CELECOXIB 100 MG/1
200 CAPSULE ORAL ONCE
OUTPATIENT
Start: 2023-05-19 | End: 2023-05-19

## 2023-05-22 ENCOUNTER — TELEPHONE (OUTPATIENT)
Dept: OBGYN CLINIC | Facility: CLINIC | Age: 58
End: 2023-05-22

## 2023-05-22 NOTE — TELEPHONE ENCOUNTER
Uma Wells Checked pts insurance to see if pt needs prior auth for surgery on 08/01/23   Using codes [phone] (TLH BSO) and 57315 (Cystoscopy)     NO PA REQUIRED   Call Ref # PA CPT online tool, 05/22/23 @ 3:37pm

## 2023-07-17 ENCOUNTER — OFFICE VISIT (OUTPATIENT)
Dept: OBGYN CLINIC | Facility: CLINIC | Age: 58
End: 2023-07-17
Payer: COMMERCIAL

## 2023-07-17 VITALS
WEIGHT: 137 LBS | SYSTOLIC BLOOD PRESSURE: 118 MMHG | HEIGHT: 64 IN | BODY MASS INDEX: 23.39 KG/M2 | DIASTOLIC BLOOD PRESSURE: 74 MMHG

## 2023-07-17 DIAGNOSIS — R10.2 PELVIC PAIN: Primary | ICD-10-CM

## 2023-07-17 DIAGNOSIS — D25.9 UTERINE LEIOMYOMA, UNSPECIFIED LOCATION: ICD-10-CM

## 2023-07-17 DIAGNOSIS — N83.202 CYSTS OF BOTH OVARIES: ICD-10-CM

## 2023-07-17 DIAGNOSIS — N83.201 CYSTS OF BOTH OVARIES: ICD-10-CM

## 2023-07-17 PROCEDURE — 99213 OFFICE O/P EST LOW 20 MIN: CPT | Performed by: OBSTETRICS & GYNECOLOGY

## 2023-07-18 NOTE — H&P (VIEW-ONLY)
Assessment/Plan    Diagnoses and all orders for this visit:    Pelvic pain  Plan for total laparoscopic hysterectomy with bilateral salpingo oophorectomy and cystoscopy. Discussed risks/ benefit/ alternatives to this procedure. Pt. Appears to understand risk for injury to bowel, bladder, ureters, nerves, and blood vessels. Discussed risk for open procedure. Reviewed post op follow up and limitations. Discussed women can see hormonal benefit from retaining ovaries up to ~ age 72 for bone and heart health. Pt. Feels more comfortable with b/l oophorectomy given ovarian cysts and does not want risk of potential additional surgery for removal in the future. Discussed 6 weeks of pelvic rest.  Questions answered to her satisfaction. Surgical consent signed.      Provided with pre-op instructions, carb drinks and hibiclens.       Uterine leiomyoma, unspecified location    Cysts of both ovaries      Denis Sofia is a 62 y.o. female here for a problem visit. Patient presents for pre-op visit for schedule TLH-BSO and cystoscopy for pelvic pain, uterine fibroids and ovarian cysts. EMB 4/05/2023    A. Endometrium, EMB:  -   Strips of benign atrophic endometrium.  -   Scant benign fragments of squamous epithelium.  -   Scant fragments of benign lower uterine segment/endocervical tissue.  -   Negative for dysplasia and malignancy     Patient is complaining of lower abdominal pain and bloating. She had a CT scan completed a few months prior which showed enlarged para-aortic LN and fibroid uterus. She does have a hx of celiac disease.      Pelvic u/s:     FINDINGS:     UTERUS:  The uterus is retroverted in position, measuring 7.3 x 6.6 x 6.9 cm. Heterogeneous myometrium with fibroids;  -4.1 x 4.5 x 4.6 cm posterior fundal fibroid. -1.6 x 1.2 x 1.5 cm partially exophytic anterior uterine body fibroid.   The cervix appears within normal limits.     ENDOMETRIUM:    The endometrial echo complex has an AP caliber of 3.0 mm. Its appearance is within normal limits for age and shows no filling defects.     OVARIES/ADNEXA:  Right ovary:  2.2 x 1.4 x 1.7 cm. 2.7 mL. Doppler flow is within normal limits. 1.5 x 1.4 x 1.2 cm cyst with tiny echogenic focus seen along the inferior wall, possibly calcification?     Left ovary:  2.0 x 1.3 x 1.2 cm. 1.6 mL. Doppler flow is within normal limits. 1.0 x 0.8 x 0.8 cm cyst with thin septation.     OTHER:  No free fluid or loculated fluid collections. Patient Active Problem List   Diagnosis   • Neoplasm of uncertain behavior of bone   • Colon cancer screening   • Encounter for screening mammogram for breast cancer   • Screening for hypothyroidism   • Screening for hyperlipidemia   • Screening for hypertension   • Routine general medical examination at a health care facility   • Celiac disease   • Need for influenza vaccination   • Lymph node enlargement   • Abdominal pain         Gynecologic History  Patient's last menstrual period was 2021 (approximate). Contraception: post menopausal status  Last Pap: 2022- NILM     Obstetric History  OB History    Para Term  AB Living   3 3 3     3   SAB IAB Ectopic Multiple Live Births           3      # Outcome Date GA Lbr El/2nd Weight Sex Delivery Anes PTL Lv   3 Term      Vag-Spont   TANG   2 Term      Vag-Spont   TANG   1 Term      Vag-Spont   TANG         Past Medical History:   Diagnosis Date   • Anemia    • Celiac disease     last assessed: 2016   • Chicken pox     AS PER PT       Past Surgical History:   Procedure Laterality Date   • ROTATOR CUFF REPAIR  2017         Family History   Problem Relation Age of Onset   • Heart disease Mother         Pacemaker and defibrillator CHF   • Hypertension Mother    • Cancer Father         Bladder and kidney   • Dementia Father    • Depression Father             • Heart disease Father         Defibrillator.  Quadruple bypass at 58 yrs   • Stroke Father    • Heart disease Family    • Diabetes Family    • Cancer Family    • Substance Abuse Neg Hx    • Mental illness Neg Hx        Social History     Socioeconomic History   • Marital status: /Civil Union     Spouse name: Not on file   • Number of children: Not on file   • Years of education: Not on file   • Highest education level: Not on file   Occupational History   • Not on file   Tobacco Use   • Smoking status: Former   • Smokeless tobacco: Never   Vaping Use   • Vaping Use: Never used   Substance and Sexual Activity   • Alcohol use: Yes     Comment: social   • Drug use: Not Currently   • Sexual activity: Yes     Partners: Male     Birth control/protection: Male Sterilization   Other Topics Concern   • Not on file   Social History Narrative   • Not on file     Social Determinants of Health     Financial Resource Strain: Not on file   Food Insecurity: Not on file   Transportation Needs: Not on file   Physical Activity: Not on file   Stress: Not on file   Social Connections: Not on file   Intimate Partner Violence: Not on file   Housing Stability: Not on file       Allergies  Barley grass - food allergy, Oat - food allergy, and Wheat bran - food allergy    Medications    Current Outpatient Medications:   •  ALPRAZolam (Xanax) 0.25 mg tablet, Take 1 tablet (0.25 mg total) by mouth 3 (three) times a day as needed (for flying), Disp: 30 tablet, Rfl: 0  •  B-D INTEGRA SYRINGE 23G X 1" 3 ML MISC, USE EVERY 30 (THIRTY) DAYS, Disp: 3 each, Rfl: 3  •  cyanocobalamin 1,000 mcg/mL, INJECT 1 ML (1,000 MCG TOTAL) INTO A MUSCLE EVERY 30 (THIRTY) DAYS, Disp: 3 mL, Rfl: 6  •  multivitamin (THERAGRAN) TABS, Take 1 tablet by mouth daily, Disp: , Rfl:       Review of Systems  Review of Systems   Constitutional: Negative for activity change, chills, fever and unexpected weight change. HENT: Negative for congestion, ear pain, hearing loss and sore throat.     Respiratory: Negative for cough, chest tightness and shortness of breath. Cardiovascular: Negative for chest pain and leg swelling. Gastrointestinal: Positive for abdominal distention and abdominal pain. Negative for constipation, diarrhea, nausea and vomiting. Genitourinary: Negative for difficulty urinating, dysuria, frequency, menstrual problem, pelvic pain, vaginal discharge and vaginal pain. Skin: Negative for color change and rash. Neurological: Negative for dizziness, numbness and headaches. Psychiatric/Behavioral: Negative for agitation and confusion. Objective     /74 (BP Location: Right arm, Patient Position: Sitting, Cuff Size: Standard)   Ht 5' 4" (1.626 m)   Wt 62.1 kg (137 lb)   LMP 08/13/2021 (Approximate)   BMI 23.52 kg/m²       Physical Exam  Constitutional:       General: She is not in acute distress. Appearance: Normal appearance. She is normal weight. HENT:      Head: Normocephalic and atraumatic. Nose: Nose normal.   Eyes:      Conjunctiva/sclera: Conjunctivae normal.      Pupils: Pupils are equal, round, and reactive to light. Cardiovascular:      Rate and Rhythm: Normal rate and regular rhythm. Pulses: Normal pulses. Heart sounds: Normal heart sounds. Pulmonary:      Effort: Pulmonary effort is normal.      Breath sounds: Normal breath sounds. Musculoskeletal:         General: Normal range of motion. Cervical back: Normal range of motion. Neurological:      General: No focal deficit present. Mental Status: She is alert and oriented to person, place, and time. Mental status is at baseline. Psychiatric:         Mood and Affect: Mood normal.         Behavior: Behavior normal.         Thought Content: Thought content normal.         Judgment: Judgment normal.   Vitals and nursing note reviewed.

## 2023-07-18 NOTE — PROGRESS NOTES
Assessment/Plan    Diagnoses and all orders for this visit:    Pelvic pain  Plan for total laparoscopic hysterectomy with bilateral salpingo oophorectomy and cystoscopy. Discussed risks/ benefit/ alternatives to this procedure. Pt. Appears to understand risk for injury to bowel, bladder, ureters, nerves, and blood vessels. Discussed risk for open procedure. Reviewed post op follow up and limitations. Discussed women can see hormonal benefit from retaining ovaries up to ~ age 72 for bone and heart health. Pt. Feels more comfortable with b/l oophorectomy given ovarian cysts and does not want risk of potential additional surgery for removal in the future. Discussed 6 weeks of pelvic rest.  Questions answered to her satisfaction. Surgical consent signed.      Provided with pre-op instructions, carb drinks and hibiclens.       Uterine leiomyoma, unspecified location    Cysts of both ovaries      Denis Palmer is a 62 y.o. female here for a problem visit. Patient presents for pre-op visit for schedule TLH-BSO and cystoscopy for pelvic pain, uterine fibroids and ovarian cysts. EMB 4/05/2023    A. Endometrium, EMB:  -   Strips of benign atrophic endometrium.  -   Scant benign fragments of squamous epithelium.  -   Scant fragments of benign lower uterine segment/endocervical tissue.  -   Negative for dysplasia and malignancy     Patient is complaining of lower abdominal pain and bloating. She had a CT scan completed a few months prior which showed enlarged para-aortic LN and fibroid uterus. She does have a hx of celiac disease.      Pelvic u/s:     FINDINGS:     UTERUS:  The uterus is retroverted in position, measuring 7.3 x 6.6 x 6.9 cm. Heterogeneous myometrium with fibroids;  -4.1 x 4.5 x 4.6 cm posterior fundal fibroid. -1.6 x 1.2 x 1.5 cm partially exophytic anterior uterine body fibroid.   The cervix appears within normal limits.     ENDOMETRIUM:    The endometrial echo complex has an AP caliber of 3.0 mm. Its appearance is within normal limits for age and shows no filling defects.     OVARIES/ADNEXA:  Right ovary:  2.2 x 1.4 x 1.7 cm. 2.7 mL. Doppler flow is within normal limits. 1.5 x 1.4 x 1.2 cm cyst with tiny echogenic focus seen along the inferior wall, possibly calcification?     Left ovary:  2.0 x 1.3 x 1.2 cm. 1.6 mL. Doppler flow is within normal limits. 1.0 x 0.8 x 0.8 cm cyst with thin septation.     OTHER:  No free fluid or loculated fluid collections. Patient Active Problem List   Diagnosis   • Neoplasm of uncertain behavior of bone   • Colon cancer screening   • Encounter for screening mammogram for breast cancer   • Screening for hypothyroidism   • Screening for hyperlipidemia   • Screening for hypertension   • Routine general medical examination at a health care facility   • Celiac disease   • Need for influenza vaccination   • Lymph node enlargement   • Abdominal pain         Gynecologic History  Patient's last menstrual period was 2021 (approximate). Contraception: post menopausal status  Last Pap: 2022- NILM     Obstetric History  OB History    Para Term  AB Living   3 3 3     3   SAB IAB Ectopic Multiple Live Births           3      # Outcome Date GA Lbr El/2nd Weight Sex Delivery Anes PTL Lv   3 Term      Vag-Spont   TANG   2 Term      Vag-Spont   TANG   1 Term      Vag-Spont   TANG         Past Medical History:   Diagnosis Date   • Anemia    • Celiac disease     last assessed: 2016   • Chicken pox     AS PER PT       Past Surgical History:   Procedure Laterality Date   • ROTATOR CUFF REPAIR  2017         Family History   Problem Relation Age of Onset   • Heart disease Mother         Pacemaker and defibrillator CHF   • Hypertension Mother    • Cancer Father         Bladder and kidney   • Dementia Father    • Depression Father             • Heart disease Father         Defibrillator.  Quadruple bypass at 58 yrs   • Stroke Father    • Heart disease Family    • Diabetes Family    • Cancer Family    • Substance Abuse Neg Hx    • Mental illness Neg Hx        Social History     Socioeconomic History   • Marital status: /Civil Union     Spouse name: Not on file   • Number of children: Not on file   • Years of education: Not on file   • Highest education level: Not on file   Occupational History   • Not on file   Tobacco Use   • Smoking status: Former   • Smokeless tobacco: Never   Vaping Use   • Vaping Use: Never used   Substance and Sexual Activity   • Alcohol use: Yes     Comment: social   • Drug use: Not Currently   • Sexual activity: Yes     Partners: Male     Birth control/protection: Male Sterilization   Other Topics Concern   • Not on file   Social History Narrative   • Not on file     Social Determinants of Health     Financial Resource Strain: Not on file   Food Insecurity: Not on file   Transportation Needs: Not on file   Physical Activity: Not on file   Stress: Not on file   Social Connections: Not on file   Intimate Partner Violence: Not on file   Housing Stability: Not on file       Allergies  Barley grass - food allergy, Oat - food allergy, and Wheat bran - food allergy    Medications    Current Outpatient Medications:   •  ALPRAZolam (Xanax) 0.25 mg tablet, Take 1 tablet (0.25 mg total) by mouth 3 (three) times a day as needed (for flying), Disp: 30 tablet, Rfl: 0  •  B-D INTEGRA SYRINGE 23G X 1" 3 ML MISC, USE EVERY 30 (THIRTY) DAYS, Disp: 3 each, Rfl: 3  •  cyanocobalamin 1,000 mcg/mL, INJECT 1 ML (1,000 MCG TOTAL) INTO A MUSCLE EVERY 30 (THIRTY) DAYS, Disp: 3 mL, Rfl: 6  •  multivitamin (THERAGRAN) TABS, Take 1 tablet by mouth daily, Disp: , Rfl:       Review of Systems  Review of Systems   Constitutional: Negative for activity change, chills, fever and unexpected weight change. HENT: Negative for congestion, ear pain, hearing loss and sore throat.     Respiratory: Negative for cough, chest tightness and shortness of breath. Cardiovascular: Negative for chest pain and leg swelling. Gastrointestinal: Positive for abdominal distention and abdominal pain. Negative for constipation, diarrhea, nausea and vomiting. Genitourinary: Negative for difficulty urinating, dysuria, frequency, menstrual problem, pelvic pain, vaginal discharge and vaginal pain. Skin: Negative for color change and rash. Neurological: Negative for dizziness, numbness and headaches. Psychiatric/Behavioral: Negative for agitation and confusion. Objective     /74 (BP Location: Right arm, Patient Position: Sitting, Cuff Size: Standard)   Ht 5' 4" (1.626 m)   Wt 62.1 kg (137 lb)   LMP 08/13/2021 (Approximate)   BMI 23.52 kg/m²       Physical Exam  Constitutional:       General: She is not in acute distress. Appearance: Normal appearance. She is normal weight. HENT:      Head: Normocephalic and atraumatic. Nose: Nose normal.   Eyes:      Conjunctiva/sclera: Conjunctivae normal.      Pupils: Pupils are equal, round, and reactive to light. Cardiovascular:      Rate and Rhythm: Normal rate and regular rhythm. Pulses: Normal pulses. Heart sounds: Normal heart sounds. Pulmonary:      Effort: Pulmonary effort is normal.      Breath sounds: Normal breath sounds. Musculoskeletal:         General: Normal range of motion. Cervical back: Normal range of motion. Neurological:      General: No focal deficit present. Mental Status: She is alert and oriented to person, place, and time. Mental status is at baseline. Psychiatric:         Mood and Affect: Mood normal.         Behavior: Behavior normal.         Thought Content: Thought content normal.         Judgment: Judgment normal.   Vitals and nursing note reviewed.

## 2023-07-20 ENCOUNTER — APPOINTMENT (OUTPATIENT)
Dept: PREADMISSION TESTING | Facility: HOSPITAL | Age: 58
End: 2023-07-20
Payer: COMMERCIAL

## 2023-07-24 ENCOUNTER — APPOINTMENT (OUTPATIENT)
Dept: LAB | Facility: CLINIC | Age: 58
End: 2023-07-24
Payer: COMMERCIAL

## 2023-07-24 DIAGNOSIS — D25.9 UTERINE LEIOMYOMA, UNSPECIFIED LOCATION: Primary | ICD-10-CM

## 2023-07-24 LAB
ALBUMIN SERPL BCP-MCNC: 3.9 G/DL (ref 3.5–5)
ALP SERPL-CCNC: 56 U/L (ref 46–116)
ALT SERPL W P-5'-P-CCNC: 30 U/L (ref 12–78)
ANION GAP SERPL CALCULATED.3IONS-SCNC: 4 MMOL/L
AST SERPL W P-5'-P-CCNC: 22 U/L (ref 5–45)
BASOPHILS # BLD AUTO: 0.05 THOUSANDS/ÂΜL (ref 0–0.1)
BASOPHILS NFR BLD AUTO: 1 % (ref 0–1)
BILIRUB SERPL-MCNC: 0.38 MG/DL (ref 0.2–1)
BUN SERPL-MCNC: 15 MG/DL (ref 5–25)
CALCIUM SERPL-MCNC: 9.2 MG/DL (ref 8.3–10.1)
CHLORIDE SERPL-SCNC: 108 MMOL/L (ref 96–108)
CO2 SERPL-SCNC: 31 MMOL/L (ref 21–32)
CREAT SERPL-MCNC: 0.72 MG/DL (ref 0.6–1.3)
EOSINOPHIL # BLD AUTO: 0.07 THOUSAND/ÂΜL (ref 0–0.61)
EOSINOPHIL NFR BLD AUTO: 2 % (ref 0–6)
ERYTHROCYTE [DISTWIDTH] IN BLOOD BY AUTOMATED COUNT: 12 % (ref 11.6–15.1)
EST. AVERAGE GLUCOSE BLD GHB EST-MCNC: 91 MG/DL
GFR SERPL CREATININE-BSD FRML MDRD: 93 ML/MIN/1.73SQ M
GLUCOSE P FAST SERPL-MCNC: 89 MG/DL (ref 65–99)
HBA1C MFR BLD: 4.8 %
HCT VFR BLD AUTO: 43.1 % (ref 34.8–46.1)
HGB BLD-MCNC: 14.6 G/DL (ref 11.5–15.4)
IMM GRANULOCYTES # BLD AUTO: 0 THOUSAND/UL (ref 0–0.2)
IMM GRANULOCYTES NFR BLD AUTO: 0 % (ref 0–2)
LYMPHOCYTES # BLD AUTO: 1.61 THOUSANDS/ÂΜL (ref 0.6–4.47)
LYMPHOCYTES NFR BLD AUTO: 40 % (ref 14–44)
MCH RBC QN AUTO: 29.7 PG (ref 26.8–34.3)
MCHC RBC AUTO-ENTMCNC: 33.9 G/DL (ref 31.4–37.4)
MCV RBC AUTO: 88 FL (ref 82–98)
MONOCYTES # BLD AUTO: 0.39 THOUSAND/ÂΜL (ref 0.17–1.22)
MONOCYTES NFR BLD AUTO: 10 % (ref 4–12)
NEUTROPHILS # BLD AUTO: 1.92 THOUSANDS/ÂΜL (ref 1.85–7.62)
NEUTS SEG NFR BLD AUTO: 47 % (ref 43–75)
NRBC BLD AUTO-RTO: 0 /100 WBCS
PLATELET # BLD AUTO: 299 THOUSANDS/UL (ref 149–390)
PMV BLD AUTO: 10.7 FL (ref 8.9–12.7)
POTASSIUM SERPL-SCNC: 4 MMOL/L (ref 3.5–5.3)
PROT SERPL-MCNC: 6.9 G/DL (ref 6.4–8.4)
RBC # BLD AUTO: 4.91 MILLION/UL (ref 3.81–5.12)
SODIUM SERPL-SCNC: 143 MMOL/L (ref 135–147)
WBC # BLD AUTO: 4.04 THOUSAND/UL (ref 4.31–10.16)

## 2023-07-24 PROCEDURE — 86901 BLOOD TYPING SEROLOGIC RH(D): CPT | Performed by: OBSTETRICS & GYNECOLOGY

## 2023-07-24 PROCEDURE — 86850 RBC ANTIBODY SCREEN: CPT | Performed by: OBSTETRICS & GYNECOLOGY

## 2023-07-24 PROCEDURE — 86900 BLOOD TYPING SEROLOGIC ABO: CPT | Performed by: OBSTETRICS & GYNECOLOGY

## 2023-07-24 PROCEDURE — 36415 COLL VENOUS BLD VENIPUNCTURE: CPT

## 2023-07-24 PROCEDURE — 83036 HEMOGLOBIN GLYCOSYLATED A1C: CPT

## 2023-07-24 PROCEDURE — 80053 COMPREHEN METABOLIC PANEL: CPT

## 2023-07-24 PROCEDURE — 85025 COMPLETE CBC W/AUTO DIFF WBC: CPT

## 2023-07-25 ENCOUNTER — LAB REQUISITION (OUTPATIENT)
Dept: LAB | Facility: HOSPITAL | Age: 58
End: 2023-07-25
Payer: COMMERCIAL

## 2023-07-25 DIAGNOSIS — Z13.6 SCREENING FOR HYPERTENSION: ICD-10-CM

## 2023-07-25 DIAGNOSIS — Z13.29 SCREENING FOR HYPOTHYROIDISM: Primary | ICD-10-CM

## 2023-07-25 DIAGNOSIS — Z00.00 ROUTINE GENERAL MEDICAL EXAMINATION AT A HEALTH CARE FACILITY: ICD-10-CM

## 2023-07-25 DIAGNOSIS — D25.9 LEIOMYOMA OF UTERUS, UNSPECIFIED: ICD-10-CM

## 2023-07-25 DIAGNOSIS — Z13.220 SCREENING FOR HYPERLIPIDEMIA: ICD-10-CM

## 2023-07-25 LAB
ABO GROUP BLD: NORMAL
BLD GP AB SCN SERPL QL: NEGATIVE
RH BLD: POSITIVE
SPECIMEN EXPIRATION DATE: NORMAL

## 2023-07-27 NOTE — PRE-PROCEDURE INSTRUCTIONS
Pre-Surgery Instructions:   Medication Instructions   • ALPRAZolam (Xanax) 0.25 mg tablet Uses PRN- OK to take day of surgery   • Cholecalciferol (VITAMIN D3 PO) Hold day of surgery. • cyanocobalamin 1,000 mcg/mL n/a   • Multiple Vitamins-Minerals (ZINC PO) Stop taking 7 days prior to surgery. • multivitamin (THERAGRAN) TABS Stop taking 7 days prior to surgery. Medication instructions for day surgery reviewed. Please use only a sip of water to take your instructed medications. Avoid all over the counter vitamins, supplements and NSAIDS for one week prior to surgery per anesthesia guidelines. Tylenol is ok to take as needed. You will receive a call one business day prior to surgery with an arrival time and hospital directions. If your surgery is scheduled on a Monday, the hospital will be calling you on the Friday prior to your surgery. If you have not heard from anyone by 8pm, please call the hospital supervisor through the hospital  at [phone]. Keisha Lee [phone]). Do not eat or drink anything after midnight the night before your surgery, including candy, mints, lifesavers, or chewing gum. Do not drink alcohol 24hrs before your surgery. Try not to smoke at least 24hrs before your surgery. Follow the pre surgery showering instructions as listed in the [facility] Surgical Experience Booklet” or otherwise provided by your surgeon's office. Do not shave the surgical area 24 hours before surgery. Do not apply any lotions, creams, including makeup, cologne, deodorant, or perfumes after showering on the day of your surgery. No contact lenses, eye make-up, or artificial eyelashes. Remove nail polish, including gel polish, and any artificial, gel, or acrylic nails if possible. Remove all jewelry including rings and body piercing jewelry. Wear causal clothing that is easy to take on and off. Consider your type of surgery. Keep any valuables, jewelry, piercings at home.  Please bring any specially ordered equipment (sling, braces) if indicated. Arrange for a responsible person to drive you to and from the hospital on the day of your surgery. Visitor Guidelines discussed. Call the surgeon's office with any new illnesses, exposures, or additional questions prior to surgery. Please reference your [facility] Surgical Experience Booklet” for additional information to prepare for your upcoming surgery. Pt has surgical soap. ERAS reviewed.

## 2023-07-31 ENCOUNTER — ANESTHESIA EVENT (OUTPATIENT)
Dept: PERIOP | Facility: HOSPITAL | Age: 58
End: 2023-07-31
Payer: COMMERCIAL

## 2023-08-01 ENCOUNTER — ANESTHESIA (OUTPATIENT)
Dept: PERIOP | Facility: HOSPITAL | Age: 58
End: 2023-08-01
Payer: COMMERCIAL

## 2023-08-01 ENCOUNTER — HOSPITAL ENCOUNTER (OUTPATIENT)
Facility: HOSPITAL | Age: 58
Setting detail: OUTPATIENT SURGERY
Discharge: HOME | End: 2023-08-01
Attending: OBSTETRICS & GYNECOLOGY | Admitting: OBSTETRICS & GYNECOLOGY
Payer: COMMERCIAL

## 2023-08-01 VITALS
HEIGHT: 64 IN | WEIGHT: 134 LBS | RESPIRATION RATE: 18 BRPM | SYSTOLIC BLOOD PRESSURE: 124 MMHG | HEART RATE: 80 BPM | OXYGEN SATURATION: 98 % | TEMPERATURE: 98.2 F | DIASTOLIC BLOOD PRESSURE: 65 MMHG | BODY MASS INDEX: 22.88 KG/M2

## 2023-08-01 DIAGNOSIS — D25.9 UTERINE LEIOMYOMA, UNSPECIFIED LOCATION: ICD-10-CM

## 2023-08-01 DIAGNOSIS — Z90.721 S/P HYSTERECTOMY WITH OOPHORECTOMY: Primary | ICD-10-CM

## 2023-08-01 DIAGNOSIS — N83.201 BILATERAL OVARIAN CYSTS: ICD-10-CM

## 2023-08-01 DIAGNOSIS — Z90.710 S/P HYSTERECTOMY WITH OOPHORECTOMY: Primary | ICD-10-CM

## 2023-08-01 DIAGNOSIS — N83.202 BILATERAL OVARIAN CYSTS: ICD-10-CM

## 2023-08-01 LAB
GLUCOSE SERPL-MCNC: 115 MG/DL (ref 65–140)
GLUCOSE SERPL-MCNC: 142 MG/DL (ref 65–140)

## 2023-08-01 PROCEDURE — 58571 TLH W/T/O 250 G OR LESS: CPT | Performed by: OBSTETRICS & GYNECOLOGY

## 2023-08-01 PROCEDURE — 88307 TISSUE EXAM BY PATHOLOGIST: CPT | Performed by: STUDENT IN AN ORGANIZED HEALTH CARE EDUCATION/TRAINING PROGRAM

## 2023-08-01 PROCEDURE — 82948 REAGENT STRIP/BLOOD GLUCOSE: CPT

## 2023-08-01 RX ORDER — CELECOXIB 100 MG/1
200 CAPSULE ORAL ONCE
Status: COMPLETED | OUTPATIENT
Start: 2023-08-01 | End: 2023-08-01

## 2023-08-01 RX ORDER — PROPOFOL 10 MG/ML
INJECTION, EMULSION INTRAVENOUS AS NEEDED
Status: DISCONTINUED | OUTPATIENT
Start: 2023-08-01 | End: 2023-08-01

## 2023-08-01 RX ORDER — FENTANYL CITRATE/PF 50 MCG/ML
25 SYRINGE (ML) INJECTION
Status: DISCONTINUED | OUTPATIENT
Start: 2023-08-01 | End: 2023-08-01 | Stop reason: HOSPADM

## 2023-08-01 RX ORDER — OXYCODONE HYDROCHLORIDE 5 MG/1
5 TABLET ORAL EVERY 4 HOURS PRN
Status: CANCELLED | OUTPATIENT
Start: 2023-08-01

## 2023-08-01 RX ORDER — FENTANYL CITRATE 50 UG/ML
INJECTION, SOLUTION INTRAMUSCULAR; INTRAVENOUS AS NEEDED
Status: DISCONTINUED | OUTPATIENT
Start: 2023-08-01 | End: 2023-08-01

## 2023-08-01 RX ORDER — ONDANSETRON 2 MG/ML
4 INJECTION INTRAMUSCULAR; INTRAVENOUS EVERY 6 HOURS PRN
Status: CANCELLED | OUTPATIENT
Start: 2023-08-01

## 2023-08-01 RX ORDER — SODIUM CHLORIDE, SODIUM LACTATE, POTASSIUM CHLORIDE, CALCIUM CHLORIDE 600; 310; 30; 20 MG/100ML; MG/100ML; MG/100ML; MG/100ML
125 INJECTION, SOLUTION INTRAVENOUS CONTINUOUS
Status: DISCONTINUED | OUTPATIENT
Start: 2023-08-01 | End: 2023-08-01 | Stop reason: HOSPADM

## 2023-08-01 RX ORDER — OXYCODONE HYDROCHLORIDE 5 MG/1
5 TABLET ORAL EVERY 4 HOURS PRN
Qty: 15 TABLET | Refills: 0 | Status: SHIPPED | OUTPATIENT
Start: 2023-08-01 | End: 2023-08-06

## 2023-08-01 RX ORDER — IBUPROFEN 600 MG/1
600 TABLET ORAL EVERY 6 HOURS PRN
Qty: 30 TABLET | Refills: 0 | Status: SHIPPED | OUTPATIENT
Start: 2023-08-01

## 2023-08-01 RX ORDER — LIDOCAINE HYDROCHLORIDE 10 MG/ML
INJECTION, SOLUTION EPIDURAL; INFILTRATION; INTRACAUDAL; PERINEURAL AS NEEDED
Status: DISCONTINUED | OUTPATIENT
Start: 2023-08-01 | End: 2023-08-01

## 2023-08-01 RX ORDER — BUPIVACAINE HYDROCHLORIDE AND EPINEPHRINE 5; 5 MG/ML; UG/ML
INJECTION, SOLUTION EPIDURAL; INTRACAUDAL; PERINEURAL AS NEEDED
Status: DISCONTINUED | OUTPATIENT
Start: 2023-08-01 | End: 2023-08-01 | Stop reason: HOSPADM

## 2023-08-01 RX ORDER — SODIUM CHLORIDE, SODIUM LACTATE, POTASSIUM CHLORIDE, CALCIUM CHLORIDE 600; 310; 30; 20 MG/100ML; MG/100ML; MG/100ML; MG/100ML
125 INJECTION, SOLUTION INTRAVENOUS CONTINUOUS
Status: CANCELLED | OUTPATIENT
Start: 2023-08-01

## 2023-08-01 RX ORDER — SENNOSIDES 8.6 MG
650 CAPSULE ORAL EVERY 8 HOURS PRN
Qty: 30 TABLET | Refills: 0 | Status: SHIPPED | OUTPATIENT
Start: 2023-08-01

## 2023-08-01 RX ORDER — MAGNESIUM HYDROXIDE 1200 MG/15ML
LIQUID ORAL AS NEEDED
Status: DISCONTINUED | OUTPATIENT
Start: 2023-08-01 | End: 2023-08-01 | Stop reason: HOSPADM

## 2023-08-01 RX ORDER — ONDANSETRON 2 MG/ML
INJECTION INTRAMUSCULAR; INTRAVENOUS AS NEEDED
Status: DISCONTINUED | OUTPATIENT
Start: 2023-08-01 | End: 2023-08-01

## 2023-08-01 RX ORDER — GABAPENTIN 100 MG/1
200 CAPSULE ORAL ONCE
Status: COMPLETED | OUTPATIENT
Start: 2023-08-01 | End: 2023-08-01

## 2023-08-01 RX ORDER — SODIUM CHLORIDE, SODIUM LACTATE, POTASSIUM CHLORIDE, CALCIUM CHLORIDE 600; 310; 30; 20 MG/100ML; MG/100ML; MG/100ML; MG/100ML
INJECTION, SOLUTION INTRAVENOUS CONTINUOUS PRN
Status: DISCONTINUED | OUTPATIENT
Start: 2023-08-01 | End: 2023-08-01

## 2023-08-01 RX ORDER — SUCCINYLCHOLINE/SOD CL,ISO/PF 100 MG/5ML
SYRINGE (ML) INTRAVENOUS AS NEEDED
Status: DISCONTINUED | OUTPATIENT
Start: 2023-08-01 | End: 2023-08-01

## 2023-08-01 RX ORDER — EPHEDRINE SULFATE 50 MG/ML
INJECTION INTRAVENOUS AS NEEDED
Status: DISCONTINUED | OUTPATIENT
Start: 2023-08-01 | End: 2023-08-01

## 2023-08-01 RX ORDER — PHENAZOPYRIDINE HYDROCHLORIDE 100 MG/1
100 TABLET, FILM COATED ORAL ONCE
Status: COMPLETED | OUTPATIENT
Start: 2023-08-01 | End: 2023-08-01

## 2023-08-01 RX ORDER — ACETAMINOPHEN 325 MG/1
975 TABLET ORAL ONCE
Status: COMPLETED | OUTPATIENT
Start: 2023-08-01 | End: 2023-08-01

## 2023-08-01 RX ORDER — MIDAZOLAM HYDROCHLORIDE 2 MG/2ML
INJECTION, SOLUTION INTRAMUSCULAR; INTRAVENOUS AS NEEDED
Status: DISCONTINUED | OUTPATIENT
Start: 2023-08-01 | End: 2023-08-01

## 2023-08-01 RX ORDER — ACETAMINOPHEN 325 MG/1
650 TABLET ORAL EVERY 6 HOURS SCHEDULED
Status: CANCELLED | OUTPATIENT
Start: 2023-08-01

## 2023-08-01 RX ORDER — PROMETHAZINE HYDROCHLORIDE 25 MG/ML
12.5 INJECTION, SOLUTION INTRAMUSCULAR; INTRAVENOUS ONCE AS NEEDED
Status: DISCONTINUED | OUTPATIENT
Start: 2023-08-01 | End: 2023-08-01 | Stop reason: HOSPADM

## 2023-08-01 RX ORDER — PROPOFOL 10 MG/ML
INJECTION, EMULSION INTRAVENOUS CONTINUOUS PRN
Status: DISCONTINUED | OUTPATIENT
Start: 2023-08-01 | End: 2023-08-01

## 2023-08-01 RX ORDER — IBUPROFEN 600 MG/1
600 TABLET ORAL EVERY 6 HOURS PRN
Status: CANCELLED | OUTPATIENT
Start: 2023-08-01

## 2023-08-01 RX ORDER — ROCURONIUM BROMIDE 10 MG/ML
INJECTION, SOLUTION INTRAVENOUS AS NEEDED
Status: DISCONTINUED | OUTPATIENT
Start: 2023-08-01 | End: 2023-08-01

## 2023-08-01 RX ORDER — ONDANSETRON 2 MG/ML
4 INJECTION INTRAMUSCULAR; INTRAVENOUS ONCE AS NEEDED
Status: DISCONTINUED | OUTPATIENT
Start: 2023-08-01 | End: 2023-08-01 | Stop reason: HOSPADM

## 2023-08-01 RX ORDER — GLYCOPYRROLATE 0.2 MG/ML
INJECTION INTRAMUSCULAR; INTRAVENOUS AS NEEDED
Status: DISCONTINUED | OUTPATIENT
Start: 2023-08-01 | End: 2023-08-01

## 2023-08-01 RX ORDER — CEFAZOLIN SODIUM 1 G/50ML
1000 SOLUTION INTRAVENOUS ONCE
Status: COMPLETED | OUTPATIENT
Start: 2023-08-01 | End: 2023-08-01

## 2023-08-01 RX ORDER — DEXAMETHASONE SODIUM PHOSPHATE 10 MG/ML
INJECTION, SOLUTION INTRAMUSCULAR; INTRAVENOUS AS NEEDED
Status: DISCONTINUED | OUTPATIENT
Start: 2023-08-01 | End: 2023-08-01

## 2023-08-01 RX ADMIN — SUGAMMADEX 150 MG: 100 INJECTION, SOLUTION INTRAVENOUS at 10:36

## 2023-08-01 RX ADMIN — SODIUM CHLORIDE, SODIUM LACTATE, POTASSIUM CHLORIDE, AND CALCIUM CHLORIDE: .6; .31; .03; .02 INJECTION, SOLUTION INTRAVENOUS at 08:41

## 2023-08-01 RX ADMIN — GLYCOPYRROLATE 0.2 MCG: 0.2 INJECTION, SOLUTION INTRAMUSCULAR; INTRAVENOUS at 08:57

## 2023-08-01 RX ADMIN — Medication 100 MG: at 08:37

## 2023-08-01 RX ADMIN — DEXAMETHASONE SODIUM PHOSPHATE 10 MG: 10 INJECTION, SOLUTION INTRAMUSCULAR; INTRAVENOUS at 08:55

## 2023-08-01 RX ADMIN — LIDOCAINE HYDROCHLORIDE 50 MG: 10 INJECTION, SOLUTION EPIDURAL; INFILTRATION; INTRACAUDAL; PERINEURAL at 08:37

## 2023-08-01 RX ADMIN — ROCURONIUM BROMIDE 10 MG: 10 INJECTION, SOLUTION INTRAVENOUS at 09:53

## 2023-08-01 RX ADMIN — PROPOFOL 50 MG: 10 INJECTION, EMULSION INTRAVENOUS at 10:33

## 2023-08-01 RX ADMIN — GABAPENTIN 200 MG: 100 CAPSULE ORAL at 07:41

## 2023-08-01 RX ADMIN — ROCURONIUM BROMIDE 40 MG: 10 INJECTION, SOLUTION INTRAVENOUS at 08:48

## 2023-08-01 RX ADMIN — CEFAZOLIN SODIUM 1000 MG: 1 SOLUTION INTRAVENOUS at 08:30

## 2023-08-01 RX ADMIN — SODIUM CHLORIDE, SODIUM LACTATE, POTASSIUM CHLORIDE, AND CALCIUM CHLORIDE: .6; .31; .03; .02 INJECTION, SOLUTION INTRAVENOUS at 08:08

## 2023-08-01 RX ADMIN — PROPOFOL 50 MG: 10 INJECTION, EMULSION INTRAVENOUS at 10:05

## 2023-08-01 RX ADMIN — PROPOFOL 50 MG: 10 INJECTION, EMULSION INTRAVENOUS at 10:24

## 2023-08-01 RX ADMIN — ONDANSETRON 4 MG: 2 INJECTION INTRAMUSCULAR; INTRAVENOUS at 10:32

## 2023-08-01 RX ADMIN — FENTANYL CITRATE 100 MCG: 50 INJECTION INTRAMUSCULAR; INTRAVENOUS at 08:36

## 2023-08-01 RX ADMIN — PROPOFOL 50 MG: 10 INJECTION, EMULSION INTRAVENOUS at 10:16

## 2023-08-01 RX ADMIN — PHENAZOPYRIDINE 100 MG: 100 TABLET ORAL at 07:42

## 2023-08-01 RX ADMIN — PROPOFOL 150 MCG/KG/MIN: 10 INJECTION, EMULSION INTRAVENOUS at 08:38

## 2023-08-01 RX ADMIN — GLYCOPYRROLATE 0.2 MCG: 0.2 INJECTION, SOLUTION INTRAMUSCULAR; INTRAVENOUS at 10:37

## 2023-08-01 RX ADMIN — FENTANYL CITRATE 50 MCG: 50 INJECTION INTRAMUSCULAR; INTRAVENOUS at 10:06

## 2023-08-01 RX ADMIN — EPHEDRINE SULFATE 7 MG: 50 INJECTION, SOLUTION INTRAVENOUS at 09:36

## 2023-08-01 RX ADMIN — ACETAMINOPHEN 975 MG: 325 TABLET, FILM COATED ORAL at 07:40

## 2023-08-01 RX ADMIN — CELECOXIB 200 MG: 100 CAPSULE ORAL at 07:41

## 2023-08-01 RX ADMIN — MIDAZOLAM 2 MG: 1 INJECTION INTRAMUSCULAR; INTRAVENOUS at 08:32

## 2023-08-01 RX ADMIN — FENTANYL CITRATE 25 MCG: 50 INJECTION INTRAMUSCULAR; INTRAVENOUS at 10:28

## 2023-08-01 RX ADMIN — EPHEDRINE SULFATE 8 MG: 50 INJECTION, SOLUTION INTRAVENOUS at 10:03

## 2023-08-01 RX ADMIN — FENTANYL CITRATE 25 MCG: 50 INJECTION INTRAMUSCULAR; INTRAVENOUS at 10:24

## 2023-08-01 RX ADMIN — PROPOFOL 200 MG: 10 INJECTION, EMULSION INTRAVENOUS at 08:37

## 2023-08-01 NOTE — INTERVAL H&P NOTE
H&P reviewed. After examining the patient I find no changes in the patients condition since the H&P had been written.     Vitals:    08/01/23 0726   BP: 143/65   Pulse: 77   Resp: 16   Temp: 98.5 °F (36.9 °C)   SpO2: 99%

## 2023-08-01 NOTE — OP NOTE
OPERATIVE REPORT  PATIENT NAME: Diana Arriola    :  1965  MRN: [id number]  Pt Location: AN OR ROOM 01    SURGERY DATE: 2023    Surgeon(s) and Role:     * Caswell Pallas, MD - Primary     * Valentino Briar, MD - Trinity Dobson MD - Assisting     * Laurita Nunez MD - Assisting    Preop Diagnosis:  Uterine leiomyoma, unspecified location [D25.9]  Bilateral ovarian cysts [N83.201, N83.202]    Post-Op Diagnosis Codes:     * Uterine leiomyoma, unspecified location [D25.9]     * Bilateral ovarian cysts [N83.201, N83.202]    Procedure(s):  Bilateral - HYSTERECTOMY LAPAROSCOPIC TOTAL ([facility]) WITH SALPINGO-OOPHERECTOMY  CYSTOSCOPY    Specimen(s):  ID Type Source Tests Collected by Time Destination   1 :  Tissue Uterus w/Bilateral Ovaries and Fallopian Tubes TISSUE EXAM Caswell Pallas, MD 2023 5020        Estimated Blood Loss:   50 mL    Drains:  Urethral Catheter Non-latex 16 Fr. (Active)   Number of days: 0       Anesthesia Type:   General    Operative Indications:  Uterine leiomyoma, unspecified location [D25.9]  Bilateral ovarian cysts [N83.201, N83.202]  Pelvic pain    Operative Findings:  Normal external genitalia  Parous cervix  Uterus sounded to 10 cm  Urethral caruncle  4 to 5 cm posterior fundal fibroid  2 cm supracervical fibroid  Normal appearing bilateral adnexa  No obvious bladder injuries with bilateral urethral reflux    Complications:   None    Procedure and Technique:  The patient was taken to the operating room where she was properly identified and general anesthesia was obtained without difficulty. The patient was given prophylactic intravenous antibiotics. A Liberty hugger was placed to maintain control of core body temperature. The patient was prepped and draped in the usual sterile manner in the dorsal lithotomy position using the stirrups.  Care was taken to avoid excessive flexion or extension of the patient's hips and knees and pressure on her extremities. A time out was performed and the above information confirmed. A montanez catheter was inserted into the bladder and a weighted speculum was placed into the vagina. The MURPHY retractor was placed into the vagina, and the anterior portion of the cervix was grasped with a single tooth tenaculum. The uterus sounded to 10cm. Stay sutures were placed on the cervix on the lateral aspects, and the Zulema manipulator was placed. The single tooth tenaculum and the weighted speculum were then removed from the vagina. The Zulema tip was inflated. Surgeons gloves were then changed, and attention was then turned to the abdomen, Where 4cc of local was injected infraumbilically. A small incision was then made infraumbilically, and a 02EW trocar and sleeve were inserted through the incision under direct visualization. Pneumoperitoneum was obtained. Laparoscopic visualization confirmed the intraperitoneal insertion of the port. Pneumoperitoneum was then maintained using carbon dioxide. Subsequently, after infiltrating the areas with local, two additional small incisions were made in both the right and left lower quadrants, approximately 3 cm above and 3 cm medial to the anterior superior iliac spine. Two ports (5mm, 5mm) were introduced under direct visualization. The patient was placed in trendelenburg, and the above findings were noted. Bilateral ureters were seen to be vermiculating well below the plane of dissection. The left IP ligament was visualized and grasped. The Enseal device was used to doubly coagulate and cut. The dissection was continued along mesosalpinx to the level of the round ligament   The Enseal dissecting instrument was then used to coagulate and cut the round ligament  The broad ligament was then coagulated and cut to the level of the uterine arteries. The Enseal was then used to coagulate the uterine artery along the left.   A bladder flap was then created and dissected approximately half of the way across the uterus at the level of the lower uterine segment. The bladder was pushed down. The instruments were then switched in the ports and using the Enseal, the right IP ligament was doubly coagulated and cut. We continued using the Enseal to coagulate and cut the round ligament  The broad ligament was then coagulated and cut to the level of the uterine arteries. The Enseal was used to coagulate the uterine artery on the right. A bladder flap was then completed using the Enseal coming across the uterus to meet the incision from the left side. The cardinal ligaments were then cauterized and cut used the Enseal dissecting instrument. At this point, the uterus was visualized and noted to be blanched. The colpotomy was performed with the monopolar spatula. Once the colpotomy was completed the uterus, cervix, ovaries and fallopian tubes were removed vaginally. A sterile glove with a lap sponge was placed in the vagina to maintain pneumoperitoneum. At this point attention was directed vaginally. The anterior and posterior aspects of the vaginal cuff were grasped with Allis clamps. 0 Vicryl was run anterior to posteriorly to close the vaginal cuff. There is excellent hemostasis. At this point, attention was then turned back to the abdomen after exchange for sterile gloves. The abdomen and pelvis was visualized, irrigated, and good hemostasis was noted. Floseal was placed onto the vaginal cuff. Good hemostasis was once again noted. Pneumoperitoneum was then evacuated and the trocars were removed. The trochar incisions were closed using 4.0 monocryl and a 2 x 2 dressing with Tegaderm was placed. The montanez was removed and cystoscopy was performed. Bilateral ureteral jets were visualized, as was the bladder dome. No damage to the bladder was visualized on cystoscopy. The bladder was drained and the cystoscope was removed.  All instruments were then removed from the vagina. The patient tolerated the procedure well and was transferred to the recovery room in stable condition. All needle, sponge, and instrument counts were noted to be correct x 2 at the end of the procedure. Dr Hever Nava and Tierra Sotelo were present and participated in the procedure. In order to complete this case in a minimally invasive manner to attending physicians were required.         Patient Disposition:  PACU  and extubated and stable        SIGNATURE: Judy Esposito MD  DATE: August 1, 2023  TIME: 10:41 AM

## 2023-08-01 NOTE — ANESTHESIA PREPROCEDURE EVALUATION
Procedure:  HYSTERECTOMY LAPAROSCOPIC TOTAL ([facility]) WITH SALPINGO-OOPHERECTOMY (Bilateral: Abdomen)  CYSTOSCOPY (Bladder)    Relevant Problems   No relevant active problems      Celiac disease last assessed: 9/27/2016   Chicken pox AS PER PT   Anemia    Anesthesia complication difficulty awakening and had trouble maintaing airway but didn't end up needing reintubation   Anxiety    Fibroid    GERD (gastroesophageal reflux disease)    Abnormal uterine bleeding    Kidney stone    Raynaud's disease    Back pain    Claustrophobia    Wears reading eyeglasses        Physical Exam    Airway    Mallampati score: II  TM Distance: >3 FB  Neck ROM: full     Dental       Cardiovascular  Cardiovascular exam normal    Pulmonary  Pulmonary exam normal     Other Findings        Anesthesia Plan  ASA Score- 2     Anesthesia Type- general with ASA Monitors. Additional Monitors:   Airway Plan: ETT. Comment: complication difficulty awakening and had trouble maintaing airway but didn't end up needing reintu . Plan Factors-Exercise tolerance (METS): >4 METS. Chart reviewed. EKG reviewed. Imaging results reviewed. Existing labs reviewed. Patient summary reviewed. Induction- intravenous. Postoperative Plan- Plan for postoperative opioid use. Planned trial extubation    Informed Consent- Anesthetic plan and risks discussed with patient. I personally reviewed this patient with the CRNA. Discussed and agreed on the Anesthesia Plan with the CRNA. Cedric Ornelas

## 2023-08-01 NOTE — DISCHARGE INSTRUCTIONS
Hysterectomy   WHAT YOU NEED TO KNOW:   A hysterectomy is surgery to remove your uterus. Your ovaries, fallopian tubes, cervix, or part of your vagina may also need to be removed. The organs and tissue that will be removed depends on your medical condition. DISCHARGE INSTRUCTIONS:   Call 911 for any of the following: You feel lightheaded, short of breath, and have chest pain. You cough up blood. Seek care immediately:   Your arm or leg feels warm, tender, and painful. It may look swollen and red. You have increasing abdominal or pelvic pain. Contact your healthcare provider or gynecologist if:   You have heavy vaginal bleeding that fills 1 or more sanitary pads in 1 hour. You have a fever. You have nausea or are vomiting. You feel pain or burning when you urinate, or you have trouble urinating. You have pus or a foul-smelling odor coming from your vagina. Your wound is red, swollen, or draining pus. You feel pressure in your rectum. You have questions or concerns about your condition or care. Medicines:   Prescription pain medicine  may be given. Ask your healthcare provider how to take this medicine safely. Stool softeners  help treat or prevent constipation. Take your medicine as directed. Contact your healthcare provider if you think your medicine is not helping or if you have side effects. Tell your provider if you are allergic to any medicine. Keep a list of the medicines, vitamins, and herbs you take. Include the amounts, and when and why you take them. Bring the list or the pill bottles to follow-up visits. Carry your medicine list with you in case of an emergency. Activity:   Wear an abdominal binder as directed. An abdominal binder will decrease pain when you move or cough. Rest as needed. Get up and move around as directed to help prevent blood clots. Start with short walks and slowly increase the distance every day.  Limit the number of times you climb stairs to 2 times each day. Plan most of your daily activities on one level of your home. Do not lift objects heavier than 10 pounds for 6 weeks. Avoid strenuous activity for 2 weeks. Do not strain during bowel movements. High-fiber foods and extra liquids can help you prevent constipation. Examples of high-fiber foods are fruit and bran. Prune juice and water are good liquids to drink. Do not have sex, use tampons, or douche for up to 8 weeks. Ask your healthcare provider if it is okay to take a tub bath. Do not go in pools or hot tubs for 6 weeks or as directed. Ask when it is safe for you to drive, return to work, and return to other regular activities. Wound care: If you have abdominal incisions, care for them as directed. Carefully wash around the wound with soap and water. It is okay to let the soap and water run over your incision. Do not  scrub your incision. Dry the area and put on new, clean bandages as directed. Change your bandages when they get wet or dirty. If you have strips of medical tape, let them fall off on their own. It may take 7 to 14 days for them to fall off. Check your incision every day for redness, swelling, or pus. Deep breathing:  Take deep breaths and cough 10 times each hour. This will decrease your risk for a lung infection. Take a deep breath and hold it for as long as you can. Let the air out and then cough strongly. Deep breaths help open your airway. You may be given an incentive spirometer to help you take deep breaths. Put the plastic piece in your mouth and take a slow, deep breath, then let the air out and cough. Repeat these steps 10 times every hour. Get support: This surgery may be life-changing for you and your family. You will no longer be able to get pregnant. Sudden changes in the levels of your hormones may occur and cause mood swings and depression. You may feel angry, sad, or frightened, or cry frequently and unexpectedly.  These feelings are normal. Talk to your healthcare provider about where you can get support. You can also ask if hormone replacement medicine is right for you. Follow up with your healthcare provider or gynecologist as directed: You may need to return to have stitches removed, and for other tests. Write down your questions so you remember to ask them during your visits. © Copyright Ale[employer] Perla 2022 Information is for End User's use only and may not be sold, redistributed or otherwise used for commercial purposes. The above information is an  only. It is not intended as medical advice for individual conditions or treatments. Talk to your doctor, nurse or pharmacist before following any medical regimen to see if it is safe and effective for you.

## 2023-08-01 NOTE — ANESTHESIA POSTPROCEDURE EVALUATION
Post-Op Assessment Note    CV Status:  Stable  Pain Score: 0    Pain management: adequate     Mental Status:  Arousable   Hydration Status:  Stable   PONV Controlled:  None   Airway Patency:  Patent      Post Op Vitals Reviewed: Yes      Staff: CRNA         No notable events documented.     BP   112/55   Temp   97   Pulse  66   Resp   12   SpO2   99

## 2023-08-04 PROCEDURE — 88307 TISSUE EXAM BY PATHOLOGIST: CPT | Performed by: STUDENT IN AN ORGANIZED HEALTH CARE EDUCATION/TRAINING PROGRAM

## 2023-08-15 ENCOUNTER — OFFICE VISIT (OUTPATIENT)
Dept: OBGYN CLINIC | Facility: CLINIC | Age: 58
End: 2023-08-15

## 2023-08-15 VITALS
DIASTOLIC BLOOD PRESSURE: 74 MMHG | HEIGHT: 64 IN | BODY MASS INDEX: 23.22 KG/M2 | WEIGHT: 136 LBS | SYSTOLIC BLOOD PRESSURE: 132 MMHG

## 2023-08-15 DIAGNOSIS — Z48.89 ENCOUNTER FOR POSTOPERATIVE CARE: Primary | ICD-10-CM

## 2023-08-15 DIAGNOSIS — N95.1 VASOMOTOR SYMPTOMS DUE TO MENOPAUSE: ICD-10-CM

## 2023-08-15 PROCEDURE — 99024 POSTOP FOLLOW-UP VISIT: CPT | Performed by: OBSTETRICS & GYNECOLOGY

## 2023-08-15 RX ORDER — ESTRADIOL 0.05 MG/D
1 PATCH, EXTENDED RELEASE TRANSDERMAL 2 TIMES WEEKLY
Qty: 8 PATCH | Refills: 11 | Status: SHIPPED | OUTPATIENT
Start: 2023-08-17 | End: 2024-08-16

## 2023-08-16 NOTE — PROGRESS NOTES
Assessment/Plan    Diagnoses and all orders for this visit:    Encounter for postoperative care  Reviewed post op instructions/ limitations  Follow up at 6 week post op  Reviewed pathology     Vasomotor symptoms due to menopause  -     estradiol (Vivelle-Dot) 0.05 MG/24HR; Place 1 patch on the skin 2 (two) times a week    Discussed options to treat vasomotor symptoms including paroxetine and HRT. Pt. Desires estradiol. Reviewed the risks/ benefit/ alt. Reviewed risks/ benefits based on information from the [facility]. Risk for dvt/pe, cva, MI, and breast cancer. Risks increase past age 58-71. Recommend the lowest effective dose for the shortest duration. Questions answered to her satisfaction. Denis Veliz is a 62 y.o. female here for a problem visit. Patient is complaining of worsening hot flushes, that have increased since surgery. Reports only vaginal spotting since TLH/BSO 2 weeks ago. Denies any pain at this time. Voiding and BM without issue. Patient Active Problem List   Diagnosis   • Neoplasm of uncertain behavior of bone   • Colon cancer screening   • Encounter for screening mammogram for breast cancer   • Screening for hypothyroidism   • Screening for hyperlipidemia   • Screening for hypertension   • Routine general medical examination at a health care facility   • Celiac disease   • Need for influenza vaccination   • Lymph node enlargement   • Abdominal pain         Gynecologic History  Patient's last menstrual period was 2021 (approximate).   Contraception: post menopausal status  Last Pap:      Obstetric History  OB History    Para Term  AB Living   3 3 3     3   SAB IAB Ectopic Multiple Live Births           3      # Outcome Date GA Lbr El/2nd Weight Sex Delivery Anes PTL Lv   3 Term      Vag-Spont   TANG   2 Term      Vag-Spont   TANG   1 Term      Vag-Spont   TANG         Past Medical History:   Diagnosis Date   • Abnormal uterine bleeding • Anemia    • Anesthesia complication     difficulty awakening and had trouble maintaing airway but didn't end up needing reintubation   • Anxiety    • Back pain    • Celiac disease     last assessed: 2016   • Chicken pox     AS PER PT   • Claustrophobia    • Fibroid    • GERD (gastroesophageal reflux disease)    • Kidney stone    • Raynaud's disease    • Wears reading eyeglasses        Past Surgical History:   Procedure Laterality Date   • COLONOSCOPY     • ENDOMETRIAL ABLATION     • CA CYSTOURETHROSCOPY N/A 2023    Procedure: CYSTOSCOPY;  Surgeon: Gee Giraldo MD;  Location: [facility];  Service: Gynecology   • CA LAPS TOTAL HYSTERECT 250 GM/< W/RMVL TUBE/OVARY Bilateral 2023    Procedure: HYSTERECTOMY LAPAROSCOPIC TOTAL ([address] [facility]) WITH SALPINGO-OOPHERECTOMY;  Surgeon: Gee Giraldo MD;  Location: [facility];  Service: Gynecology   • ROTATOR CUFF REPAIR Left          Family History   Problem Relation Age of Onset   • Heart disease Mother         Pacemaker and defibrillator CHF   • Hypertension Mother    • Cancer Father         Bladder and kidney   • Dementia Father    • Depression Father             • Heart disease Father         Defibrillator.  Quadruple bypass at 58 yrs   • Stroke Father    • Heart disease Family    • Diabetes Family    • Cancer Family    • Substance Abuse Neg Hx    • Mental illness Neg Hx        Social History     Socioeconomic History   • Marital status: /Civil Union     Spouse name: Not on file   • Number of children: Not on file   • Years of education: Not on file   • Highest education level: Not on file   Occupational History   • Not on file   Tobacco Use   • Smoking status: Former   • Smokeless tobacco: Never   • Tobacco comments:     Quit    Vaping Use   • Vaping Use: Never used   Substance and Sexual Activity   • Alcohol use: Yes     Comment: social   • Drug use: Not Currently   • Sexual activity: Yes     Partners: Male     Birth control/protection: Male Sterilization   Other Topics Concern   • Not on file   Social History Narrative   • Not on file     Social Determinants of Health     Financial Resource Strain: Not on file   Food Insecurity: Not on file   Transportation Needs: Not on file   Physical Activity: Not on file   Stress: Not on file   Social Connections: Not on file   Intimate Partner Violence: Not on file   Housing Stability: Not on file       Allergies  Barley grass - food allergy, Gluten meal - food allergy, Oat - food allergy, and Wheat bran - food allergy    Medications    Current Outpatient Medications:   •  acetaminophen (TYLENOL) 650 mg CR tablet, Take 1 tablet (650 mg total) by mouth every 8 (eight) hours as needed for mild pain, Disp: 30 tablet, Rfl: 0  •  ALPRAZolam (Xanax) 0.25 mg tablet, Take 1 tablet (0.25 mg total) by mouth 3 (three) times a day as needed (for flying), Disp: 30 tablet, Rfl: 0  •  B-D INTEGRA SYRINGE 23G X 1" 3 ML MISC, USE EVERY 30 (THIRTY) DAYS, Disp: 3 each, Rfl: 3  •  Cholecalciferol (VITAMIN D3 PO), Take 1 capsule by mouth in the morning, Disp: , Rfl:   •  cyanocobalamin 1,000 mcg/mL, INJECT 1 ML (1,000 MCG TOTAL) INTO A MUSCLE EVERY 30 (THIRTY) DAYS, Disp: 3 mL, Rfl: 6  •  [START ON 8/17/2023] estradiol (Vivelle-Dot) 0.05 MG/24HR, Place 1 patch on the skin 2 (two) times a week, Disp: 8 patch, Rfl: 11  •  ibuprofen (MOTRIN) 600 mg tablet, Take 1 tablet (600 mg total) by mouth every 6 (six) hours as needed for mild pain, Disp: 30 tablet, Rfl: 0  •  Multiple Vitamins-Minerals (ZINC PO), Take 1 tablet by mouth in the morning, Disp: , Rfl:   •  multivitamin (THERAGRAN) TABS, Take 1 tablet by mouth daily, Disp: , Rfl:       Review of Systems  Review of Systems   Constitutional: Negative for activity change, chills, fever and unexpected weight change. HENT: Negative for congestion, ear pain, hearing loss and sore throat. Respiratory: Negative for cough, chest tightness and shortness of breath. Cardiovascular: Negative for chest pain and leg swelling. Gastrointestinal: Negative for abdominal pain, constipation, diarrhea, nausea and vomiting. Endocrine: Positive for heat intolerance. Genitourinary: Negative for difficulty urinating, dysuria, frequency, menstrual problem, pelvic pain, vaginal discharge and vaginal pain. Per HPI    Skin: Negative for color change and rash. Neurological: Negative for dizziness, numbness and headaches. Psychiatric/Behavioral: Negative for agitation and confusion. Objective     /74 (BP Location: Right arm, Patient Position: Sitting, Cuff Size: Standard)   Ht 5' 4" (1.626 m)   Wt 61.7 kg (136 lb)   LMP 08/13/2021 (Approximate)   BMI 23.34 kg/m²       Physical Exam  Constitutional:       General: She is not in acute distress. Appearance: Normal appearance. She is normal weight. HENT:      Head: Normocephalic and atraumatic. Nose: Nose normal.   Eyes:      Conjunctiva/sclera: Conjunctivae normal.      Pupils: Pupils are equal, round, and reactive to light. Cardiovascular:      Rate and Rhythm: Normal rate. Pulses: Normal pulses. Pulmonary:      Effort: Pulmonary effort is normal.   Abdominal:      General: Abdomen is flat. There is no distension. Palpations: Abdomen is soft. There is no mass. Tenderness: There is no abdominal tenderness. There is no guarding or rebound. Comments: Incisions are c/d/i x 3    Musculoskeletal:         General: Normal range of motion. Cervical back: Normal range of motion. Neurological:      General: No focal deficit present. Mental Status: She is alert and oriented to person, place, and time. Mental status is at baseline. Skin:     General: Skin is warm and dry. Psychiatric:         Mood and Affect: Mood normal.         Behavior: Behavior normal.         Thought Content: Thought content normal.         Judgment: Judgment normal.   Vitals and nursing note reviewed.

## 2023-08-24 ENCOUNTER — HOSPITAL ENCOUNTER (OUTPATIENT)
Facility: HOSPITAL | Age: 58
Setting detail: OBSERVATION
Discharge: HOME | End: 2023-08-26
Attending: EMERGENCY MEDICINE | Admitting: OBSTETRICS & GYNECOLOGY
Payer: COMMERCIAL

## 2023-08-24 ENCOUNTER — APPOINTMENT (EMERGENCY)
Dept: CT IMAGING | Facility: HOSPITAL | Age: 58
End: 2023-08-24
Payer: COMMERCIAL

## 2023-08-24 ENCOUNTER — TELEPHONE (OUTPATIENT)
Dept: OBGYN CLINIC | Facility: CLINIC | Age: 58
End: 2023-08-24

## 2023-08-24 DIAGNOSIS — N76.0 VAGINAL CUFF CELLULITIS: Primary | ICD-10-CM

## 2023-08-24 DIAGNOSIS — R10.2 ACUTE PAIN IN FEMALE PELVIS: ICD-10-CM

## 2023-08-24 PROBLEM — Z90.721 S/P HYSTERECTOMY WITH OOPHORECTOMY: Status: ACTIVE | Noted: 2023-08-24

## 2023-08-24 PROBLEM — Z90.710 S/P HYSTERECTOMY WITH OOPHORECTOMY: Status: ACTIVE | Noted: 2023-08-24

## 2023-08-24 LAB
ALBUMIN SERPL BCP-MCNC: 4.4 G/DL (ref 3.5–5)
ALP SERPL-CCNC: 48 U/L (ref 34–104)
ALT SERPL W P-5'-P-CCNC: 16 U/L (ref 7–52)
ANION GAP SERPL CALCULATED.3IONS-SCNC: 7 MMOL/L
APTT PPP: 27 SECONDS (ref 23–37)
AST SERPL W P-5'-P-CCNC: 18 U/L (ref 13–39)
BACTERIA UR QL AUTO: ABNORMAL /HPF
BASOPHILS # BLD AUTO: 0.05 THOUSANDS/ÂΜL (ref 0–0.1)
BASOPHILS NFR BLD AUTO: 0 % (ref 0–1)
BILIRUB SERPL-MCNC: 0.8 MG/DL (ref 0.2–1)
BILIRUB UR QL STRIP: NEGATIVE
BUN SERPL-MCNC: 12 MG/DL (ref 5–25)
CALCIUM SERPL-MCNC: 9.3 MG/DL (ref 8.4–10.2)
CHLORIDE SERPL-SCNC: 102 MMOL/L (ref 96–108)
CLARITY UR: ABNORMAL
CO2 SERPL-SCNC: 30 MMOL/L (ref 21–32)
COLOR UR: ABNORMAL
CREAT SERPL-MCNC: 0.62 MG/DL (ref 0.6–1.3)
EOSINOPHIL # BLD AUTO: 0.01 THOUSAND/ÂΜL (ref 0–0.61)
EOSINOPHIL NFR BLD AUTO: 0 % (ref 0–6)
ERYTHROCYTE [DISTWIDTH] IN BLOOD BY AUTOMATED COUNT: 11.9 % (ref 11.6–15.1)
FLUAV RNA RESP QL NAA+PROBE: NEGATIVE
FLUBV RNA RESP QL NAA+PROBE: NEGATIVE
GFR SERPL CREATININE-BSD FRML MDRD: 100 ML/MIN/1.73SQ M
GLUCOSE SERPL-MCNC: 101 MG/DL (ref 65–140)
GLUCOSE UR STRIP-MCNC: NEGATIVE MG/DL
HCT VFR BLD AUTO: 39.2 % (ref 34.8–46.1)
HGB BLD-MCNC: 13.3 G/DL (ref 11.5–15.4)
HGB UR QL STRIP.AUTO: ABNORMAL
IMM GRANULOCYTES # BLD AUTO: 0.05 THOUSAND/UL (ref 0–0.2)
IMM GRANULOCYTES NFR BLD AUTO: 0 % (ref 0–2)
INR PPP: 0.97 (ref 0.84–1.19)
KETONES UR STRIP-MCNC: ABNORMAL MG/DL
LACTATE SERPL-SCNC: 0.6 MMOL/L (ref 0.5–2)
LEUKOCYTE ESTERASE UR QL STRIP: ABNORMAL
LYMPHOCYTES # BLD AUTO: 0.98 THOUSANDS/ÂΜL (ref 0.6–4.47)
LYMPHOCYTES NFR BLD AUTO: 8 % (ref 14–44)
MCH RBC QN AUTO: 30 PG (ref 26.8–34.3)
MCHC RBC AUTO-ENTMCNC: 33.9 G/DL (ref 31.4–37.4)
MCV RBC AUTO: 88 FL (ref 82–98)
MONOCYTES # BLD AUTO: 0.76 THOUSAND/ÂΜL (ref 0.17–1.22)
MONOCYTES NFR BLD AUTO: 6 % (ref 4–12)
MUCOUS THREADS UR QL AUTO: ABNORMAL
NEUTROPHILS # BLD AUTO: 10.58 THOUSANDS/ÂΜL (ref 1.85–7.62)
NEUTS SEG NFR BLD AUTO: 86 % (ref 43–75)
NITRITE UR QL STRIP: NEGATIVE
NON-SQ EPI CELLS URNS QL MICRO: ABNORMAL /HPF
NRBC BLD AUTO-RTO: 0 /100 WBCS
PH UR STRIP.AUTO: 5.5 [PH]
PLATELET # BLD AUTO: 268 THOUSANDS/UL (ref 149–390)
PMV BLD AUTO: 10 FL (ref 8.9–12.7)
POTASSIUM SERPL-SCNC: 3.6 MMOL/L (ref 3.5–5.3)
PROCALCITONIN SERPL-MCNC: <0.05 NG/ML
PROT SERPL-MCNC: 6.8 G/DL (ref 6.4–8.4)
PROT UR STRIP-MCNC: ABNORMAL MG/DL
PROTHROMBIN TIME: 13.5 SECONDS (ref 11.6–14.5)
RBC # BLD AUTO: 4.44 MILLION/UL (ref 3.81–5.12)
RBC #/AREA URNS AUTO: ABNORMAL /HPF
RSV RNA RESP QL NAA+PROBE: NEGATIVE
SARS-COV-2 RNA RESP QL NAA+PROBE: NEGATIVE
SODIUM SERPL-SCNC: 139 MMOL/L (ref 135–147)
SP GR UR STRIP.AUTO: 1.02 (ref 1–1.03)
UROBILINOGEN UR STRIP-ACNC: <2 MG/DL
WBC # BLD AUTO: 12.43 THOUSAND/UL (ref 4.31–10.16)
WBC #/AREA URNS AUTO: ABNORMAL /HPF

## 2023-08-24 PROCEDURE — 96374 THER/PROPH/DIAG INJ IV PUSH: CPT

## 2023-08-24 PROCEDURE — 36415 COLL VENOUS BLD VENIPUNCTURE: CPT | Performed by: EMERGENCY MEDICINE

## 2023-08-24 PROCEDURE — 74177 CT ABD & PELVIS W/CONTRAST: CPT

## 2023-08-24 PROCEDURE — 85025 COMPLETE CBC W/AUTO DIFF WBC: CPT | Performed by: EMERGENCY MEDICINE

## 2023-08-24 PROCEDURE — 87086 URINE CULTURE/COLONY COUNT: CPT | Performed by: EMERGENCY MEDICINE

## 2023-08-24 PROCEDURE — 0241U HB NFCT DS VIR RESP RNA 4 TRGT: CPT

## 2023-08-24 PROCEDURE — 85730 THROMBOPLASTIN TIME PARTIAL: CPT | Performed by: EMERGENCY MEDICINE

## 2023-08-24 PROCEDURE — 81001 URINALYSIS AUTO W/SCOPE: CPT | Performed by: EMERGENCY MEDICINE

## 2023-08-24 PROCEDURE — 99285 EMERGENCY DEPT VISIT HI MDM: CPT | Performed by: EMERGENCY MEDICINE

## 2023-08-24 PROCEDURE — 96361 HYDRATE IV INFUSION ADD-ON: CPT

## 2023-08-24 PROCEDURE — 83605 ASSAY OF LACTIC ACID: CPT | Performed by: EMERGENCY MEDICINE

## 2023-08-24 PROCEDURE — 80053 COMPREHEN METABOLIC PANEL: CPT | Performed by: EMERGENCY MEDICINE

## 2023-08-24 PROCEDURE — 99024 POSTOP FOLLOW-UP VISIT: CPT | Performed by: OBSTETRICS & GYNECOLOGY

## 2023-08-24 PROCEDURE — 96375 TX/PRO/DX INJ NEW DRUG ADDON: CPT

## 2023-08-24 PROCEDURE — 99284 EMERGENCY DEPT VISIT MOD MDM: CPT

## 2023-08-24 PROCEDURE — 84145 PROCALCITONIN (PCT): CPT | Performed by: EMERGENCY MEDICINE

## 2023-08-24 PROCEDURE — 85610 PROTHROMBIN TIME: CPT | Performed by: EMERGENCY MEDICINE

## 2023-08-24 RX ORDER — ACETAMINOPHEN 325 MG/1
650 TABLET ORAL EVERY 6 HOURS SCHEDULED
Status: DISCONTINUED | OUTPATIENT
Start: 2023-08-24 | End: 2023-08-26 | Stop reason: HOSPADM

## 2023-08-24 RX ORDER — METRONIDAZOLE 500 MG/100ML
500 INJECTION, SOLUTION INTRAVENOUS EVERY 8 HOURS
Status: DISCONTINUED | OUTPATIENT
Start: 2023-08-24 | End: 2023-08-26

## 2023-08-24 RX ORDER — ACETAMINOPHEN 325 MG/1
650 TABLET ORAL ONCE
Status: COMPLETED | OUTPATIENT
Start: 2023-08-24 | End: 2023-08-24

## 2023-08-24 RX ORDER — KETOROLAC TROMETHAMINE 30 MG/ML
15 INJECTION, SOLUTION INTRAMUSCULAR; INTRAVENOUS ONCE
Status: COMPLETED | OUTPATIENT
Start: 2023-08-24 | End: 2023-08-24

## 2023-08-24 RX ORDER — ONDANSETRON 2 MG/ML
4 INJECTION INTRAMUSCULAR; INTRAVENOUS EVERY 6 HOURS PRN
Status: DISCONTINUED | OUTPATIENT
Start: 2023-08-24 | End: 2023-08-26 | Stop reason: HOSPADM

## 2023-08-24 RX ORDER — MORPHINE SULFATE 4 MG/ML
4 INJECTION, SOLUTION INTRAMUSCULAR; INTRAVENOUS ONCE
Status: COMPLETED | OUTPATIENT
Start: 2023-08-24 | End: 2023-08-24

## 2023-08-24 RX ORDER — IBUPROFEN 600 MG/1
600 TABLET ORAL EVERY 6 HOURS SCHEDULED
Status: DISCONTINUED | OUTPATIENT
Start: 2023-08-24 | End: 2023-08-26 | Stop reason: HOSPADM

## 2023-08-24 RX ORDER — OXYCODONE HYDROCHLORIDE 5 MG/1
5 TABLET ORAL EVERY 4 HOURS PRN
Status: DISCONTINUED | OUTPATIENT
Start: 2023-08-24 | End: 2023-08-26 | Stop reason: HOSPADM

## 2023-08-24 RX ADMIN — MORPHINE SULFATE 4 MG: 4 INJECTION INTRAVENOUS at 12:26

## 2023-08-24 RX ADMIN — METRONIDAZOLE 500 MG: 500 INJECTION, SOLUTION INTRAVENOUS at 19:55

## 2023-08-24 RX ADMIN — IOHEXOL 80 ML: 350 INJECTION, SOLUTION INTRAVENOUS at 11:11

## 2023-08-24 RX ADMIN — OXYCODONE HYDROCHLORIDE 5 MG: 5 TABLET ORAL at 19:52

## 2023-08-24 RX ADMIN — SODIUM CHLORIDE 1000 ML: 0.9 INJECTION, SOLUTION INTRAVENOUS at 10:23

## 2023-08-24 RX ADMIN — ACETAMINOPHEN 650 MG: 325 TABLET, FILM COATED ORAL at 12:26

## 2023-08-24 RX ADMIN — KETOROLAC TROMETHAMINE 15 MG: 30 INJECTION, SOLUTION INTRAMUSCULAR at 10:23

## 2023-08-24 RX ADMIN — ACETAMINOPHEN 650 MG: 325 TABLET, FILM COATED ORAL at 19:48

## 2023-08-24 RX ADMIN — IOHEXOL 40 ML: 350 INJECTION, SOLUTION INTRAVENOUS at 11:23

## 2023-08-24 RX ADMIN — CEFTRIAXONE SODIUM 2000 MG: 10 INJECTION, POWDER, FOR SOLUTION INTRAVENOUS at 20:38

## 2023-08-24 NOTE — ED PROVIDER NOTES
History  Chief Complaint   Patient presents with   • Abdominal Pain     Pt presents to the ED with reports of rectal pain and abd pain onset this morning. Last bm this morning, no complications. Generalized body aches. Nausea no vomiting. S/p total hysterectomy 8/1       History provided by:  Patient  Abdominal Pain  Pain location:  Suprapubic  Pain quality: aching, bloating and dull    Pain radiates to:  Does not radiate  Pain severity:  Moderate  Onset quality:  Gradual  Duration:  1 week (Much worse last night)  Timing:  Constant  Progression:  Worsening  Chronicity:  New  Context comment:  Patient had a hysterectomy on August 1, had very minimal pain after surgery over the past week pain has worsened, developed some vaginal discharge and foul smelling urine. Last night pain worsened   " it feels like I am 1 day postop again"  Relieved by:  Not moving  Worsened by: Movement, palpation and position changes  Associated symptoms: no chest pain, no chills, no constipation, no cough, no diarrhea, no dysuria, no fever, no nausea, no shortness of breath, no sore throat and no vomiting        Prior to Admission Medications   Prescriptions Last Dose Informant Patient Reported? Taking?    ALPRAZolam (Xanax) 0.25 mg tablet  Self No No   Sig: Take 1 tablet (0.25 mg total) by mouth 3 (three) times a day as needed (for flying)   B-D INTEGRA SYRINGE 23G X 1" 3 ML MISC  Self No No   Sig: USE EVERY 30 (THIRTY) DAYS   Cholecalciferol (VITAMIN D3 PO)  Self Yes No   Sig: Take 1 capsule by mouth in the morning   Multiple Vitamins-Minerals (ZINC PO)  Self Yes No   Sig: Take 1 tablet by mouth in the morning   acetaminophen (TYLENOL) 650 mg CR tablet   No No   Sig: Take 1 tablet (650 mg total) by mouth every 8 (eight) hours as needed for mild pain   cyanocobalamin 1,000 mcg/mL  Self No No   Sig: INJECT 1 ML (1,000 MCG TOTAL) INTO A MUSCLE EVERY 30 (THIRTY) DAYS   estradiol (Vivelle-Dot) 0.05 MG/24HR   No No   Sig: Place 1 patch on the skin 2 (two) times a week   ibuprofen (MOTRIN) 600 mg tablet   No No   Sig: Take 1 tablet (600 mg total) by mouth every 6 (six) hours as needed for mild pain   multivitamin (THERAGRAN) TABS  Self Yes No   Sig: Take 1 tablet by mouth daily      Facility-Administered Medications: None       Past Medical History:   Diagnosis Date   • Abnormal uterine bleeding    • Anemia    • Anesthesia complication     difficulty awakening and had trouble maintaing airway but didn't end up needing reintubation   • Anxiety    • Back pain    • Celiac disease     last assessed: 2016   • Chicken pox     AS PER PT   • Claustrophobia    • Fibroid    • GERD (gastroesophageal reflux disease)    • Kidney stone    • Raynaud's disease    • Wears reading eyeglasses        Past Surgical History:   Procedure Laterality Date   • COLONOSCOPY     • ENDOMETRIAL ABLATION     • WA CYSTOURETHROSCOPY N/A 2023    Procedure: CYSTOSCOPY;  Surgeon: Marion Marie MD;  [facility];  Service: Gynecology   • WA LAPS TOTAL HYSTERECT 250 GM/< W/RMVL TUBE/OVARY Bilateral 2023    Procedure: HYSTERECTOMY LAPAROSCOPIC TOTAL ([address] [facility]) WITH SALPINGO-OOPHERECTOMY;  Surgeon: Marion Marie MD;  [facility];  Service: Gynecology   • ROTATOR CUFF REPAIR Left 2017       Family History   Problem Relation Age of Onset   • Heart disease Mother         Pacemaker and defibrillator CHF   • Hypertension Mother    • Cancer Father         Bladder and kidney   • Dementia Father    • Depression Father             • Heart disease Father         Defibrillator. Quadruple bypass at 58 yrs   • Stroke Father    • Heart disease Family    • Diabetes Family    • Cancer Family    • Substance Abuse Neg Hx    • Mental illness Neg Hx      I have reviewed and agree with the history as documented.     E-Cigarette/Vaping   • E-Cigarette Use Never User      E-Cigarette/Vaping Substances   • Nicotine No    • THC No    • CBD No    • Flavoring No    • Other No    • Unknown No      Social History     Tobacco Use   • Smoking status: Former   • Smokeless tobacco: Never   • Tobacco comments:     Quit 1989   Vaping Use   • Vaping Use: Never used   Substance Use Topics   • Alcohol use: Not Currently     Comment: social   • Drug use: Not Currently       Review of Systems   Constitutional: Negative for activity change, chills, diaphoresis and fever. HENT: Negative for congestion, sinus pressure and sore throat. Eyes: Negative for pain and visual disturbance. Respiratory: Negative for cough, chest tightness, shortness of breath, wheezing and stridor. Cardiovascular: Negative for chest pain and palpitations. Gastrointestinal: Positive for abdominal pain. Negative for abdominal distention, constipation, diarrhea, nausea and vomiting. Genitourinary: Negative for dysuria and frequency. Musculoskeletal: Negative for neck pain and neck stiffness. Skin: Negative for rash. Neurological: Negative for dizziness, speech difficulty, light-headedness, numbness and headaches. Physical Exam  Physical Exam  Vitals reviewed. Constitutional:       General: She is not in acute distress. Appearance: She is well-developed. She is not diaphoretic. HENT:      Head: Normocephalic and atraumatic. Right Ear: External ear normal.      Left Ear: External ear normal.      Nose: Nose normal.   Eyes:      General:         Right eye: No discharge. Left eye: No discharge. Pupils: Pupils are equal, round, and reactive to light. Neck:      Trachea: No tracheal deviation. Cardiovascular:      Rate and Rhythm: Normal rate and regular rhythm. Heart sounds: Normal heart sounds. No murmur heard. Pulmonary:      Effort: Pulmonary effort is normal. No respiratory distress. Breath sounds: Normal breath sounds. No stridor. Abdominal:      General: There is no distension. Palpations: Abdomen is soft. Tenderness:  There is abdominal tenderness in the right lower quadrant, suprapubic area and left lower quadrant. There is no guarding or rebound. Musculoskeletal:         General: Normal range of motion. Cervical back: Normal range of motion and neck supple. Skin:     General: Skin is warm and dry. Coloration: Skin is not pale. Findings: No erythema. Neurological:      General: No focal deficit present. Mental Status: She is alert and oriented to person, place, and time.          Vital Signs  ED Triage Vitals   Temperature Pulse Respirations Blood Pressure SpO2   08/24/23 0951 08/24/23 0951 08/24/23 0951 08/24/23 0951 08/24/23 0951   98.3 °F (36.8 °C) 84 16 115/57 96 %      Temp Source Heart Rate Source Patient Position - Orthostatic VS BP Location FiO2 (%)   08/24/23 0951 08/24/23 0951 08/24/23 0951 08/24/23 0951 --   Oral Monitor Sitting Right arm       Pain Score       08/24/23 1225       5           Vitals:    08/25/23 1854 08/25/23 2203 08/26/23 0226 08/26/23 0604   BP: 131/74 109/65 119/74 127/78   Pulse: 82 70 67 68   Patient Position - Orthostatic VS:             Visual Acuity      ED Medications  Medications   sodium chloride 0.9 % bolus 1,000 mL (0 mL Intravenous Stopped 8/24/23 1432)   ketorolac (TORADOL) injection 15 mg (15 mg Intravenous Given 8/24/23 1023)   iohexol (OMNIPAQUE) 350 MG/ML injection (SINGLE-DOSE) 80 mL (80 mL Intravenous Given 8/24/23 1111)   iohexol (OMNIPAQUE) 350 MG/ML injection (SINGLE-DOSE) 40 mL (40 mL Intravenous Given 8/24/23 1123)   acetaminophen (TYLENOL) tablet 650 mg (650 mg Oral Given 8/24/23 1226)   morphine injection 4 mg (4 mg Intravenous Given 8/24/23 1226)   methylene blue (PROVAYBLUE) injection 50 mg (50 mg Topical Given 8/25/23 1245)       Diagnostic Studies  Results Reviewed     Procedure Component Value Units Date/Time    Urine culture [[id number]] Collected: 08/24/23 1023    Lab Status: Final result Specimen: Urine, Clean Catch Updated: 08/25/23 1122     Urine Culture 50,000-59,000 cfu/ml    COVID/FLU/RSV [[id number]]  (Normal) Collected: 08/24/23 1747    Lab Status: Final result Specimen: Nares from Nose Updated: 08/24/23 1847     SARS-CoV-2 Negative     INFLUENZA A PCR Negative     INFLUENZA B PCR Negative     RSV PCR Negative    Narrative:      FOR PEDIATRIC PATIENTS - copy/paste COVID Guidelines URL to browser: [email or web address] ashx    SARS-CoV-2 assay is a Nucleic Acid Amplification assay intended for the  qualitative detection of nucleic acid from SARS-CoV-2 in nasopharyngeal  swabs. Results are for the presumptive identification of SARS-CoV-2 RNA. Positive results are indicative of infection with SARS-CoV-2, the virus  causing COVID-19, but do not rule out bacterial infection or co-infection  with other viruses. Laboratories within the [facility] and its  territories are required to report all positive results to the appropriate  public health authorities. Negative results do not preclude SARS-CoV-2  infection and should not be used as the sole basis for treatment or other  patient management decisions. Negative results must be combined with  clinical observations, patient history, and epidemiological information. This test has not been FDA cleared or approved. This test has been authorized by FDA under an Emergency Use Authorization  (EUA). This test is only authorized for the duration of time the  declaration that circumstances exist justifying the authorization of the  emergency use of an in vitro diagnostic tests for detection of SARS-CoV-2  virus and/or diagnosis of COVID-19 infection under section 564(b)(1) of  the Act, 21 U. S.C. 148MRC-2(L)(9), unless the authorization is terminated  or revoked sooner. The test has been validated but independent review by FDA  and CLIA is pending. Test performed using [employer] GeneXpert: This RT-PCR assay targets N2,  a region unique to SARS-CoV-2.  A conserved region in the E-gene was chosen  for pan-Sarbecovirus detection which includes SARS-CoV-2. According to CMS-2020-01-R, this platform meets the definition of high-throughput technology.     Urine Microscopic [[id number]]  (Abnormal) Collected: 08/24/23 1023    Lab Status: Final result Specimen: Urine, Clean Catch Updated: 08/24/23 1100     RBC, UA 2-4 /hpf      WBC, UA Innumerable /hpf      Epithelial Cells Innumerable /hpf      Bacteria, UA Occasional /hpf      MUCUS THREADS Occasional    Procalcitonin [[id number]]  (Normal) Collected: 08/24/23 1014    Lab Status: Final result Specimen: Blood from Arm, Left Updated: 08/24/23 1058     Procalcitonin <0.05 ng/ml     Comprehensive metabolic panel [[id number]] Collected: 08/24/23 1014    Lab Status: Final result Specimen: Blood from Arm, Left Updated: 08/24/23 1050     Sodium 139 mmol/L      Potassium 3.6 mmol/L      Chloride 102 mmol/L      CO2 30 mmol/L      ANION GAP 7 mmol/L      BUN 12 mg/dL      Creatinine 0.62 mg/dL      Glucose 101 mg/dL      Calcium 9.3 mg/dL      AST 18 U/L      ALT 16 U/L      Alkaline Phosphatase 48 U/L      Total Protein 6.8 g/dL      Albumin 4.4 g/dL      Total Bilirubin 0.80 mg/dL      eGFR 100 ml/min/1.73sq m     Narrative:      [place] guidelines for Chronic Kidney Disease (CKD):   •  Stage 1 with normal or high GFR (GFR > 90 mL/min/1.73 square meters)  •  Stage 2 Mild CKD (GFR = 60-89 mL/min/1.73 square meters)  •  Stage 3A Moderate CKD (GFR = 45-59 mL/min/1.73 square meters)  •  Stage 3B Moderate CKD (GFR = 30-44 mL/min/1.73 square meters)  •  Stage 4 Severe CKD (GFR = 15-29 mL/min/1.73 square meters)  •  Stage 5 End Stage CKD (GFR <15 mL/min/1.73 square meters)  Note: GFR calculation is accurate only with a steady state creatinine    Lactic acid, plasma (w/reflex if result > 2.0) [[id number]]  (Normal) Collected: 08/24/23 1014    Lab Status: Final result Specimen: Blood from Arm, Left Updated: 08/24/23 1046     LACTIC ACID 0.6 mmol/L     Narrative:      Result may be elevated if tourniquet was used during collection.     UA w Reflex to Microscopic w Reflex to Culture [[id number]]  (Abnormal) Collected: 08/24/23 1023    Lab Status: Final result Specimen: Urine, Clean Catch Updated: 08/24/23 1043     Color, UA Light Yellow     Clarity, UA Turbid     Specific Gravity, UA 1.017     pH, UA 5.5     Leukocytes, UA Large     Nitrite, UA Negative     Protein, UA Trace mg/dl      Glucose, UA Negative mg/dl      Ketones, UA 20 (1+) mg/dl      Urobilinogen, UA <2.0 mg/dl      Bilirubin, UA Negative     Occult Blood, UA Small    Protime-INR [[id number]]  (Normal) Collected: 08/24/23 1014    Lab Status: Final result Specimen: Blood from Arm, Left Updated: 08/24/23 1043     Protime 13.5 seconds      INR 0.97    APTT [[id number]]  (Normal) Collected: 08/24/23 1014    Lab Status: Final result Specimen: Blood from Arm, Left Updated: 08/24/23 1043     PTT 27 seconds     CBC and differential [[id number]]  (Abnormal) Collected: 08/24/23 1014    Lab Status: Final result Specimen: Blood from Arm, Left Updated: 08/24/23 1034     WBC 12.43 Thousand/uL      RBC 4.44 Million/uL      Hemoglobin 13.3 g/dL      Hematocrit 39.2 %      MCV 88 fL      MCH 30.0 pg      MCHC 33.9 g/dL      RDW 11.9 %      MPV 10.0 fL      Platelets 414 Thousands/uL      nRBC 0 /100 WBCs      Neutrophils Relative 86 %      Immat GRANS % 0 %      Lymphocytes Relative 8 %      Monocytes Relative 6 %      Eosinophils Relative 0 %      Basophils Relative 0 %      Neutrophils Absolute 10.58 Thousands/µL      Immature Grans Absolute 0.05 Thousand/uL      Lymphocytes Absolute 0.98 Thousands/µL      Monocytes Absolute 0.76 Thousand/µL      Eosinophils Absolute 0.01 Thousand/µL      Basophils Absolute 0.05 Thousands/µL                  CT abdomen pelvis with contrast   Final Result by Brianna Noel MD (08/24 1578)      Postsurgical changes of recent hysterectomy with inflammatory stranding and a small amount of free fluid in the pelvis. Slight hyperdensity is seen within the vaginal cuff on precontrast imaging which increases postcontrast suggesting the possibility of    surgical dehiscence with active bleeding. A nonvisualized vesicovaginal or rectovaginal fistula could also be considered. Ureterovaginal fistula considered unlikely given the good visualization of the left ureter and presence of a ureteral jet. The study was marked in [facility] for immediate notification. Workstation performed: [id number]                    Procedures  Procedures         ED Course  ED Course as of 08/26/23 1549   u Aug 24, 2023   1210   Repeat exam patient still uncomfortable, concern for intra-abdominal infection, await official CT read, likely consult OB/GYN   351 [id number] CT concerning for possible dehiscence of the vaginal cuff  unofficial read, Tiger text placed to  OB/GYN for consultation   1309 Discussed pelvic examination, will defer to [facility] team as differential includes vaginal cuff dehiscence, likely will do so large amount of pain. Discussed with patient who agrees and is thankful to not get multiple pelvic exams, contacted and will be will be down for evaluation   1500 OB team down evaluating the patient   [phone] Discussed case with OB/GYN resident, they will admit the patient under observation status under the service of Dr. Jani Palmer. Medical Decision Making        Initial ED assessment: 40-year-old female, lower abdominal pain and tenderness. ,  23 days postop total hysterectomy bilateral oophorectomy.     Initial DDx includes but is not limited to:   Pelvic abscess/postsurgical infection, diverticulitis, colitis, UTI, appendicitis    Initial ED plan:   Blood work, CT scan, ultimate disposition pending ED work-up but highly favor admission due to concerning story as I do feel she has a infection from recent surgery        Final ED summary/disposition:   After evaluation and workup in the emergency department,   patient ultimately admitted due to concern for developing vaginal cuff tear or vaginal cuff abscess    Amount and/or Complexity of Data Reviewed  Labs: ordered. Radiology: ordered. Risk  OTC drugs. Prescription drug management. Decision regarding hospitalization. Disposition  Final diagnoses:   Acute pain in female pelvis     Time reflects when diagnosis was documented in both MDM as applicable and the Disposition within this note     Time User Action Codes Description Comment    8/24/2023  3:39 PM Arlys Sprang Add [R10.2] Acute pain in female pelvis     8/26/2023 11:44 AM Lanie Beverage Add [N76.0] Vaginal cuff cellulitis       ED Disposition     ED Disposition   Admit    Condition   Stable    Date/Time   Thu Aug 24, 2023  3:40 PM    Comment   Case was discussed with OB GYN Resident and the patient's admission status was agreed to be Admission Status: observation status to the service of Dr. Bri Fletcher .            Follow-up Information    None         Discharge Medication List as of 8/26/2023 12:46 PM      START taking these medications    Details   metroNIDAZOLE (FLAGYL) 500 mg tablet Take 1 tablet (500 mg total) by mouth every 12 (twelve) hours for 27 doses, Starting Sat 8/26/2023, Until Sat 9/9/2023, Normal      ondansetron (ZOFRAN) 4 mg tablet Take 1 tablet (4 mg total) by mouth every 8 (eight) hours as needed for nausea or vomiting for up to 14 days, Starting Sat 8/26/2023, Until Sat 9/9/2023 at 2359, Normal      sulfamethoxazole-trimethoprim (BACTRIM DS) 800-160 mg per tablet Take 1 tablet by mouth every 12 (twelve) hours for 27 doses, Starting Sat 8/26/2023, Until Sat 9/9/2023, Normal         CONTINUE these medications which have NOT CHANGED    Details   acetaminophen (TYLENOL) 650 mg CR tablet Take 1 tablet (650 mg total) by mouth every 8 (eight) hours as needed for mild pain, Starting Tue 8/1/2023, Normal      ALPRAZolam (Xanax) 0.25 mg tablet Take 1 tablet (0.25 mg total) by mouth 3 (three) times a day as needed (for flying), Starting Mon 10/4/2021, Normal      B-D INTEGRA SYRINGE 23G X 1" 3 ML MISC USE EVERY 30 (THIRTY) DAYS, Normal      Cholecalciferol (VITAMIN D3 PO) Take 1 capsule by mouth in the morning, Historical Med      cyanocobalamin 1,000 mcg/mL INJECT 1 ML (1,000 MCG TOTAL) INTO A MUSCLE EVERY 30 (THIRTY) DAYS, Starting Fri 11/18/2022, Normal      estradiol (Vivelle-Dot) 0.05 MG/24HR Place 1 patch on the skin 2 (two) times a week, Starting Thu 8/17/2023, Until Fri 8/16/2024, Normal      ibuprofen (MOTRIN) 600 mg tablet Take 1 tablet (600 mg total) by mouth every 6 (six) hours as needed for mild pain, Starting Tue 8/1/2023, Normal      Multiple Vitamins-Minerals (ZINC PO) Take 1 tablet by mouth in the morning, Historical Med      multivitamin (THERAGRAN) TABS Take 1 tablet by mouth daily, Historical Med             No discharge procedures on file.     PDMP Review       Value Time User    PDMP Reviewed  Yes 10/4/2021  3:06 PM Mara Harper MD          ED Provider  Electronically Signed by           Marisela Kim DO  08/26/23 5417

## 2023-08-24 NOTE — TELEPHONE ENCOUNTER
Pt is three weeks post TLH. She states she is having body aches, fever and feels awful. Pt told to go to the ER for evaluation and Pt agreeable.

## 2023-08-24 NOTE — H&P
H&P Exam - Gynecology Oncology  Juli Major 62 y.o. female MRN: [id number]  Unit/Bed#: S -01 Encounter: [id number]      Assessment/Plan     A/P:   Combined abdominal and pelvic pain  Assessment & Plan  - S/p TLH and BSO 3/2/5395 with uncomplicated recovery thus far  Pain and fullness with light palpation of right edge of vaginal cuff  CT Abd/Pelvis w Contrast 8/24: Postsurgical changes of recent hysterectomy with inflammatory stranding and a small amount of free fluid in the pelvis. Slight hyperdensity is seen within the vaginal cuff on precontrast imaging which increases postcontrast suggesting the possibility of surgical dehiscence with active bleeding. A nonvisualized vesicovaginal or rectovaginal fistula could also be considered. Ureterovaginal fistula considered unlikely given the good visualization of the left ureter and presence of a ureteral jet. - Vitals wnl, afebrile, WBC and Procal wnl    Plan:  -Start Ceftriaxone and Metronidazole due to concern for possible evolving infection given tenderness to palpation of cuff and mild fullness  -Trend WBC  -Monitor for signs/symptoms of infection    S/P hysterectomy with oophorectomy  Assessment & Plan  - Estradiol patch for vasomotor symptoms      History of Present Illness     HPI:  Juli Major is a 62 y.o. female who presents with abdominal pain that began yesterday. Today she rates the pain a 4-5/10. She also endorses some nausea and feeling feverish with chills last evening, although her temperature at home was reportedly 99.9F. Denies vomiting. She also notes some odor to her urine and increased urinary frequency. Of note, she had a TLH with BSO on 8/1 and has been recovering well without complications. She had some mild pink tinged discharge and spotting last week but otherwise denies vaginal bleeding. She denies dysuria, hematuria. Oncology History    No history exists.        Review of Systems   Constitutional: Positive for chills and fever.   Respiratory: Negative for cough and shortness of breath. Gastrointestinal: Positive for abdominal pain (periumbilical and RLQ) and nausea. Negative for vomiting. Genitourinary: Positive for frequency and pelvic pain. Negative for decreased urine volume, difficulty urinating, dysuria, hematuria, urgency and vaginal bleeding. Neurological: Negative for dizziness, weakness and headaches. Historical Information   Past Medical History:   Diagnosis Date   • Abnormal uterine bleeding    • Anemia    • Anesthesia complication     difficulty awakening and had trouble maintaing airway but didn't end up needing reintubation   • Anxiety    • Back pain    • Celiac disease     last assessed: 2016   • Chicken pox     AS PER PT   • Claustrophobia    • Fibroid    • GERD (gastroesophageal reflux disease)    • Kidney stone    • Raynaud's disease    • Wears reading eyeglasses      Past Surgical History:   Procedure Laterality Date   • COLONOSCOPY     • ENDOMETRIAL ABLATION     • MN CYSTOURETHROSCOPY N/A 2023    Procedure: CYSTOSCOPY;  Surgeon: Gavin Worley MD;  Location: [facility];  Service: Gynecology   • MN LAPS TOTAL HYSTERECT 250 GM/< W/RMVL TUBE/OVARY Bilateral 2023    Procedure: HYSTERECTOMY LAPAROSCOPIC TOTAL ([facility]) WITH SALPINGO-OOPHERECTOMY;  Surgeon: Gavin Worley MD;  Location: [facility];  Service: Gynecology   • ROTATOR CUFF REPAIR Left      OB/GYN History:   Family History   Problem Relation Age of Onset   • Heart disease Mother         Pacemaker and defibrillator CHF   • Hypertension Mother    • Cancer Father         Bladder and kidney   • Dementia Father    • Depression Father             • Heart disease Father         Defibrillator.  Quadruple bypass at 58 yrs   • Stroke Father    • Heart disease Family    • Diabetes Family    • Cancer Family    • Substance Abuse Neg Hx    • Mental illness Neg Hx      Social History   Social History     Substance and Sexual Activity   Alcohol Use Not Currently    Comment: social     Social History     Substance and Sexual Activity   Drug Use Not Currently     Social History     Tobacco Use   Smoking Status Former   Smokeless Tobacco Never   Tobacco Comments    Quit 1989       Meds/Allergies   Medications Prior to Admission   Medication   • acetaminophen (TYLENOL) 650 mg CR tablet   • ALPRAZolam (Xanax) 0.25 mg tablet   • B-D INTEGRA SYRINGE 23G X 1" 3 ML MISC   • Cholecalciferol (VITAMIN D3 PO)   • cyanocobalamin 1,000 mcg/mL   • estradiol (Vivelle-Dot) 0.05 MG/24HR   • ibuprofen (MOTRIN) 600 mg tablet   • Multiple Vitamins-Minerals (ZINC PO)   • multivitamin (THERAGRAN) TABS     Allergies   Allergen Reactions   • Barley Grass - Food Allergy Vomiting, Dermatitis, Dizziness, GI Intolerance, Headache, Itching, Lightheadedness and Diarrhea   • Gluten Meal - Food Allergy GI Intolerance   • Oat - Food Allergy Dermatitis, GI Intolerance, Headache and Irritability   • Wheat Bran - Food Allergy Diarrhea, Dermatitis, GI Intolerance, Vomiting, Irritability, Anxiety and Headache     All wheat products       Objective   /63 (BP Location: Right arm)   Pulse 77   Temp 98.3 °F (36.8 °C) (Oral)   Resp 16   Wt 62.4 kg (137 lb 9.1 oz)   LMP 08/13/2021 (Approximate)   SpO2 97%   BMI 23.61 kg/m²       Intake/Output Summary (Last 24 hours) at 8/24/2023 1815  Last data filed at 8/24/2023 1432  Gross per 24 hour   Intake 1000 ml   Output --   Net 1000 ml       Physical Exam  Vitals reviewed. Constitutional:       General: She is not in acute distress. Appearance: Normal appearance. She is not ill-appearing. HENT:      Head: Normocephalic. Cardiovascular:      Rate and Rhythm: Normal rate. Pulmonary:      Effort: Pulmonary effort is normal.   Abdominal:      General: Abdomen is flat. Palpations: Abdomen is soft. Tenderness: There is abdominal tenderness (periumbilical and RLQ). There is no guarding or rebound. Genitourinary:     General: Normal vulva. Comments: Speculum exam: no evidence of erythema on visual insepection, vaginal cuff normal appearing, no evidence of active bleeding  No palpable evidence of cuff dehiscence on vaginal exam   Tenderness to light palpation of right edge of vaginal cuff, mild fullness of posterior vagina  Skin:     General: Skin is warm and dry. Neurological:      Mental Status: She is alert and oriented to person, place, and time. Psychiatric:         Mood and Affect: Mood normal.         Behavior: Behavior normal.         Thought Content:  Thought content normal.         Judgment: Judgment normal.         Lab Results:   Admission on 08/24/2023   Component Date Value   • WBC 08/24/2023 12.43 (H)    • RBC 08/24/2023 4.44    • Hemoglobin 08/24/2023 13.3    • Hematocrit 08/24/2023 39.2    • MCV 08/24/2023 88    • MCH 08/24/2023 30.0    • MCHC 08/24/2023 33.9    • RDW 08/24/2023 11.9    • MPV 08/24/2023 10.0    • Platelets 48/76/7990 268    • nRBC 08/24/2023 0    • Neutrophils Relative 08/24/2023 86 (H)    • Immat GRANS % 08/24/2023 0    • Lymphocytes Relative 08/24/2023 8 (L)    • Monocytes Relative 08/24/2023 6    • Eosinophils Relative 08/24/2023 0    • Basophils Relative 08/24/2023 0    • Neutrophils Absolute 08/24/2023 10.58 (H)    • Immature Grans Absolute 08/24/2023 0.05    • Lymphocytes Absolute 08/24/2023 0.98    • Monocytes Absolute 08/24/2023 0.76    • Eosinophils Absolute 08/24/2023 0.01    • Basophils Absolute 08/24/2023 0.05    • Protime 08/24/2023 13.5    • INR 08/24/2023 0.97    • PTT 08/24/2023 27    • Sodium 08/24/2023 139    • Potassium 08/24/2023 3.6    • Chloride 08/24/2023 102    • CO2 08/24/2023 30    • ANION GAP 08/24/2023 7    • BUN 08/24/2023 12    • Creatinine 08/24/2023 0.62    • Glucose 08/24/2023 101    • Calcium 08/24/2023 9.3    • AST 08/24/2023 18    • ALT 08/24/2023 16    • Alkaline Phosphatase 08/24/2023 48    • Total Protein 08/24/2023 6.8    • Albumin 08/24/2023 4.4    • Total Bilirubin 08/24/2023 0.80    • eGFR 08/24/2023 100    • LACTIC ACID 08/24/2023 0.6    • Color, UA 08/24/2023 Light Yellow    • Clarity, UA 08/24/2023 Turbid    • Specific Gravity, UA 08/24/2023 1.017    • pH, UA 08/24/2023 5.5    • Leukocytes, UA 08/24/2023 Large (A)    • Nitrite, UA 08/24/2023 Negative    • Protein, UA 08/24/2023 Trace (A)    • Glucose, UA 08/24/2023 Negative    • Ketones, UA 08/24/2023 20 (1+) (A)    • Urobilinogen, UA 08/24/2023 <2.0    • Bilirubin, UA 08/24/2023 Negative    • Occult Blood, UA 08/24/2023 Small (A)    • Procalcitonin 08/24/2023 <0.05    • RBC, UA 08/24/2023 2-4 (A)    • WBC, UA 08/24/2023 Innumerable (A)    • Epithelial Cells 08/24/2023 Innumerable (A)    • Bacteria, UA 08/24/2023 Occasional    • MUCUS THREADS 08/24/2023 Occasional (A)       Imaging:   CT Abdomen Pelvis with Contrast 8/24/2023:  FINDINGS:  ABDOMEN  LOWER CHEST: No clinically significant abnormality is identified in the visualized lower chest. No consolidation or effusion.     LIVER: Normal size and morphology. No suspicious lesion. Focal fat adjacent to the falciform ligament.     BILIARY: No intrahepatic biliary ductal dilatation. Normal caliber common bile duct.     GALLBLADDER: No calcified gallstones. Normal wall thickness. No pericholecystic inflammatory changes.     SPLEEN: Within normal limits. No suspicious lesion. Normal spleen size.     PANCREAS: Pancreatic parenchyma is within normal limits. No main pancreatic ductal dilatation. No pancreatic/peripancreatic inflammation.     ADRENAL GLANDS: Within normal limits.     KIDNEYS/URETERS: Normal size and position. Symmetric enhancement. No suspicious lesion. No calcified stones or hydronephrosis. Ureters within normal limits.     STOMACH AND BOWEL: Stomach is underdistended limiting evaluation, but grossly within normal limits. Normal caliber small bowel. Normal caliber large bowel.  No evidence of active small or large bowel inflammatory process.     APPENDIX: Normal appendix (series 603/82).     ABDOMINOPELVIC CAVITY: Fluid and stranding in the pelvis as described. No intraperitoneal free air. No lymphadenopathy. No retroperitoneal hematoma.     VESSELS: Normal caliber abdominal aorta with no detectable atherosclerotic plaque. The celiac, SMA, and AYESHA are patent. The SMV and splenic vein are patent. The portal veins are patent. The hepatic veins are patent.        PELVIS  REPRODUCTIVE ORGANS: Interval hysterectomy and bilateral oophorectomy. Diffuse inflammatory stranding with small amount of free fluid in the pelvis. There is a small amount of air and faint hyperdensity seen within the vaginal cuff on precontrast images   (e.g. series 301/138). After administration of contrast there is enhancement of numerous engorged parametrial vessels and increased contrast apparently pooling within the vaginal vault (308/137, 604/86).     URINARY BLADDER: Normal bladder wall thickness. No calculi. The bladder is filled with excreted contrast on postcontrast images with left ureteral jet seen.     ABDOMINAL WALL/INGUINAL REGIONS: Within normal limits.     BONES:  Mild multilevel degenerative changes in the spine. Vertebral body height is maintained. No acute fracture or destructive osseous lesion.        IMPRESSION:  Postsurgical changes of recent hysterectomy with inflammatory stranding and a small amount of free fluid in the pelvis. Slight hyperdensity is seen within the vaginal cuff on precontrast imaging which increases postcontrast suggesting the possibility of   surgical dehiscence with active bleeding. A nonvisualized vesicovaginal or rectovaginal fistula could also be considered. Ureterovaginal fistula considered unlikely given the good visualization of the left ureter and presence of a ureteral jet.       Code Status: No [place] MS4    Leigh Edmonds MD  PGY-3  8/24/2023  6:15 PM

## 2023-08-24 NOTE — ASSESSMENT & PLAN NOTE
- S/p TLH and BSO 0/4/8221 with uncomplicated recovery thus far, abdominal incisions are healing well  - She is feeling better with initiation of IV antibiotics, and will plan to transition to PO today  - Monitor for signs/symptoms of infection   - Negative blue dye tampon test yesterday, vesico vaginal fistula ruled out

## 2023-08-25 LAB
ANION GAP SERPL CALCULATED.3IONS-SCNC: 5 MMOL/L
BACTERIA UR CULT: NORMAL
BASOPHILS # BLD AUTO: 0.05 THOUSANDS/ÂΜL (ref 0–0.1)
BASOPHILS NFR BLD AUTO: 1 % (ref 0–1)
BUN SERPL-MCNC: 10 MG/DL (ref 5–25)
CALCIUM SERPL-MCNC: 8.6 MG/DL (ref 8.4–10.2)
CHLORIDE SERPL-SCNC: 105 MMOL/L (ref 96–108)
CO2 SERPL-SCNC: 29 MMOL/L (ref 21–32)
CREAT SERPL-MCNC: 0.57 MG/DL (ref 0.6–1.3)
EOSINOPHIL # BLD AUTO: 0.07 THOUSAND/ÂΜL (ref 0–0.61)
EOSINOPHIL NFR BLD AUTO: 1 % (ref 0–6)
ERYTHROCYTE [DISTWIDTH] IN BLOOD BY AUTOMATED COUNT: 11.9 % (ref 11.6–15.1)
GFR SERPL CREATININE-BSD FRML MDRD: 103 ML/MIN/1.73SQ M
GLUCOSE P FAST SERPL-MCNC: 81 MG/DL (ref 65–99)
GLUCOSE SERPL-MCNC: 81 MG/DL (ref 65–140)
HCT VFR BLD AUTO: 34.8 % (ref 34.8–46.1)
HGB BLD-MCNC: 11.8 G/DL (ref 11.5–15.4)
IMM GRANULOCYTES # BLD AUTO: 0.02 THOUSAND/UL (ref 0–0.2)
IMM GRANULOCYTES NFR BLD AUTO: 0 % (ref 0–2)
LYMPHOCYTES # BLD AUTO: 1.56 THOUSANDS/ÂΜL (ref 0.6–4.47)
LYMPHOCYTES NFR BLD AUTO: 18 % (ref 14–44)
MCH RBC QN AUTO: 29.9 PG (ref 26.8–34.3)
MCHC RBC AUTO-ENTMCNC: 33.9 G/DL (ref 31.4–37.4)
MCV RBC AUTO: 88 FL (ref 82–98)
MONOCYTES # BLD AUTO: 0.53 THOUSAND/ÂΜL (ref 0.17–1.22)
MONOCYTES NFR BLD AUTO: 6 % (ref 4–12)
NEUTROPHILS # BLD AUTO: 6.58 THOUSANDS/ÂΜL (ref 1.85–7.62)
NEUTS SEG NFR BLD AUTO: 74 % (ref 43–75)
NRBC BLD AUTO-RTO: 0 /100 WBCS
PLATELET # BLD AUTO: 238 THOUSANDS/UL (ref 149–390)
PMV BLD AUTO: 10.1 FL (ref 8.9–12.7)
POTASSIUM SERPL-SCNC: 3.5 MMOL/L (ref 3.5–5.3)
RBC # BLD AUTO: 3.95 MILLION/UL (ref 3.81–5.12)
SODIUM SERPL-SCNC: 139 MMOL/L (ref 135–147)
WBC # BLD AUTO: 8.81 THOUSAND/UL (ref 4.31–10.16)

## 2023-08-25 PROCEDURE — 99024 POSTOP FOLLOW-UP VISIT: CPT | Performed by: OBSTETRICS & GYNECOLOGY

## 2023-08-25 PROCEDURE — 85025 COMPLETE CBC W/AUTO DIFF WBC: CPT | Performed by: OBSTETRICS & GYNECOLOGY

## 2023-08-25 PROCEDURE — 80048 BASIC METABOLIC PNL TOTAL CA: CPT | Performed by: OBSTETRICS & GYNECOLOGY

## 2023-08-25 RX ADMIN — METHYLENE BLUE 50 MG: 5 INJECTION INTRAVENOUS at 12:45

## 2023-08-25 RX ADMIN — ACETAMINOPHEN 650 MG: 325 TABLET, FILM COATED ORAL at 14:16

## 2023-08-25 RX ADMIN — ACETAMINOPHEN 650 MG: 325 TABLET, FILM COATED ORAL at 02:19

## 2023-08-25 RX ADMIN — IBUPROFEN 600 MG: 600 TABLET ORAL at 00:47

## 2023-08-25 RX ADMIN — OXYCODONE HYDROCHLORIDE 5 MG: 5 TABLET ORAL at 10:53

## 2023-08-25 RX ADMIN — CEFTRIAXONE SODIUM 2000 MG: 10 INJECTION, POWDER, FOR SOLUTION INTRAVENOUS at 21:48

## 2023-08-25 RX ADMIN — ACETAMINOPHEN 650 MG: 325 TABLET, FILM COATED ORAL at 20:50

## 2023-08-25 RX ADMIN — METRONIDAZOLE 500 MG: 500 INJECTION, SOLUTION INTRAVENOUS at 04:01

## 2023-08-25 RX ADMIN — IBUPROFEN 600 MG: 600 TABLET ORAL at 14:16

## 2023-08-25 RX ADMIN — METRONIDAZOLE 500 MG: 500 INJECTION, SOLUTION INTRAVENOUS at 20:51

## 2023-08-25 RX ADMIN — ACETAMINOPHEN 650 MG: 325 TABLET, FILM COATED ORAL at 10:30

## 2023-08-25 RX ADMIN — METRONIDAZOLE 500 MG: 500 INJECTION, SOLUTION INTRAVENOUS at 14:16

## 2023-08-25 RX ADMIN — IBUPROFEN 600 MG: 600 TABLET ORAL at 06:44

## 2023-08-25 NOTE — PROGRESS NOTES
Gynecology Progress note   Chepe Waller 62 y.o. female MRN: [id number]  Unit/Bed#: S -63 Encounter: [id number]    Assessment/Plan:    Ms. Yareli Guzmán is a 62 y.o. [place] [employer] Day: 2, admitted with pelvic pain and concern for vaginal cuff abscess after TLH/BSO on 8/1. S/P hysterectomy with oophorectomy  Assessment & Plan  - Estradiol patch for vasomotor symptoms    Combined abdominal and pelvic pain  Assessment & Plan  - S/p TLH and BSO 9/3/6935 with uncomplicated recovery thus far, abdominal incisions are healing well  - She is feeling better with initiation of IV antibiotics, and switching to p.o. can be considered  - After speaking with the patient this morning about her discharge, and odor of her urine, I am concerned for a vesicovaginal fistula. She reports that her urine has been foul-smelling, and she is leaking clear fluid. It is happening not just when she is coughing or sneezing but without warning all the time  - Monitor for signs/symptoms of infection  -Discussed with [facility] attending tampon test versus cystoscopy     To be discussed with Dr. Manoj Mercado during board signout      Subjective:    Chepe Waller has no current complaints. Pain is well controlled. He is voiding without issue, but reports that her urine has been foul-smelling. She is able to ambulate, eat, and is passing gas. She is feeling much better with initiation of antibiotics      Objective:  /59 (BP Location: Right arm)   Pulse 65   Temp 97.6 °F (36.4 °C) (Oral)   Resp 16   Wt 62.4 kg (137 lb 9.1 oz)   LMP 08/13/2021 (Approximate)   SpO2 97%   BMI 23.61 kg/m²     I/O last 3 completed shifts:   In: 1000 [IV Piggyback:1000]  Out: -   I/O this shift:  In: 360 [P.O.:360]  Out: -     Lab Results   Component Value Date    WBC 8.81 08/25/2023    HGB 11.8 08/25/2023    HCT 34.8 08/25/2023    MCV 88 08/25/2023     08/25/2023       Lab Results   Component Value Date    GLUCOSE 89 11/29/2017    CALCIUM 8.6 08/25/2023     11/29/2017    K 3.5 08/25/2023    CO2 29 08/25/2023     08/25/2023    BUN 10 08/25/2023    CREATININE 0.57 (L) 08/25/2023           Physical Exam  Constitutional:       Appearance: She is normal weight. HENT:      Mouth/Throat:      Mouth: Mucous membranes are moist.   Cardiovascular:      Rate and Rhythm: Normal rate. Pulmonary:      Effort: Pulmonary effort is normal.   Abdominal:      General: Abdomen is flat. Palpations: Abdomen is soft. Comments: 3 laparoscopy incisions are clean, mostly healed, and intact   Skin:     General: Skin is warm. Neurological:      Mental Status: She is alert and oriented to person, place, and time.    Psychiatric:         Behavior: Behavior normal.           Alyson Brandon DO  8/25/2023  6:54 AM

## 2023-08-25 NOTE — PLAN OF CARE
Problem: GASTROINTESTINAL - ADULT  Goal: Maintains adequate nutritional intake  Description: INTERVENTIONS:  - Monitor percentage of each meal consumed  - Identify factors contributing to decreased intake, treat as appropriate  - Assist with meals as needed  - Monitor I&O, weight, and lab values if indicated  - Obtain nutrition services referral as needed  Outcome: Progressing     Problem: PAIN - ADULT  Goal: Verbalizes/displays adequate comfort level or baseline comfort level  Description: Interventions:  - Encourage patient to monitor pain and request assistance  - Assess pain using appropriate pain scale  - Administer analgesics based on type and severity of pain and evaluate response  - Implement non-pharmacological measures as appropriate and evaluate response  - Consider cultural and social influences on pain and pain management  - Notify physician/advanced practitioner if interventions unsuccessful or patient reports new pain  Outcome: Progressing     Problem: SAFETY ADULT  Goal: Patient will remain free of falls  Description: INTERVENTIONS:  - Educate patient/family on patient safety including physical limitations  - Instruct patient to call for assistance with activity   - Consult OT/PT to assist with strengthening/mobility   - Keep Call bell within reach  - Keep bed low and locked with side rails adjusted as appropriate  - Keep care items and personal belongings within reach  - Initiate and maintain comfort rounds  - Make Fall Risk Sign visible to staff  - Apply yellow socks and bracelet for high fall risk patients  - Consider moving patient to room near nurses station  Outcome: Progressing  Goal: Maintain or return to baseline ADL function  Description: INTERVENTIONS:  -  Assess patient's ability to carry out ADLs; assess patient's baseline for ADL function and identify physical deficits which impact ability to perform ADLs (bathing, care of mouth/teeth, toileting, grooming, dressing, etc.)  - Assess/evaluate cause of self-care deficits   - Assess range of motion  - Assess patient's mobility; develop plan if impaired  - Assess patient's need for assistive devices and provide as appropriate  - Encourage maximum independence but intervene and supervise when necessary  - Involve family in performance of ADLs  - Assess for home care needs following discharge   - Consider OT consult to assist with ADL evaluation and planning for discharge  - Provide patient education as appropriate  Outcome: Progressing  Goal: Maintains/Returns to pre admission functional level  Description: INTERVENTIONS:  - Perform BMAT or MOVE assessment daily.   - Set and communicate daily mobility goal to care team and patient/family/caregiver.    - Collaborate with rehabilitation services on mobility goals if consulted  - Perform Range of Motion   - Reposition patient   - Dangle patient   - Stand patient   - Ambulate patient   - Out of bed to chair   - Out of bed for meals   - Out of bed for toileting  - Record patient progress and toleration of activity level   Outcome: Progressing     Problem: DISCHARGE PLANNING  Goal: Discharge to home or other facility with appropriate resources  Description: INTERVENTIONS:  - Identify barriers to discharge w/patient and caregiver  - Arrange for needed discharge resources and transportation as appropriate  - Identify discharge learning needs (meds, wound care, etc.)  - Arrange for interpretive services to assist at discharge as needed  - Refer to Case Management Department for coordinating discharge planning if the patient needs post-hospital services based on physician/advanced practitioner order or complex needs related to functional status, cognitive ability, or social support system  Outcome: Progressing

## 2023-08-25 NOTE — UTILIZATION REVIEW
Initial Clinical Review    Admission: Date/Time/Statement:   Admission Orders (From admission, onward)     Ordered        08/24/23 1541  Place in Observation  Once                      Orders Placed This Encounter   Procedures   • Place in Observation     Standing Status:   Standing     Number of Occurrences:   1     Order Specific Question:   Level of Care     Answer:   Med Surg [16]     ED Arrival Information     Expected   -    Arrival   8/24/2023 09:34    Acuity   Urgent            Means of arrival   Walk-In    Escorted by   Spouse    Service   OB/GYN    Admission type   Emergency            Arrival complaint   Abd pain           Chief Complaint   Patient presents with   • Abdominal Pain     Pt presents to the ED with reports of rectal pain and abd pain onset this morning. Last bm this morning, no complications. Generalized body aches. Nausea no vomiting. S/p total hysterectomy 8/1       Initial Presentation: 62 y.o. female presented to ED from home as observation for abdominal and pelvic pain. Patient c/o feverish with chills foul smelling urine increased urinary frequency. Of note, she had a TLH with BSO on 8/1 and has been recovering well without complications. She had some mild pink tinged discharge and spotting last week but otherwise denies vaginal bleeding. On exam : Speculum exam: no evidence of erythema on visual insepection, vaginal cuff normal appearing, no evidence of active bleeding No palpable evidence of cuff dehiscence on vaginal exam Tenderness to light palpation of right edge of vaginal cuff, mild fullness of posterior vagina, abdominal tenderness (periumbilical and RLQ). Plan IV abx Ceftriaxone and Metronidazole due to concern for possible evolving infection given tenderness to palpation of cuff and mild fullness  And supportive care.     ED Triage Vitals   Temperature Pulse Respirations Blood Pressure SpO2   08/24/23 0951 08/24/23 0951 08/24/23 0951 08/24/23 0951 08/24/23 0951   98.3 °F (36.8 °C) 84 16 115/57 96 %      Temp Source Heart Rate Source Patient Position - Orthostatic VS BP Location FiO2 (%)   08/24/23 0951 08/24/23 0951 08/24/23 0951 08/24/23 0951 --   Oral Monitor Sitting Right arm       Pain Score       08/24/23 1225       5          Wt Readings from Last 1 Encounters:   08/24/23 62.4 kg (137 lb 9.1 oz)     Additional Vital Signs:   Date/Time Temp Pulse Resp BP MAP (mmHg) SpO2 O2 Device Patient Position - Orthostatic VS   08/25/23 07:15:22 98 °F (36.7 °C) 71 17 102/64 77 93 % None (Room air) Lying   08/25/23 03:03:41 97.6 °F (36.4 °C) 65 16 109/59 76 97 % None (Room air) Lying   08/24/23 22:01:08 98.2 °F (36.8 °C) 74 -- 117/63 81 96 % -- --   08/24/23 19:51:40 99.3 °F (37.4 °C) 81 16 120/67 85 95 % None (Room air) Sitting   08/24/23 1434 -- 77 16 119/63 86 97 % None (Room air) Sitting   08/24/23 1230 -- 72 18 125/65 89 97 % -- --   08/24/23 1225 -- 70 16 125/65 89 97 % None (Room air) --                     Pertinent Labs/Diagnostic Test Results:   CT abdomen pelvis with contrast    (08/24 1238)      Postsurgical changes of recent hysterectomy with inflammatory stranding and a small amount of free fluid in the pelvis. Slight hyperdensity is seen within the vaginal cuff on precontrast imaging which increases postcontrast suggesting the possibility of    surgical dehiscence with active bleeding. A nonvisualized vesicovaginal or rectovaginal fistula could also be considered. Ureterovaginal fistula considered unlikely given the good visualization of the left ureter and presence of a ureteral jet.      Results from last 7 days   Lab Units 08/24/23  1747   SARS-COV-2  Negative     Results from last 7 days   Lab Units 08/25/23  0422 08/24/23  1014   WBC Thousand/uL 8.81 12.43*   HEMOGLOBIN g/dL 11.8 13.3   HEMATOCRIT % 34.8 39.2   PLATELETS Thousands/uL 238 268   NEUTROS ABS Thousands/µL 6.58 10.58*         Results from last 7 days   Lab Units 08/25/23  0422 08/24/23  1014   SODIUM mmol/L 139 139 POTASSIUM mmol/L 3.5 3.6   CHLORIDE mmol/L 105 102   CO2 mmol/L 29 30   ANION GAP mmol/L 5 7   BUN mg/dL 10 12   CREATININE mg/dL 0.57* 0.62   EGFR ml/min/1.73sq m 103 100   CALCIUM mg/dL 8.6 9.3     Results from last 7 days   Lab Units 08/24/23  1014   AST U/L 18   ALT U/L 16   ALK PHOS U/L 48   TOTAL PROTEIN g/dL 6.8   ALBUMIN g/dL 4.4   TOTAL BILIRUBIN mg/dL 0.80         Results from last 7 days   Lab Units 08/25/23  0422 08/24/23  1014   GLUCOSE RANDOM mg/dL 81 101     Results from last 7 days   Lab Units 08/24/23  1014   PROTIME seconds 13.5   INR  0.97   PTT seconds 27         Results from last 7 days   Lab Units 08/24/23  1014   PROCALCITONIN ng/ml <0.05     Results from last 7 days   Lab Units 08/24/23  1014   LACTIC ACID mmol/L 0.6       Results from last 7 days   Lab Units 08/24/23  1023   CLARITY UA  Turbid   COLOR UA  Light Yellow   SPEC GRAV UA  1.017   PH UA  5.5   GLUCOSE UA mg/dl Negative   KETONES UA mg/dl 20 (1+)*   BLOOD UA  Small*   PROTEIN UA mg/dl Trace*   NITRITE UA  Negative   BILIRUBIN UA  Negative   UROBILINOGEN UA (BE) mg/dl <2.0   LEUKOCYTES UA  Large*   WBC UA /hpf Innumerable*   RBC UA /hpf 2-4*   BACTERIA UA /hpf Occasional   EPITHELIAL CELLS WET PREP /hpf Innumerable*   MUCUS THREADS  Occasional*     Results from last 7 days   Lab Units 08/24/23  1747   INFLUENZA A PCR  Negative   INFLUENZA B PCR  Negative   RSV PCR  Negative     ED Treatment:   Medication Administration from 08/24/2023 0934 to 08/24/2023 1815       Date/Time Order Dose Route Action     08/24/2023 1023 EDT sodium chloride 0.9 % bolus 1,000 mL 1,000 mL Intravenous New Bag     08/24/2023 1023 EDT ketorolac (TORADOL) injection 15 mg 15 mg Intravenous Given     08/24/2023 1111 EDT iohexol (OMNIPAQUE) 350 MG/ML injection (SINGLE-DOSE) 80 mL 80 mL Intravenous Given     08/24/2023 1123 EDT iohexol (OMNIPAQUE) 350 MG/ML injection (SINGLE-DOSE) 40 mL 40 mL Intravenous Given     08/24/2023 1226 EDT acetaminophen (TYLENOL) tablet 650 mg 650 mg Oral Given     08/24/2023 1226 EDT morphine injection 4 mg 4 mg Intravenous Given        Past Medical History:   Diagnosis Date   • Abnormal uterine bleeding    • Anemia 2003   • Anesthesia complication     difficulty awakening and had trouble maintaing airway but didn't end up needing reintubation   • Anxiety    • Back pain    • Celiac disease     last assessed: 9/27/2016   • Chicken pox     AS PER PT   • Claustrophobia    • Fibroid    • GERD (gastroesophageal reflux disease)    • Kidney stone    • Raynaud's disease    • Wears reading eyeglasses      Present on Admission:  • Combined abdominal and pelvic pain      Admitting Diagnosis: Abdominal pain [R10.9]  Acute pain in female pelvis [R10.2]  Age/Sex: 62 y.o. female  Admission Orders:  Scheduled Medications:  acetaminophen, 650 mg, Oral, Q6H [facility]  cefTRIAXone, 2,000 mg, Intravenous, Q24H  ibuprofen, 600 mg, Oral, Q6H [facility]  methylene blue, 10 mL, Topical, Once  metroNIDAZOLE, 500 mg, Intravenous, Q8H  [START ON 8/28/2023] NON FORMULARY, 1 patch, Transdermal, Once per day on Mon Thu      Continuous IV Infusions:     PRN Meds:  ondansetron, 4 mg, Intravenous, Q6H PRN  oxyCODONE, 5 mg, Oral, Q4H PRN        None    Network Utilization Review Department  ATTENTION: Please call with any questions or concerns to [phone] and carefully listen to the prompts so that you are directed to the right person. All voicemails are confidential.  Cherylene Kelch all requests for admission clinical reviews, approved or denied determinations and any other requests to dedicated fax number below belonging to the campus where the patient is receiving treatment.  List of dedicated fax numbers for the Facilities:  [place] DENIALS (Administrative/Medical Necessity) [phone] [address] (Maternity/NICU/Pediatrics) [phone] [address] [phone]   [facility] [phone] [address] [address] [facility] [address] [address] [facility] [phone]

## 2023-08-25 NOTE — PROGRESS NOTES
PROCEDURE:  MONTANEZ PLACEMENT    A tampon was placed by the patient vaginally. A 16Fr montanez was placed using sterile technique. It was backfilled with 300 cc of a methylene blue and saline solution. The patient will walk around and I will return in an hour to remove the tampon. Addendum 1330: Tampon was removed and no blue dye was seen. Montanez was removed without complication. Plan to continue IV abx x48 hrs.         Espinoza Hannah, DO

## 2023-08-26 VITALS
TEMPERATURE: 97.4 F | RESPIRATION RATE: 18 BRPM | SYSTOLIC BLOOD PRESSURE: 127 MMHG | HEART RATE: 68 BPM | WEIGHT: 137.57 LBS | BODY MASS INDEX: 23.61 KG/M2 | DIASTOLIC BLOOD PRESSURE: 78 MMHG | OXYGEN SATURATION: 97 %

## 2023-08-26 PROBLEM — Z13.29 SCREENING FOR HYPOTHYROIDISM: Status: RESOLVED | Noted: 2019-07-24 | Resolved: 2023-08-26

## 2023-08-26 PROBLEM — Z00.00 ROUTINE GENERAL MEDICAL EXAMINATION AT A HEALTH CARE FACILITY: Status: RESOLVED | Noted: 2019-07-24 | Resolved: 2023-08-26

## 2023-08-26 PROBLEM — Z13.220 SCREENING FOR HYPERLIPIDEMIA: Status: RESOLVED | Noted: 2019-07-24 | Resolved: 2023-08-26

## 2023-08-26 PROBLEM — Z23 NEED FOR INFLUENZA VACCINATION: Status: RESOLVED | Noted: 2020-09-09 | Resolved: 2023-08-26

## 2023-08-26 PROBLEM — Z12.11 COLON CANCER SCREENING: Status: RESOLVED | Noted: 2019-07-24 | Resolved: 2023-08-26

## 2023-08-26 PROBLEM — Z12.31 ENCOUNTER FOR SCREENING MAMMOGRAM FOR BREAST CANCER: Status: RESOLVED | Noted: 2019-07-24 | Resolved: 2023-08-26

## 2023-08-26 PROBLEM — Z13.6 SCREENING FOR HYPERTENSION: Status: RESOLVED | Noted: 2019-07-24 | Resolved: 2023-08-26

## 2023-08-26 LAB
ANION GAP SERPL CALCULATED.3IONS-SCNC: 5 MMOL/L
BUN SERPL-MCNC: 10 MG/DL (ref 5–25)
CALCIUM SERPL-MCNC: 8.7 MG/DL (ref 8.4–10.2)
CHLORIDE SERPL-SCNC: 106 MMOL/L (ref 96–108)
CO2 SERPL-SCNC: 32 MMOL/L (ref 21–32)
CREAT SERPL-MCNC: 0.59 MG/DL (ref 0.6–1.3)
ERYTHROCYTE [DISTWIDTH] IN BLOOD BY AUTOMATED COUNT: 11.9 % (ref 11.6–15.1)
GFR SERPL CREATININE-BSD FRML MDRD: 102 ML/MIN/1.73SQ M
GLUCOSE SERPL-MCNC: 89 MG/DL (ref 65–140)
HCT VFR BLD AUTO: 34.8 % (ref 34.8–46.1)
HGB BLD-MCNC: 11.9 G/DL (ref 11.5–15.4)
MCH RBC QN AUTO: 30.3 PG (ref 26.8–34.3)
MCHC RBC AUTO-ENTMCNC: 34.2 G/DL (ref 31.4–37.4)
MCV RBC AUTO: 89 FL (ref 82–98)
PLATELET # BLD AUTO: 251 THOUSANDS/UL (ref 149–390)
PMV BLD AUTO: 10.2 FL (ref 8.9–12.7)
POTASSIUM SERPL-SCNC: 3.9 MMOL/L (ref 3.5–5.3)
RBC # BLD AUTO: 3.93 MILLION/UL (ref 3.81–5.12)
SODIUM SERPL-SCNC: 143 MMOL/L (ref 135–147)
WBC # BLD AUTO: 5.2 THOUSAND/UL (ref 4.31–10.16)

## 2023-08-26 PROCEDURE — 85027 COMPLETE CBC AUTOMATED: CPT | Performed by: OBSTETRICS & GYNECOLOGY

## 2023-08-26 PROCEDURE — 99024 POSTOP FOLLOW-UP VISIT: CPT | Performed by: OBSTETRICS & GYNECOLOGY

## 2023-08-26 PROCEDURE — 80048 BASIC METABOLIC PNL TOTAL CA: CPT | Performed by: OBSTETRICS & GYNECOLOGY

## 2023-08-26 RX ORDER — SULFAMETHOXAZOLE AND TRIMETHOPRIM 800; 160 MG/1; MG/1
1 TABLET ORAL EVERY 12 HOURS SCHEDULED
Status: DISCONTINUED | OUTPATIENT
Start: 2023-08-26 | End: 2023-08-26 | Stop reason: HOSPADM

## 2023-08-26 RX ORDER — METRONIDAZOLE 500 MG/1
500 TABLET ORAL EVERY 12 HOURS SCHEDULED
Qty: 27 TABLET | Refills: 0 | OUTPATIENT
Start: 2023-08-26 | End: 2023-09-05

## 2023-08-26 RX ORDER — METOCLOPRAMIDE HYDROCHLORIDE 5 MG/ML
10 INJECTION INTRAMUSCULAR; INTRAVENOUS EVERY 6 HOURS PRN
Status: DISCONTINUED | OUTPATIENT
Start: 2023-08-26 | End: 2023-08-26

## 2023-08-26 RX ORDER — ONDANSETRON 4 MG/1
4 TABLET, FILM COATED ORAL EVERY 8 HOURS PRN
Qty: 20 TABLET | Refills: 0 | Status: SHIPPED | OUTPATIENT
Start: 2023-08-26 | End: 2023-09-09

## 2023-08-26 RX ORDER — SULFAMETHOXAZOLE AND TRIMETHOPRIM 800; 160 MG/1; MG/1
1 TABLET ORAL EVERY 12 HOURS SCHEDULED
Qty: 27 TABLET | Refills: 0 | OUTPATIENT
Start: 2023-08-26 | End: 2023-09-05

## 2023-08-26 RX ORDER — METRONIDAZOLE 500 MG/1
500 TABLET ORAL EVERY 12 HOURS SCHEDULED
Status: DISCONTINUED | OUTPATIENT
Start: 2023-08-26 | End: 2023-08-26 | Stop reason: HOSPADM

## 2023-08-26 RX ADMIN — METRONIDAZOLE 500 MG: 500 INJECTION, SOLUTION INTRAVENOUS at 04:13

## 2023-08-26 RX ADMIN — ACETAMINOPHEN 650 MG: 325 TABLET, FILM COATED ORAL at 04:54

## 2023-08-26 RX ADMIN — METRONIDAZOLE 500 MG: 500 TABLET ORAL at 08:40

## 2023-08-26 RX ADMIN — ONDANSETRON 4 MG: 2 INJECTION INTRAMUSCULAR; INTRAVENOUS at 02:38

## 2023-08-26 RX ADMIN — SULFAMETHOXAZOLE AND TRIMETHOPRIM 1 TABLET: 800; 160 TABLET ORAL at 08:40

## 2023-08-26 RX ADMIN — IBUPROFEN 600 MG: 600 TABLET ORAL at 08:40

## 2023-08-26 NOTE — PLAN OF CARE
Problem: GENITOURINARY - ADULT  Goal: Maintains or returns to baseline urinary function  Description: INTERVENTIONS:  - Assess urinary function  - Encourage oral fluids to ensure adequate hydration if ordered  - Administer IV fluids as ordered to ensure adequate hydration  - Administer ordered medications as needed  - Offer frequent toileting  - Follow urinary retention protocol if ordered  Outcome: Progressing     Problem: PAIN - ADULT  Goal: Verbalizes/displays adequate comfort level or baseline comfort level  Description: Interventions:  - Encourage patient to monitor pain and request assistance  - Assess pain using appropriate pain scale  - Administer analgesics based on type and severity of pain and evaluate response  - Implement non-pharmacological measures as appropriate and evaluate response  - Consider cultural and social influences on pain and pain management  - Notify physician/advanced practitioner if interventions unsuccessful or patient reports new pain  Outcome: Progressing     Problem: SAFETY ADULT  Goal: Patient will remain free of falls  Description: INTERVENTIONS:  - Educate patient/family on patient safety including physical limitations  - Instruct patient to call for assistance with activity   - Consult OT/PT to assist with strengthening/mobility   - Keep Call bell within reach  - Keep bed low and locked with side rails adjusted as appropriate  - Keep care items and personal belongings within reach  - Initiate and maintain comfort rounds  - Make Fall Risk Sign visible to staff  - Apply yellow socks and bracelet for high fall risk patients  - Consider moving patient to room near nurses station  Outcome: Progressing  Goal: Maintain or return to baseline ADL function  Description: INTERVENTIONS:  -  Assess patient's ability to carry out ADLs; assess patient's baseline for ADL function and identify physical deficits which impact ability to perform ADLs (bathing, care of mouth/teeth, toileting, grooming, dressing, etc.)  - Assess/evaluate cause of self-care deficits   - Assess range of motion  - Assess patient's mobility; develop plan if impaired  - Assess patient's need for assistive devices and provide as appropriate  - Encourage maximum independence but intervene and supervise when necessary  - Involve family in performance of ADLs  - Assess for home care needs following discharge   - Consider OT consult to assist with ADL evaluation and planning for discharge  - Provide patient education as appropriate  Outcome: Progressing  Goal: Maintains/Returns to pre admission functional level  Description: INTERVENTIONS:  - Perform BMAT or MOVE assessment daily.   - Set and communicate daily mobility goal to care team and patient/family/caregiver.    - Collaborate with rehabilitation services on mobility goals if consulted  - Perform Range of Motion   - Reposition patient   - Dangle patient   - Stand patient   - Ambulate patient   - Out of bed to chair  - Out of bed for meals   - Out of bed for toileting  - Record patient progress and toleration of activity level   Outcome: Progressing     Problem: DISCHARGE PLANNING  Goal: Discharge to home or other facility with appropriate resources  Description: INTERVENTIONS:  - Identify barriers to discharge w/patient and caregiver  - Arrange for needed discharge resources and transportation as appropriate  - Identify discharge learning needs (meds, wound care, etc.)  - Arrange for interpretive services to assist at discharge as needed  - Refer to Case Management Department for coordinating discharge planning if the patient needs post-hospital services based on physician/advanced practitioner order or complex needs related to functional status, cognitive ability, or social support system  Outcome: Progressing

## 2023-08-26 NOTE — PROGRESS NOTES
Gynecology Progress note   Kris Andrade 62 y.o. female MRN: [id number]  Unit/Bed#: S -09 Encounter: [id number]    Assessment/Plan:    Ms. Radha Mora is a 62 y.o. [address] Day: 3, admitted with abdominal pain and pelvic pain 3 weeks postoperatively from a TLH/BSO. S/P hysterectomy with oophorectomy  Assessment & Plan  - Estradiol patch for vasomotor symptoms    Celiac disease  Assessment & Plan  Has gluten-free diet ordered    * Combined abdominal and pelvic pain  Assessment & Plan  - S/p TLH and BSO 8/7/6695 with uncomplicated recovery thus far, abdominal incisions are healing well  - She is feeling better with initiation of IV antibiotics, and will plan to transition to PO today  - Monitor for signs/symptoms of infection   - Negative blue dye tampon test yesterday, vesico vaginal fistula ruled out       D/w Dr. Suhas Stroud    Subjective:    Kris Andrade has no current complaints. Pain is well controlled. She is voiding well, ambulating, and passing gas. She is tolerating p.o. She did have some hot flashes overnight as she was unable to get her estradiol patch    She is looking forward to discharge    Objective:  /78   Pulse 68   Temp (!) 97.4 °F (36.3 °C)   Resp 18   Wt 62.4 kg (137 lb 9.1 oz)   LMP 08/13/2021 (Approximate)   SpO2 97%   BMI 23.61 kg/m²     I/O last 3 completed shifts: In: 600 [P.O.:600]  Out: -   No intake/output data recorded. Lab Results   Component Value Date    WBC 5.20 08/26/2023    HGB 11.9 08/26/2023    HCT 34.8 08/26/2023    MCV 89 08/26/2023     08/26/2023       Lab Results   Component Value Date    GLUCOSE 89 11/29/2017    CALCIUM 8.7 08/26/2023     11/29/2017    K 3.9 08/26/2023    CO2 32 08/26/2023     08/26/2023    BUN 10 08/26/2023    CREATININE 0.59 (L) 08/26/2023       GEN: The patient was alert and oriented x3, pleasant well-appearing female in no acute distress.    CV: Regular rate  PULM: nonlabored respirations  ABD: soft, non-tender, well healed laparoscopy scars  MSK: Normal gait  Skin: warm, dry  Neuro: no focal deficits  Psych: normal affect and judgement, cooperative      Augustin Boone DO  8/26/2023  7:45 AM

## 2023-08-27 NOTE — DISCHARGE SUMMARY
Discharge Summary - Adolph Rodriguez 62 y.o. female MRN: [id number]    Unit/Bed#: S -12 Encounter: [id number]    Admission Date: 8/24/2023     Discharge Date: 8/26/23    OBGYN Practice: [facility] Course:   Adolph Rodriguez is a 62 y.o. [id number] who was admitted concern for vaginal cuff cellulitis versus abscess, it responded well to IV antibiotics and white count trended down over hospital admission. Milford Nyhan had a blue dye tampon test to rule out vesicovaginal fistula which was negative. She remained afebrile.   Plan to discharge home with 14 days of oral Flagyl and Bactrim      Discharge Problem List by Issue:   S/P hysterectomy with oophorectomy  Assessment & Plan  - Estradiol patch for vasomotor symptoms    Celiac disease  Assessment & Plan  Has gluten-free diet ordered    * Combined abdominal and pelvic pain  Assessment & Plan  - S/p TLH and BSO 8/8/0760 with uncomplicated recovery thus far, abdominal incisions are healing well  - She is feeling better with initiation of IV antibiotics, and will plan to transition to PO today  - Monitor for signs/symptoms of infection   - Negative blue dye tampon test yesterday, vesico vaginal fistula ruled out         Lab Results:   Lab Results   Component Value Date    WBC 5.20 08/26/2023    HGB 11.9 08/26/2023    HCT 34.8 08/26/2023    MCV 89 08/26/2023     08/26/2023     Lab Results   Component Value Date    GLUCOSE 89 11/29/2017    CALCIUM 8.7 08/26/2023     11/29/2017    K 3.9 08/26/2023    CO2 32 08/26/2023     08/26/2023    BUN 10 08/26/2023    CREATININE 0.59 (L) 08/26/2023       Disposition: Home    Planned Readmission: No    Discharge Medications:   Please see AVS    Discharge instructions: Please see AVS    Follow Up:  - Follow up in 2 weeks for outpatient OBGYN visit    Dr. Va Giraldo Gynecology   8/27/2023  10:40 AM

## 2023-08-28 ENCOUNTER — TRANSITIONAL CARE MANAGEMENT (OUTPATIENT)
Dept: FAMILY MEDICINE CLINIC | Facility: CLINIC | Age: 58
End: 2023-08-28

## 2023-09-05 ENCOUNTER — HOSPITAL ENCOUNTER (EMERGENCY)
Facility: HOSPITAL | Age: 58
Discharge: HOME | End: 2023-09-05
Attending: EMERGENCY MEDICINE | Admitting: EMERGENCY MEDICINE
Payer: COMMERCIAL

## 2023-09-05 ENCOUNTER — TELEPHONE (OUTPATIENT)
Dept: OBGYN CLINIC | Facility: CLINIC | Age: 58
End: 2023-09-05

## 2023-09-05 VITALS
OXYGEN SATURATION: 96 % | SYSTOLIC BLOOD PRESSURE: 103 MMHG | RESPIRATION RATE: 18 BRPM | DIASTOLIC BLOOD PRESSURE: 59 MMHG | HEART RATE: 76 BPM | TEMPERATURE: 98.4 F

## 2023-09-05 DIAGNOSIS — L27.0 DRUG-INDUCED SKIN RASH: Primary | ICD-10-CM

## 2023-09-05 DIAGNOSIS — R74.01 TRANSAMINITIS: ICD-10-CM

## 2023-09-05 DIAGNOSIS — R50.2 DRUG INDUCED FEVER: ICD-10-CM

## 2023-09-05 LAB
ALBUMIN SERPL BCP-MCNC: 4.5 G/DL (ref 3.5–5)
ALP SERPL-CCNC: 62 U/L (ref 34–104)
ALT SERPL W P-5'-P-CCNC: 92 U/L (ref 7–52)
ANION GAP SERPL CALCULATED.3IONS-SCNC: 6 MMOL/L
APPEARANCE CSF: NORMAL
APTT PPP: 27 SECONDS (ref 23–37)
AST SERPL W P-5'-P-CCNC: 98 U/L (ref 13–39)
BASOPHILS # BLD AUTO: 0.02 THOUSANDS/ÂΜL (ref 0–0.1)
BASOPHILS NFR BLD AUTO: 0 % (ref 0–1)
BASOPHILS NFR CSF MANUAL: 1 %
BILIRUB SERPL-MCNC: 0.3 MG/DL (ref 0.2–1)
BUN SERPL-MCNC: 14 MG/DL (ref 5–25)
C GATTII+NEOFOR DNA CSF QL NAA+NON-PROBE: NOT DETECTED
CALCIUM SERPL-MCNC: 9.6 MG/DL (ref 8.4–10.2)
CHLORIDE SERPL-SCNC: 97 MMOL/L (ref 96–108)
CMV DNA CSF QL NAA+NON-PROBE: NOT DETECTED
CO2 SERPL-SCNC: 33 MMOL/L (ref 21–32)
CREAT SERPL-MCNC: 0.92 MG/DL (ref 0.6–1.3)
E COLI K1 DNA CSF QL NAA+NON-PROBE: NOT DETECTED
EOSINOPHIL # BLD AUTO: 0.16 THOUSAND/ÂΜL (ref 0–0.61)
EOSINOPHIL NFR BLD AUTO: 3 % (ref 0–6)
ERYTHROCYTE [DISTWIDTH] IN BLOOD BY AUTOMATED COUNT: 12.4 % (ref 11.6–15.1)
EV RNA CSF QL NAA+NON-PROBE: NOT DETECTED
GFR SERPL CREATININE-BSD FRML MDRD: 69 ML/MIN/1.73SQ M
GLUCOSE CSF-MCNC: 69 MG/DL (ref 40–70)
GLUCOSE SERPL-MCNC: 136 MG/DL (ref 65–140)
GP B STREP DNA CSF QL NAA+NON-PROBE: NOT DETECTED
GRAM STN SPEC: NORMAL
GRAM STN SPEC: NORMAL
HAEM INFLU DNA CSF QL NAA+NON-PROBE: NOT DETECTED
HCT VFR BLD AUTO: 44.1 % (ref 34.8–46.1)
HGB BLD-MCNC: 14.6 G/DL (ref 11.5–15.4)
HHV6 DNA CSF QL NAA+NON-PROBE: NOT DETECTED
HSV1 DNA CSF QL NAA+NON-PROBE: NOT DETECTED
HSV2 DNA CSF QL NAA+NON-PROBE: NOT DETECTED
IMM GRANULOCYTES # BLD AUTO: 0.01 THOUSAND/UL (ref 0–0.2)
IMM GRANULOCYTES NFR BLD AUTO: 0 % (ref 0–2)
INR PPP: 0.96 (ref 0.84–1.19)
L MONOCYTOG DNA CSF QL NAA+NON-PROBE: NOT DETECTED
LACTATE SERPL-SCNC: 1.4 MMOL/L (ref 0.5–2)
LYMPHOCYTES # BLD AUTO: 0.37 THOUSANDS/ÂΜL (ref 0.6–4.47)
LYMPHOCYTES NFR BLD AUTO: 7 % (ref 14–44)
LYMPHOCYTES NFR CSF MANUAL: 58 %
MCH RBC QN AUTO: 29.7 PG (ref 26.8–34.3)
MCHC RBC AUTO-ENTMCNC: 33.1 G/DL (ref 31.4–37.4)
MCV RBC AUTO: 90 FL (ref 82–98)
MONOCYTES # BLD AUTO: 0.25 THOUSAND/ÂΜL (ref 0.17–1.22)
MONOCYTES NFR BLD AUTO: 5 % (ref 4–12)
MONOS+MACROS CSF MANUAL: 40 %
N MEN DNA CSF QL NAA+NON-PROBE: NOT DETECTED
NEUTROPHILS # BLD AUTO: 4.45 THOUSANDS/ÂΜL (ref 1.85–7.62)
NEUTROPHILS NFR CSF MANUAL: 1 %
NEUTS SEG NFR BLD AUTO: 85 % (ref 43–75)
NRBC BLD AUTO-RTO: 0 /100 WBCS
PARECHOVIRUS A RNA CSF QL NAA+NON-PROBE: NOT DETECTED
PLATELET # BLD AUTO: 256 THOUSANDS/UL (ref 149–390)
PMV BLD AUTO: 10.2 FL (ref 8.9–12.7)
POTASSIUM SERPL-SCNC: 3.8 MMOL/L (ref 3.5–5.3)
PROCALCITONIN SERPL-MCNC: 0.27 NG/ML
PROT CSF-MCNC: 39 MG/DL (ref 15–45)
PROT SERPL-MCNC: 7.5 G/DL (ref 6.4–8.4)
PROTHROMBIN TIME: 13.4 SECONDS (ref 11.6–14.5)
RBC # BLD AUTO: 4.92 MILLION/UL (ref 3.81–5.12)
RBC # CSF MANUAL: 2 UL (ref 0–10)
RBC # CSF MANUAL: 96 UL (ref 0–10)
S PNEUM DNA CSF QL NAA+NON-PROBE: NOT DETECTED
SODIUM SERPL-SCNC: 136 MMOL/L (ref 135–147)
TOTAL CELLS COUNTED BLD: NO
TOTAL CELLS COUNTED SPEC: 100
TUBE # CSF: 4
VZV DNA CSF QL NAA+NON-PROBE: NOT DETECTED
WBC # BLD AUTO: 5.26 THOUSAND/UL (ref 4.31–10.16)
WBC # CSF AUTO: 1 /UL (ref 0–5)

## 2023-09-05 PROCEDURE — 87483 CNS DNA AMP PROBE TYPE 12-25: CPT

## 2023-09-05 PROCEDURE — 89050 BODY FLUID CELL COUNT: CPT

## 2023-09-05 PROCEDURE — 99285 EMERGENCY DEPT VISIT HI MDM: CPT | Performed by: EMERGENCY MEDICINE

## 2023-09-05 PROCEDURE — 96374 THER/PROPH/DIAG INJ IV PUSH: CPT

## 2023-09-05 PROCEDURE — 83605 ASSAY OF LACTIC ACID: CPT | Performed by: EMERGENCY MEDICINE

## 2023-09-05 PROCEDURE — 89051 BODY FLUID CELL COUNT: CPT

## 2023-09-05 PROCEDURE — 99283 EMERGENCY DEPT VISIT LOW MDM: CPT

## 2023-09-05 PROCEDURE — 82945 GLUCOSE OTHER FLUID: CPT

## 2023-09-05 PROCEDURE — 84145 PROCALCITONIN (PCT): CPT | Performed by: EMERGENCY MEDICINE

## 2023-09-05 PROCEDURE — 80053 COMPREHEN METABOLIC PANEL: CPT | Performed by: EMERGENCY MEDICINE

## 2023-09-05 PROCEDURE — 36415 COLL VENOUS BLD VENIPUNCTURE: CPT | Performed by: EMERGENCY MEDICINE

## 2023-09-05 PROCEDURE — 85730 THROMBOPLASTIN TIME PARTIAL: CPT | Performed by: EMERGENCY MEDICINE

## 2023-09-05 PROCEDURE — 87040 BLOOD CULTURE FOR BACTERIA: CPT | Performed by: EMERGENCY MEDICINE

## 2023-09-05 PROCEDURE — 62270 DX LMBR SPI PNXR: CPT | Performed by: EMERGENCY MEDICINE

## 2023-09-05 PROCEDURE — 85610 PROTHROMBIN TIME: CPT | Performed by: EMERGENCY MEDICINE

## 2023-09-05 PROCEDURE — 84157 ASSAY OF PROTEIN OTHER: CPT

## 2023-09-05 PROCEDURE — 87070 CULTURE OTHR SPECIMN AEROBIC: CPT

## 2023-09-05 PROCEDURE — 85025 COMPLETE CBC W/AUTO DIFF WBC: CPT | Performed by: EMERGENCY MEDICINE

## 2023-09-05 RX ORDER — AMOXICILLIN AND CLAVULANATE POTASSIUM 875; 125 MG/1; MG/1
1 TABLET, FILM COATED ORAL 2 TIMES DAILY
Qty: 14 TABLET | Refills: 0 | Status: SHIPPED | OUTPATIENT
Start: 2023-09-05 | End: 2023-09-12

## 2023-09-05 RX ORDER — KETOROLAC TROMETHAMINE 30 MG/ML
15 INJECTION, SOLUTION INTRAMUSCULAR; INTRAVENOUS ONCE
Status: COMPLETED | OUTPATIENT
Start: 2023-09-05 | End: 2023-09-05

## 2023-09-05 RX ORDER — AMOXICILLIN AND CLAVULANATE POTASSIUM 875; 125 MG/1; MG/1
1 TABLET, FILM COATED ORAL ONCE
Status: COMPLETED | OUTPATIENT
Start: 2023-09-05 | End: 2023-09-05

## 2023-09-05 RX ORDER — LIDOCAINE HYDROCHLORIDE 20 MG/ML
5 INJECTION, SOLUTION EPIDURAL; INFILTRATION; INTRACAUDAL; PERINEURAL ONCE
Status: COMPLETED | OUTPATIENT
Start: 2023-09-05 | End: 2023-09-05

## 2023-09-05 RX ADMIN — AMOXICILLIN AND CLAVULANATE POTASSIUM 1 TABLET: 875; 125 TABLET, FILM COATED ORAL at 16:54

## 2023-09-05 RX ADMIN — LIDOCAINE HYDROCHLORIDE 5 ML: 20 INJECTION, SOLUTION EPIDURAL; INFILTRATION; INTRACAUDAL; PERINEURAL at 15:06

## 2023-09-05 RX ADMIN — KETOROLAC TROMETHAMINE 15 MG: 30 INJECTION, SOLUTION INTRAMUSCULAR; INTRAVENOUS at 15:52

## 2023-09-05 NOTE — TELEPHONE ENCOUNTER
Patient called, she had a hyster on 9/1 and was readmitted on 8/24 and sent home on PO and vaginal antibiotics. She states she had a fever of 101 last night and now is covered with a rash on her entire body. Discussed with Dr. Giovana Casillas. Patient needs to be seen in the ED.  Patient aware of recommendation

## 2023-09-05 NOTE — ED PROVIDER NOTES
History  Chief Complaint   Patient presents with   • Rash     Pt reports full body rash and fever last night 101.7. Pt has been on bactrum and flagyl for a week for post-op infection. Took 1000 mg of tylenol at 0100. Pt reports nausea and diarrhea. History provided by:  Patient  Rash  Location:  Full body  Severity:  Moderate  Onset quality:  Gradual  Duration:  1 day  Timing:  Constant  Progression:  Worsening  Chronicity:  New  Context comment:  Hysterectomy 1 month ago, seen in ED had vaginal cuff cellulitis, is currently on extended course of Bactrim and Flagyl now has rash, 101.7 fever last night, headache, neck pain  Relieved by:  Nothing  Worsened by:  Nothing  Associated symptoms: fever, headaches and nausea    Associated symptoms: no abdominal pain, no diarrhea, no shortness of breath, no sore throat, not vomiting and not wheezing    Associated symptoms comment:  Headache neck pain nausea.,  Headache and neck pain severely worsened with any forward flexion or extension of the cervical spine. Prior to Admission Medications   Prescriptions Last Dose Informant Patient Reported? Taking?    ALPRAZolam (Xanax) 0.25 mg tablet  Self No No   Sig: Take 1 tablet (0.25 mg total) by mouth 3 (three) times a day as needed (for flying)   B-D INTEGRA SYRINGE 23G X 1" 3 ML MISC  Self No No   Sig: USE EVERY 30 (THIRTY) DAYS   Cholecalciferol (VITAMIN D3 PO)  Self Yes No   Sig: Take 1 capsule by mouth in the morning   Multiple Vitamins-Minerals (ZINC PO)  Self Yes No   Sig: Take 1 tablet by mouth in the morning   acetaminophen (TYLENOL) 650 mg CR tablet  Self No No   Sig: Take 1 tablet (650 mg total) by mouth every 8 (eight) hours as needed for mild pain   Patient taking differently: Take 650 mg by mouth every 8 (eight) hours as needed for mild pain Taking 500mg every 12 hours   cyanocobalamin 1,000 mcg/mL  Self No No   Sig: INJECT 1 ML (1,000 MCG TOTAL) INTO A MUSCLE EVERY 30 (THIRTY) DAYS   estradiol (Vivelle-Dot) 0.05 MG/24HR  Self No No   Sig: Place 1 patch on the skin 2 (two) times a week   ibuprofen (MOTRIN) 600 mg tablet  Self No No   Sig: Take 1 tablet (600 mg total) by mouth every 6 (six) hours as needed for mild pain   metroNIDAZOLE (FLAGYL) 500 mg tablet  Self No No   Sig: Take 1 tablet (500 mg total) by mouth every 12 (twelve) hours for 27 doses   multivitamin (THERAGRAN) TABS  Self Yes No   Sig: Take 1 tablet by mouth daily   ondansetron (ZOFRAN) 4 mg tablet  Self No No   Sig: Take 1 tablet (4 mg total) by mouth every 8 (eight) hours as needed for nausea or vomiting for up to 14 days   sulfamethoxazole-trimethoprim (BACTRIM DS) 800-160 mg per tablet  Self No No   Sig: Take 1 tablet by mouth every 12 (twelve) hours for 27 doses      Facility-Administered Medications: None       Past Medical History:   Diagnosis Date   • Abnormal uterine bleeding    • Anemia 2003   • Anesthesia complication     difficulty awakening and had trouble maintaing airway but didn't end up needing reintubation   • Anxiety    • Back pain    • Celiac disease     last assessed: 9/27/2016   • Chicken pox     AS PER PT   • Claustrophobia    • Fibroid    • GERD (gastroesophageal reflux disease)    • Kidney stone    • Raynaud's disease    • Wears reading eyeglasses        Past Surgical History:   Procedure Laterality Date   • COLONOSCOPY     • ENDOMETRIAL ABLATION     • WA CYSTOURETHROSCOPY N/A 8/1/2023    Procedure: CYSTOSCOPY;  Surgeon: Mingo Archer MD;  [facility];  Service: Gynecology   • WA LAPS TOTAL HYSTERECT 250 GM/< W/RMVL TUBE/OVARY Bilateral 8/1/2023    Procedure: HYSTERECTOMY LAPAROSCOPIC TOTAL ([facility]) WITH SALPINGO-OOPHERECTOMY;  Surgeon: Mingo Archer MD;  Location[facility];  Service: Gynecology   • ROTATOR CUFF REPAIR Left 2017       Family History   Problem Relation Age of Onset   • Heart disease Mother         Pacemaker and defibrillator CHF   • Hypertension Mother    • Cancer Father         Bladder and kidney   • Dementia Father    • Depression Father             • Heart disease Father         Defibrillator. Quadruple bypass at 58 yrs   • Stroke Father    • Heart disease Family    • Diabetes Family    • Cancer Family    • Substance Abuse Neg Hx    • Mental illness Neg Hx      I have reviewed and agree with the history as documented. E-Cigarette/Vaping   • E-Cigarette Use Never User      E-Cigarette/Vaping Substances   • Nicotine No    • THC No    • CBD No    • Flavoring No    • Other No    • Unknown No      Social History     Tobacco Use   • Smoking status: Former   • Smokeless tobacco: Never   • Tobacco comments:     Quit    Vaping Use   • Vaping Use: Never used   Substance Use Topics   • Alcohol use: Not Currently     Comment: social   • Drug use: Not Currently       Review of Systems   Constitutional: Positive for fever. Negative for activity change, chills and diaphoresis. HENT: Negative for congestion, sinus pressure and sore throat. Eyes: Negative for pain and visual disturbance. Respiratory: Negative for cough, chest tightness, shortness of breath, wheezing and stridor. Cardiovascular: Negative for chest pain and palpitations. Gastrointestinal: Positive for nausea. Negative for abdominal distention, abdominal pain, constipation, diarrhea and vomiting. Genitourinary: Negative for dysuria and frequency. Musculoskeletal: Negative for neck pain and neck stiffness. Skin: Positive for rash. Neurological: Positive for headaches. Negative for dizziness, speech difficulty, light-headedness and numbness. Physical Exam  Physical Exam  Vitals reviewed. Constitutional:       General: She is not in acute distress. Appearance: She is well-developed. She is not diaphoretic. HENT:      Head: Normocephalic and atraumatic. Right Ear: External ear normal.      Left Ear: External ear normal.      Nose: Nose normal.   Eyes:      General:         Right eye: No discharge. Left eye: No discharge. Pupils: Pupils are equal, round, and reactive to light. Neck:      Trachea: No tracheal deviation. Comments: Decreased range of motion of the cervical spine secondary to discomfort/stiffness.,  Makes her headache significantly worse  Cardiovascular:      Rate and Rhythm: Normal rate and regular rhythm. Heart sounds: Normal heart sounds. No murmur heard. Pulmonary:      Effort: Pulmonary effort is normal. No respiratory distress. Breath sounds: Normal breath sounds. No stridor. Abdominal:      General: There is no distension. Palpations: Abdomen is soft. Tenderness: There is no abdominal tenderness. There is no guarding or rebound. Musculoskeletal:         General: Normal range of motion. Skin:     General: Skin is warm and dry. Coloration: Skin is not pale. Findings: Rash ( Diffuse maculopapular rash over her back and arms) present. No erythema. Neurological:      General: No focal deficit present. Mental Status: She is alert and oriented to person, place, and time.          Vital Signs  ED Triage Vitals   Temperature Pulse Respirations Blood Pressure SpO2   09/05/23 1210 09/05/23 1210 09/05/23 1210 09/05/23 1210 09/05/23 1210   98.4 °F (36.9 °C) 93 18 118/59 96 %      Temp Source Heart Rate Source Patient Position - Orthostatic VS BP Location FiO2 (%)   09/05/23 1210 09/05/23 1210 09/05/23 1210 09/05/23 1210 --   Oral Monitor Sitting Right arm       Pain Score       09/05/23 1612       7           Vitals:    09/05/23 1230 09/05/23 1300 09/05/23 1330 09/05/23 1600   BP: 125/67 113/64 112/59 103/59   Pulse: 79 81 79 76   Patient Position - Orthostatic VS:    Sitting         Visual Acuity      ED Medications  Medications   lidocaine (PF) (XYLOCAINE-MPF) 2 % injection 5 mL (5 mL Infiltration Given by Other 9/5/23 3216)   ketorolac (TORADOL) injection 15 mg (15 mg Intravenous Given 9/5/23 7359)   amoxicillin-clavulanate (AUGMENTIN) 875-125 mg per tablet 1 tablet (1 tablet Oral Given 9/5/23 1654)       Diagnostic Studies  Results Reviewed     Procedure Component Value Units Date/Time    CSF Diff [[id number]] Collected: 09/05/23 1506    Lab Status: Final result Specimen: Cerebrospinal Fluid from Lumbar Puncture Updated: 09/05/23 1644     Total Counted 100     Neutrophils % (CSF) 1 %      Lymphs % CSF 58 %      Monocytes % (CSF) 40 %      Basophils % CSF 1 %     CSF, White cell count with differential (Tube 4) [[id number]] Collected: 09/05/23 1506    Lab Status: Final result Specimen: Cerebrospinal Fluid from Lumbar Puncture Updated: 09/05/23 1607     Appearance, CSF Clear, Colorless     Tube Number, CSF 4     WBC, CSF 1 /uL      Xanthochromia No    CSF, RBC count (Tube 1) [[id number]]  (Abnormal) Collected: 09/05/23 1506    Lab Status: Final result Specimen: Cerebrospinal Fluid from Lumbar Puncture Updated: 09/05/23 1607     RBC, CSF 96 uL     CSF, RBC count (Tube 4) [[id number]]  (Normal) Collected: 09/05/23 1506    Lab Status: Final result Specimen: Cerebrospinal Fluid from Lumbar Puncture Updated: 09/05/23 1606     RBC, CSF 2 uL     CSF, Gram Stain (Tube 3) [[id number]] Collected: 09/05/23 1506    Lab Status: Final result Specimen: Cerebrospinal Fluid from Lumbar Puncture Updated: 09/05/23 1606     Gram Stain Result 1+ Mononuclear Cells      No Polys or Bacteria seen    CSF, Total Protein (Tube 2) [[id number]]  (Normal) Collected: 09/05/23 1506    Lab Status: Final result Specimen: Cerebrospinal Fluid from Lumbar Puncture Updated: 09/05/23 1537     Protein, CSF 39 mg/dL     CSF, Glucose (Tube 2) [[id number]]  (Normal) Collected: 09/05/23 1506    Lab Status: Final result Specimen: Cerebrospinal Fluid from Lumbar Puncture Updated: 09/05/23 1537     Glucose, CSF 69 mg/dL     Meningitis/Encephalitis (ME) Panel [[id number]] Collected: 09/05/23 1506    Lab Status:  In process Specimen: Cerebrospinal Fluid from Lumbar Puncture Updated: 09/05/23 1513    CSF, Culture and Gram stain (Tube 3) [[id number]] Collected: 09/05/23 1506    Lab Status: In process Specimen: Cerebrospinal Fluid from Lumbar Puncture Updated: 09/05/23 1513    Procalcitonin [[id number]]  (Abnormal) Collected: 09/05/23 1352    Lab Status: Final result Specimen: Blood from Arm, Right Updated: 09/05/23 1458     Procalcitonin 0.27 ng/ml     Lactic acid, plasma (w/reflex if result > 2.0) [[id number]]  (Normal) Collected: 09/05/23 1352    Lab Status: Final result Specimen: Blood from Arm, Right Updated: 09/05/23 1432     LACTIC ACID 1.4 mmol/L     Narrative:      Result may be elevated if tourniquet was used during collection.     Comprehensive metabolic panel [[id number]]  (Abnormal) Collected: 09/05/23 1352    Lab Status: Final result Specimen: Blood from Arm, Right Updated: 09/05/23 1431     Sodium 136 mmol/L      Potassium 3.8 mmol/L      Chloride 97 mmol/L      CO2 33 mmol/L      ANION GAP 6 mmol/L      BUN 14 mg/dL      Creatinine 0.92 mg/dL      Glucose 136 mg/dL      Calcium 9.6 mg/dL      AST 98 U/L      ALT 92 U/L      Alkaline Phosphatase 62 U/L      Total Protein 7.5 g/dL      Albumin 4.5 g/dL      Total Bilirubin 0.30 mg/dL      eGFR 69 ml/min/1.73sq m     Narrative:      [facility] (CKD):   •  Stage 1 with normal or high GFR (GFR > 90 mL/min/1.73 square meters)  •  Stage 2 Mild CKD (GFR = 60-89 mL/min/1.73 square meters)  •  Stage 3A Moderate CKD (GFR = 45-59 mL/min/1.73 square meters)  •  Stage 3B Moderate CKD (GFR = 30-44 mL/min/1.73 square meters)  •  Stage 4 Severe CKD (GFR = 15-29 mL/min/1.73 square meters)  •  Stage 5 End Stage CKD (GFR <15 mL/min/1.73 square meters)  Note: GFR calculation is accurate only with a steady state creatinine    Protime-INR [[id number]]  (Normal) Collected: 09/05/23 1352    Lab Status: Final result Specimen: Blood from Arm, Right Updated: 09/05/23 1427     Protime 13.4 seconds      INR 0.96    APTT [[id number]]  (Normal) Collected: 09/05/23 1352    Lab Status: Final result Specimen: Blood from Arm, Right Updated: 09/05/23 1427     PTT 27 seconds     CBC and differential [[id number]]  (Abnormal) Collected: 09/05/23 1352    Lab Status: Final result Specimen: Blood from Arm, Right Updated: 09/05/23 1410     WBC 5.26 Thousand/uL      RBC 4.92 Million/uL      Hemoglobin 14.6 g/dL      Hematocrit 44.1 %      MCV 90 fL      MCH 29.7 pg      MCHC 33.1 g/dL      RDW 12.4 %      MPV 10.2 fL      Platelets 750 Thousands/uL      nRBC 0 /100 WBCs      Neutrophils Relative 85 %      Immat GRANS % 0 %      Lymphocytes Relative 7 %      Monocytes Relative 5 %      Eosinophils Relative 3 %      Basophils Relative 0 %      Neutrophils Absolute 4.45 Thousands/µL      Immature Grans Absolute 0.01 Thousand/uL      Lymphocytes Absolute 0.37 Thousands/µL      Monocytes Absolute 0.25 Thousand/µL      Eosinophils Absolute 0.16 Thousand/µL      Basophils Absolute 0.02 Thousands/µL     Blood culture #1 [[id number]] Collected: 09/05/23 1352    Lab Status: In process Specimen: Blood from Hand, Left Updated: 09/05/23 1403    Blood culture #2 [[id number]] Collected: 09/05/23 1352    Lab Status: In process Specimen: Blood from Arm, Right Updated: 09/05/23 1403                 No orders to display              Procedures  Procedures         ED Course  ED Course as of 09/05/23 1832   Tue Sep 05, 2023   1414 Platelet count normal, will proceed with LP   1647 Long conversation with patient, explained I believe this is a drug rash, and a drug fever, no signs of meningitis on LP, offered admission for observation, patient respectfully declining, will discharge, strict return parameters                                             Medical Decision Making        Initial ED assessment:     51-year-old female, headache, neck pain, rash, currently on Bactrim and Flagyl for vaginal cuff cellulitis, postsurgical after hysterectomy.     Initial DDx includes but is not limited to:   Drug rash/drug fever, meningitis, doubt worsening intra-abdominal infection as abdomen is completely nontender    Initial ED plan:   Blood work, lumbar puncture, ultimate disposition pending ED work-up        Final ED summary/disposition:   After evaluation and workup in the emergency department, lumbar puncture without evidence of meningitis, aseptic or bacterial, after long conversation with patient and her , through shared decision making, decided to discharge, will change antibiotics from Bactrim and Flagyl to Augmentin for the remainder of her course, patient under strict return parameters, warning signs given for [place], understands and agrees to discharge plan     Amount and/or Complexity of Data Reviewed  Labs: ordered. Risk  Prescription drug management. Disposition  Final diagnoses:   Drug-induced skin rash   Drug induced fever   Transaminitis     Time reflects when diagnosis was documented in both MDM as applicable and the Disposition within this note     Time User Action Codes Description Comment    9/5/2023  4:40 PM Duncan LEO Add [L27.0] Drug-induced skin rash     9/5/2023  4:40 PM Lima Hamlin Add [R50.2] Drug induced fever     9/5/2023  4:44 PM Lima Hamlin Add [R74.01] Transaminitis       ED Disposition     ED Disposition   Discharge    Condition   Stable    Date/Time   Tue Sep 5, 2023  4:39 PM    [address] discharge to home/self care.                Follow-up Information     Follow up With Specialties Details Why Contact Info Additional Information    Talat Garrido MD Family Medicine Go in 3 days For repeat evaluation and  to ensure resolution [facility] [address] '[facility]' [facility] Emergency Medicine In 1 week If symptoms worsen [address] [facility], [place], [place], [address]          Discharge Medication List as of 9/5/2023  4:47 PM      START taking these medications    Details   amoxicillin-clavulanate (AUGMENTIN) 875-125 mg per tablet Take 1 tablet by mouth 2 (two) times a day for 7 days, Starting Tue 9/5/2023, Until Tue 9/12/2023, Normal         CONTINUE these medications which have NOT CHANGED    Details   acetaminophen (TYLENOL) 650 mg CR tablet Take 1 tablet (650 mg total) by mouth every 8 (eight) hours as needed for mild pain, Starting Tue 8/1/2023, Normal      ALPRAZolam (Xanax) 0.25 mg tablet Take 1 tablet (0.25 mg total) by mouth 3 (three) times a day as needed (for flying), Starting Mon 10/4/2021, Normal      B-D INTEGRA SYRINGE 23G X 1" 3 ML MISC USE EVERY 30 (THIRTY) DAYS, Normal      Cholecalciferol (VITAMIN D3 PO) Take 1 capsule by mouth in the morning, Historical Med      cyanocobalamin 1,000 mcg/mL INJECT 1 ML (1,000 MCG TOTAL) INTO A MUSCLE EVERY 30 (THIRTY) DAYS, Starting Fri 11/18/2022, Normal      estradiol (Vivelle-Dot) 0.05 MG/24HR Place 1 patch on the skin 2 (two) times a week, Starting Thu 8/17/2023, Until Fri 8/16/2024, Normal      ibuprofen (MOTRIN) 600 mg tablet Take 1 tablet (600 mg total) by mouth every 6 (six) hours as needed for mild pain, Starting Tue 8/1/2023, Normal      Multiple Vitamins-Minerals (ZINC PO) Take 1 tablet by mouth in the morning, Historical Med      multivitamin (THERAGRAN) TABS Take 1 tablet by mouth daily, Historical Med      ondansetron (ZOFRAN) 4 mg tablet Take 1 tablet (4 mg total) by mouth every 8 (eight) hours as needed for nausea or vomiting for up to 14 days, Starting Sat 8/26/2023, Until Sat 9/9/2023 at 2359, Normal         STOP taking these medications       metroNIDAZOLE (FLAGYL) 500 mg tablet Comments:   Reason for Stopping:         sulfamethoxazole-trimethoprim (BACTRIM DS) 800-160 mg per tablet Comments:   Reason for Stopping:               No discharge procedures on file.    PDMP Review       Value Time User    PDMP Reviewed  Yes 10/4/2021  3:06 PM Jennifer Barrera MD          ED Provider  Electronically Signed by           Sandhya Cooper DO  09/05/23 2132

## 2023-09-05 NOTE — ED PROCEDURE NOTE
Procedure  Lumbar puncture    Date/Time: 9/5/2023 3:09 PM    Performed by: Pam Blackmon MD  Authorized by: Pam Blackmon MD  Universal Protocol:  Consent: Written consent obtained. Risks and benefits: risks, benefits and alternatives were discussed  Consent given by: patient  Time out: Immediately prior to procedure a "time out" was called to verify the correct patient, procedure, equipment, support staff and site/side marked as required. Patient understanding: patient states understanding of the procedure being performed  Patient consent: the patient's understanding of the procedure matches consent given  Procedure consent: procedure consent matches procedure scheduled  Relevant documents: relevant documents present and verified  Test results: test results available and properly labeled  Site marked: the operative site was marked  Patient identity confirmed: verbally with patient      Patient location:  ED  Pre-procedure details:     Preparation: Patient was prepped and draped in usual sterile fashion    Indications:     Indications: evaluation for infection and diagnostic intervention    Anesthesia (see MAR for exact dosages): Anesthesia method:  Local infiltration    Local anesthetic:  Lidocaine 1% w/o epi  Procedure details:     Lumbar space:  L4-L5 interspace    Patient position:  Sitting    Equipment: Lumbar puncture kit used      Needle gauge:  20G x 3.5in    Needle type:  Spinal needle - Quincke tip    Ultrasound guidance: no      Number of attempts:  3    Fluid appearance:  Clear    Tubes of fluid:  4    Total volume (ml):  5  Post-procedure:     Puncture site:  Adhesive bandage applied and direct pressure applied    Patient tolerance of procedure:   Tolerated well, no immediate complications    Patient instructed to lie flat for one(1) hour post lumbar puncture.: Yes                       Pam Blackmon MD  09/05/23 1512

## 2023-09-09 LAB — BACTERIA CSF CULT: NO GROWTH

## 2023-09-10 LAB
BACTERIA BLD CULT: NORMAL
BACTERIA BLD CULT: NORMAL

## 2023-09-11 ENCOUNTER — OFFICE VISIT (OUTPATIENT)
Dept: OBGYN CLINIC | Facility: CLINIC | Age: 58
End: 2023-09-11

## 2023-09-11 VITALS
DIASTOLIC BLOOD PRESSURE: 62 MMHG | SYSTOLIC BLOOD PRESSURE: 110 MMHG | BODY MASS INDEX: 23.63 KG/M2 | WEIGHT: 138.4 LBS | HEIGHT: 64 IN

## 2023-09-11 DIAGNOSIS — Z48.89 ENCOUNTER FOR POSTOPERATIVE CARE: Primary | ICD-10-CM

## 2023-09-11 PROCEDURE — 99024 POSTOP FOLLOW-UP VISIT: CPT | Performed by: OBSTETRICS & GYNECOLOGY

## 2023-09-12 ENCOUNTER — RA CDI HCC (OUTPATIENT)
Dept: OTHER | Facility: HOSPITAL | Age: 58
End: 2023-09-12

## 2023-09-12 NOTE — PROGRESS NOTES
[address] [facility] coding opportunities       Chart reviewed, no opportunity found: CHART REVIEWED, NO OPPORTUNITY FOUND        Patients Insurance        Commercial Insurance: [address]

## 2023-09-12 NOTE — PROGRESS NOTES
Assessment/Plan    Diagnoses and all orders for this visit:    Encounter for postoperative care  Encouraged to gradually increase level of activity. Would abstain from intercourse x 2 more weeks; advised to use lubrication. Encouraged to call with any further questions/ concerns. Follow up prn or in one year. Subjective     Ailin Hernandez is a 62 y.o. female here for a problem visit. Patient is here for 6 week post op visit from [facility]. Post op course c/b vaginal cuff cellulitis/ abscess. Tx with Abx. Pt. Was then seen in the ER again on  with suspected drug-induced rash and fever. Previous meds were discontinued and she is now finishing up course of amox. Pt denies any abd/pelvic pain, vaginal bleeding/ discharge. Voiding and BM without issue. No concerns today. Transdermal estradiol to treat VMS. Patient Active Problem List   Diagnosis   • Neoplasm of uncertain behavior of bone   • Celiac disease   • Lymph node enlargement   • Combined abdominal and pelvic pain   • S/P hysterectomy with oophorectomy         Gynecologic History  Patient's last menstrual period was 2021 (approximate).   Contraception: status post hysterectomy  Last Pap:      Obstetric History  OB History    Para Term  AB Living   3 3 3     3   SAB IAB Ectopic Multiple Live Births           3      # Outcome Date GA Lbr El/2nd Weight Sex Delivery Anes PTL Lv   3 Term      Vag-Spont   TANG   2 Term      Vag-Spont   TANG   1 Term      Vag-Spont   TANG      Obstetric Comments   Menarche 15          Past Medical History:   Diagnosis Date   • Abnormal uterine bleeding    • Anemia    • Anesthesia complication     difficulty awakening and had trouble maintaing airway but didn't end up needing reintubation   • Anxiety    • Asthma Childhood   • Back pain    • Celiac disease     last assessed: 2016   • Chicken pox     AS PER PT   • Claustrophobia    • Depression    • Fibroid    • GERD (gastroesophageal reflux disease)    • Kidney stone    • Miscarriage    • Peptic ulceration 2022   • Raynaud's disease    • Wears reading eyeglasses        Past Surgical History:   Procedure Laterality Date   • COLONOSCOPY     • ENDOMETRIAL ABLATION     • AR CYSTOURETHROSCOPY N/A 2023    Procedure: CYSTOSCOPY;  Surgeon: Susana Marinelli MD;  Location: [facility];  Service: Gynecology   • AR LAPS TOTAL HYSTERECT 250 GM/< W/RMVL TUBE/OVARY Bilateral 2023    Procedure: HYSTERECTOMY LAPAROSCOPIC TOTAL ([address] [facility]) WITH SALPINGO-OOPHERECTOMY;  Surgeon: Susana Marinelli MD;  Location: [facility];  Service: Gynecology   • ROTATOR CUFF REPAIR Left 2017         Family History   Problem Relation Age of Onset   • Heart disease Mother         Pacemaker and defibrillator CHF   • Hypertension Mother    • Thyroid disease Mother    • Cancer Father         Bladder and kidney   • Dementia Father    • Depression Father             • Heart disease Father         Defibrillator.  Quadruple bypass at 58 yrs   • Stroke Father    • Heart disease Family    • Diabetes Family    • Cancer Family    • Substance Abuse Neg Hx    • Mental illness Neg Hx        Social History     Socioeconomic History   • Marital status: /Civil Union     Spouse name: Not on file   • Number of children: Not on file   • Years of education: Not on file   • Highest education level: Not on file   Occupational History   • Not on file   Tobacco Use   • Smoking status: Former   • Smokeless tobacco: Never   • Tobacco comments:     Quit 1989   Vaping Use   • Vaping Use: Never used   Substance and Sexual Activity   • Alcohol use: Not Currently     Comment: social   • Drug use: Not Currently   • Sexual activity: Yes     Partners: Male     Birth control/protection: Male Sterilization, Post-menopausal   Other Topics Concern   • Not on file   Social History Narrative   • Not on file     Social Determinants of Health     Financial Resource Strain: Not on file   Food Insecurity: Not on file   Transportation Needs: Not on file   Physical Activity: Not on file   Stress: Not on file   Social Connections: Not on file   Intimate Partner Violence: Not on file   Housing Stability: Not on file       Allergies  Barley grass - food allergy, Gluten meal - food allergy, Oat - food allergy, Sulfamethoxazole-trimethoprim, and Wheat bran - food allergy    Medications    Current Outpatient Medications:   •  acetaminophen (TYLENOL) 650 mg CR tablet, Take 1 tablet (650 mg total) by mouth every 8 (eight) hours as needed for mild pain (Patient taking differently: Take 650 mg by mouth every 8 (eight) hours as needed for mild pain Taking 500mg every 12 hours), Disp: 30 tablet, Rfl: 0  •  amoxicillin-clavulanate (AUGMENTIN) 875-125 mg per tablet, Take 1 tablet by mouth 2 (two) times a day for 7 days, Disp: 14 tablet, Rfl: 0  •  B-D INTEGRA SYRINGE 23G X 1" 3 ML MISC, USE EVERY 30 (THIRTY) DAYS, Disp: 3 each, Rfl: 3  •  Cholecalciferol (VITAMIN D3 PO), Take 1 capsule by mouth in the morning, Disp: , Rfl:   •  cyanocobalamin 1,000 mcg/mL, INJECT 1 ML (1,000 MCG TOTAL) INTO A MUSCLE EVERY 30 (THIRTY) DAYS, Disp: 3 mL, Rfl: 6  •  estradiol (Vivelle-Dot) 0.05 MG/24HR, Place 1 patch on the skin 2 (two) times a week, Disp: 8 patch, Rfl: 11  •  ibuprofen (MOTRIN) 600 mg tablet, Take 1 tablet (600 mg total) by mouth every 6 (six) hours as needed for mild pain, Disp: 30 tablet, Rfl: 0  •  Multiple Vitamins-Minerals (ZINC PO), Take 1 tablet by mouth in the morning, Disp: , Rfl:   •  multivitamin (THERAGRAN) TABS, Take 1 tablet by mouth daily, Disp: , Rfl:   •  ondansetron (ZOFRAN) 4 mg tablet, Take 1 tablet (4 mg total) by mouth every 8 (eight) hours as needed for nausea or vomiting for up to 14 days, Disp: 20 tablet, Rfl: 0  •  ALPRAZolam (Xanax) 0.25 mg tablet, Take 1 tablet (0.25 mg total) by mouth 3 (three) times a day as needed (for flying) (Patient not taking: Reported on 9/11/2023), Disp: 30 tablet, Rfl: 0      Review of Systems  Review of Systems   Constitutional: Negative for activity change, chills, fever and unexpected weight change. HENT: Negative for congestion, ear pain, hearing loss and sore throat. Respiratory: Negative for cough, chest tightness and shortness of breath. Cardiovascular: Negative for chest pain and leg swelling. Gastrointestinal: Negative for abdominal pain, constipation, diarrhea, nausea and vomiting. Genitourinary: Negative for difficulty urinating, dysuria, frequency, menstrual problem, pelvic pain, vaginal discharge and vaginal pain. Skin: Negative for color change and rash. Neurological: Negative for dizziness, numbness and headaches. Psychiatric/Behavioral: Negative for agitation and confusion. Objective     /62 (BP Location: Left arm, Patient Position: Sitting, Cuff Size: Standard)   Ht 5' 4" (1.626 m)   Wt 62.8 kg (138 lb 6.4 oz)   LMP 08/13/2021 (Approximate)   BMI 23.76 kg/m²       Physical Exam  Constitutional:       General: She is not in acute distress. Appearance: Normal appearance. She is normal weight. Genitourinary:      Vulva, bladder, rectum and urethral meatus normal.      No lesions in the vagina. Right Labia: No rash, tenderness or lesions. Left Labia: No tenderness, lesions or rash. No labial fusion noted. Vaginal cuff intact. Perineal sutures intact. No vaginal discharge, tenderness or bleeding. No vaginal prolapse present. No vaginal atrophy present. Right Adnexa: absent. Left Adnexa: absent. Cervix is absent. Uterus is absent. HENT:      Head: Normocephalic and atraumatic. Nose: Nose normal.   Eyes:      Conjunctiva/sclera: Conjunctivae normal.      Pupils: Pupils are equal, round, and reactive to light. Cardiovascular:      Rate and Rhythm: Normal rate. Pulses: Normal pulses.    Pulmonary:      Effort: Pulmonary effort is normal. Abdominal:      General: Abdomen is flat. Palpations: Abdomen is soft. Musculoskeletal:         General: Normal range of motion. Cervical back: Normal range of motion. Neurological:      General: No focal deficit present. Mental Status: She is alert and oriented to person, place, and time. Mental status is at baseline. Skin:     General: Skin is warm and dry. Psychiatric:         Mood and Affect: Mood normal.         Behavior: Behavior normal.         Thought Content: Thought content normal.         Judgment: Judgment normal.   Vitals and nursing note reviewed.

## 2023-11-10 DIAGNOSIS — N95.1 VASOMOTOR SYMPTOMS DUE TO MENOPAUSE: ICD-10-CM

## 2023-11-10 RX ORDER — ESTRADIOL 0.05 MG/D
1 PATCH, EXTENDED RELEASE TRANSDERMAL 2 TIMES WEEKLY
Qty: 8 PATCH | Refills: 11 | Status: CANCELLED | OUTPATIENT
Start: 2023-11-13 | End: 2024-11-12

## 2023-11-10 NOTE — TELEPHONE ENCOUNTER
Pt called requesting a decrease in dosage of her estradiol. Pt states she discussed this possibility with .  Pt would like the medication sent to the pharmacy on file

## 2023-11-12 DIAGNOSIS — N95.1 VASOMOTOR SYMPTOMS DUE TO MENOPAUSE: Primary | ICD-10-CM

## 2023-11-12 RX ORDER — ESTRADIOL 0.04 MG/D
1 FILM, EXTENDED RELEASE TRANSDERMAL 2 TIMES WEEKLY
Qty: 8 PATCH | Refills: 11 | Status: SHIPPED | OUTPATIENT
Start: 2023-11-13 | End: 2024-10-14

## 2023-12-23 DIAGNOSIS — E53.8 B12 DEFICIENCY: ICD-10-CM

## 2023-12-26 RX ORDER — CYANOCOBALAMIN 1000 UG/ML
1000 INJECTION, SOLUTION INTRAMUSCULAR; SUBCUTANEOUS
Qty: 3 ML | Refills: 6 | Status: SHIPPED | OUTPATIENT
Start: 2023-12-26

## 2024-01-16 DIAGNOSIS — Z00.00 ROUTINE GENERAL MEDICAL EXAMINATION AT A HEALTH CARE FACILITY: ICD-10-CM

## 2024-01-16 DIAGNOSIS — Z13.220 SCREENING FOR HYPERLIPIDEMIA: ICD-10-CM

## 2024-01-16 DIAGNOSIS — Z13.6 SCREENING FOR HYPERTENSION: ICD-10-CM

## 2024-01-16 DIAGNOSIS — Z13.29 SCREENING FOR HYPOTHYROIDISM: Primary | ICD-10-CM

## 2024-04-04 DIAGNOSIS — E53.8 B12 DEFICIENCY: ICD-10-CM

## 2024-04-04 RX ORDER — NEEDLES, FILTER 19GX1 1/2"
NEEDLE, DISPOSABLE MISCELLANEOUS
Qty: 3 EACH | Refills: 3 | Status: SHIPPED | OUTPATIENT
Start: 2024-04-04

## 2024-04-18 LAB
BASOPHILS # BLD AUTO: 0.1 X10E3/UL (ref 0–0.2)
BASOPHILS NFR BLD AUTO: 1 %
EOSINOPHIL # BLD AUTO: 0.1 X10E3/UL (ref 0–0.4)
EOSINOPHIL NFR BLD AUTO: 2 %
ERYTHROCYTE [DISTWIDTH] IN BLOOD BY AUTOMATED COUNT: 11.9 % (ref 11.7–15.4)
HCT VFR BLD AUTO: 45.6 % (ref 34–46.6)
HGB BLD-MCNC: 15 G/DL (ref 11.1–15.9)
IMM GRANULOCYTES # BLD: 0 X10E3/UL (ref 0–0.1)
IMM GRANULOCYTES NFR BLD: 0 %
LYMPHOCYTES # BLD AUTO: 1.6 X10E3/UL (ref 0.7–3.1)
LYMPHOCYTES NFR BLD AUTO: 42 %
MCH RBC QN AUTO: 28.8 PG (ref 26.6–33)
MCHC RBC AUTO-ENTMCNC: 32.9 G/DL (ref 31.5–35.7)
MCV RBC AUTO: 88 FL (ref 79–97)
MONOCYTES # BLD AUTO: 0.4 X10E3/UL (ref 0.1–0.9)
MONOCYTES NFR BLD AUTO: 10 %
NEUTROPHILS # BLD AUTO: 1.7 X10E3/UL (ref 1.4–7)
NEUTROPHILS NFR BLD AUTO: 45 %
PLATELET # BLD AUTO: 307 X10E3/UL (ref 150–450)
RBC # BLD AUTO: 5.21 X10E6/UL (ref 3.77–5.28)
WBC # BLD AUTO: 3.8 X10E3/UL (ref 3.4–10.8)

## 2024-04-19 LAB
ALBUMIN SERPL-MCNC: 4.6 G/DL (ref 3.8–4.9)
ALBUMIN/GLOB SERPL: 2.2 {RATIO} (ref 1.2–2.2)
ALP SERPL-CCNC: 63 IU/L (ref 44–121)
ALT SERPL-CCNC: 20 IU/L (ref 0–32)
AST SERPL-CCNC: 21 IU/L (ref 0–40)
BILIRUB SERPL-MCNC: 0.4 MG/DL (ref 0–1.2)
BUN SERPL-MCNC: 17 MG/DL (ref 6–24)
BUN/CREAT SERPL: 22 (ref 9–23)
CALCIUM SERPL-MCNC: 9.4 MG/DL (ref 8.7–10.2)
CHLORIDE SERPL-SCNC: 102 MMOL/L (ref 96–106)
CHOLEST SERPL-MCNC: 233 MG/DL (ref 100–199)
CHOLEST/HDLC SERPL: 2.4 RATIO (ref 0–4.4)
CO2 SERPL-SCNC: 27 MMOL/L (ref 20–29)
CREAT SERPL-MCNC: 0.79 MG/DL (ref 0.57–1)
EGFR: 87 ML/MIN/1.73
GLOBULIN SER-MCNC: 2.1 G/DL (ref 1.5–4.5)
GLUCOSE SERPL-MCNC: 93 MG/DL (ref 70–99)
HDLC SERPL-MCNC: 99 MG/DL
LDLC SERPL CALC-MCNC: 125 MG/DL (ref 0–99)
POTASSIUM SERPL-SCNC: 4.3 MMOL/L (ref 3.5–5.2)
PROT SERPL-MCNC: 6.7 G/DL (ref 6–8.5)
SL AMB VLDL CHOLESTEROL CALC: 9 MG/DL (ref 5–40)
SODIUM SERPL-SCNC: 143 MMOL/L (ref 134–144)
TRIGL SERPL-MCNC: 53 MG/DL (ref 0–149)
TSH SERPL DL<=0.005 MIU/L-ACNC: 1.17 UIU/ML (ref 0.45–4.5)

## 2024-04-22 DIAGNOSIS — F41.9 ANXIETY: ICD-10-CM

## 2024-04-22 NOTE — TELEPHONE ENCOUNTER
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  [x] Refill   [] Prior Auth  [] Other:     Office:   [x] PCP/Provider -   [] Specialty/Provider -     Medication:     ALPRAZolam (Xanax) 0.25 mg tablet       Dose/Frequency:  Take 1 tablet (0.25 mg total) by mouth 3 (three) times a day as needed (for flying)     Quantity: 30 tablet     Pharmacy: [address] [phone]     Does the patient have enough for 3 days?   [] Yes   [x] No - Send as HP to POD

## 2024-04-22 NOTE — TELEPHONE ENCOUNTER
Pt called again about the xanax. Pt is concerned about picking up the med before the pharmacy closes for her early morning flight.

## 2024-04-23 RX ORDER — ALPRAZOLAM 0.25 MG/1
0.25 TABLET ORAL 3 TIMES DAILY PRN
Qty: 15 TABLET | Refills: 0 | Status: SHIPPED | OUTPATIENT
Start: 2024-04-23

## 2024-04-29 ENCOUNTER — RA CDI HCC (OUTPATIENT)
Dept: OTHER | Facility: HOSPITAL | Age: 59
End: 2024-04-29

## 2024-04-29 NOTE — PROGRESS NOTES
HCC coding opportunities       Chart reviewed, no opportunity found: CHART REVIEWED, NO OPPORTUNITY FOUND        Patients Insurance        Commercial Insurance: [employer]

## 2024-05-05 PROBLEM — R10.2 COMBINED ABDOMINAL AND PELVIC PAIN: Status: RESOLVED | Noted: 2023-01-14 | Resolved: 2024-05-05

## 2024-05-05 PROBLEM — R59.9 LYMPH NODE ENLARGEMENT: Status: RESOLVED | Noted: 2023-01-14 | Resolved: 2024-05-05

## 2024-05-05 PROBLEM — I73.00 RAYNAUD PHENOMENON: Status: ACTIVE | Noted: 2022-12-14

## 2024-05-05 PROBLEM — R10.9 COMBINED ABDOMINAL AND PELVIC PAIN: Status: RESOLVED | Noted: 2023-01-14 | Resolved: 2024-05-05

## 2024-05-06 ENCOUNTER — OFFICE VISIT (OUTPATIENT)
Dept: FAMILY MEDICINE CLINIC | Facility: CLINIC | Age: 59
End: 2024-05-06
Payer: COMMERCIAL

## 2024-05-06 VITALS
HEIGHT: 64 IN | WEIGHT: 141 LBS | BODY MASS INDEX: 24.07 KG/M2 | RESPIRATION RATE: 16 BRPM | SYSTOLIC BLOOD PRESSURE: 108 MMHG | TEMPERATURE: 98 F | OXYGEN SATURATION: 98 % | DIASTOLIC BLOOD PRESSURE: 60 MMHG | HEART RATE: 74 BPM

## 2024-05-06 DIAGNOSIS — Z13.820 SCREENING FOR OSTEOPOROSIS: ICD-10-CM

## 2024-05-06 DIAGNOSIS — Z78.0 ASYMPTOMATIC MENOPAUSE: ICD-10-CM

## 2024-05-06 DIAGNOSIS — Z90.710 S/P HYSTERECTOMY WITH OOPHORECTOMY: ICD-10-CM

## 2024-05-06 DIAGNOSIS — Z12.31 ENCOUNTER FOR SCREENING MAMMOGRAM FOR BREAST CANCER: ICD-10-CM

## 2024-05-06 DIAGNOSIS — Z13.29 SCREENING FOR HYPOTHYROIDISM: ICD-10-CM

## 2024-05-06 DIAGNOSIS — Z13.6 SCREENING FOR HYPERTENSION: ICD-10-CM

## 2024-05-06 DIAGNOSIS — K90.0 CELIAC DISEASE: ICD-10-CM

## 2024-05-06 DIAGNOSIS — I73.00 RAYNAUD'S PHENOMENON WITHOUT GANGRENE: ICD-10-CM

## 2024-05-06 DIAGNOSIS — Z00.00 ROUTINE GENERAL MEDICAL EXAMINATION AT A HEALTH CARE FACILITY: Primary | ICD-10-CM

## 2024-05-06 DIAGNOSIS — Z90.721 S/P HYSTERECTOMY WITH OOPHORECTOMY: ICD-10-CM

## 2024-05-06 DIAGNOSIS — Z13.220 SCREENING FOR HYPERLIPIDEMIA: ICD-10-CM

## 2024-05-06 DIAGNOSIS — Z12.11 COLON CANCER SCREENING: ICD-10-CM

## 2024-05-06 PROCEDURE — 99396 PREV VISIT EST AGE 40-64: CPT | Performed by: FAMILY MEDICINE

## 2024-05-06 PROCEDURE — 93000 ELECTROCARDIOGRAM COMPLETE: CPT | Performed by: FAMILY MEDICINE

## 2024-05-06 NOTE — LETTER
"   LEONORA ROMEMILAGROGREYSON      Current Outpatient Medications:     ALPRAZolam (Xanax) 0.25 mg tablet, Take 1 tablet (0.25 mg total) by mouth 3 (three) times a day as needed (for flying), Disp: 15 tablet, Rfl: 0    B-D INTEGRA SYRINGE 23G X 1\" 3 ML MISC, USE EVERY 30 (THIRTY) DAYS, Disp: 3 each, Rfl: 3    Cholecalciferol (VITAMIN D3 PO), Take 1 capsule by mouth in the morning, Disp: , Rfl:     cyanocobalamin 1,000 mcg/mL, INJECT 1 ML (1,000 MCG TOTAL) INTO A MUSCLE EVERY 30 (THIRTY) DAYS, Disp: 3 mL, Rfl: 6    estradiol (Vivelle-Dot) 0.0375 MG/24HR, Place 1 patch on the skin 2 (two) times a week, Disp: 8 patch, Rfl: 11    Multiple Vitamins-Minerals (ZINC PO), Take 1 tablet by mouth in the morning, Disp: , Rfl:       Recent Results (from the past 672 hour(s))   CBC and differential    Collection Time: 04/18/24  8:29 AM   Result Value Ref Range    White Blood Cell Count 3.8 3.4 - 10.8 x10E3/uL    Red Blood Cell Count 5.21 3.77 - 5.28 x10E6/uL    Hemoglobin 15.0 11.1 - 15.9 g/dL    HCT 45.6 34.0 - 46.6 %    MCV 88 79 - 97 fL    MCH 28.8 26.6 - 33.0 pg    MCHC 32.9 31.5 - 35.7 g/dL    RDW 11.9 11.7 - 15.4 %    Platelet Count 307 150 - 450 x10E3/uL    Neutrophils 45 Not Estab. %    Lymphocytes 42 Not Estab. %    Monocytes 10 Not Estab. %    Eosinophils 2 Not Estab. %    Basophils PCT 1 Not Estab. %    Neutrophils (Absolute) 1.7 1.4 - 7.0 x10E3/uL    Lymphocytes (Absolute) 1.6 0.7 - 3.1 x10E3/uL    Monocytes (Absolute) 0.4 0.1 - 0.9 x10E3/uL    Eosinophils (Absolute) 0.1 0.0 - 0.4 x10E3/uL    Basophils ABS 0.1 0.0 - 0.2 x10E3/uL    Immature Granulocytes 0 Not Estab. %    Immature Granulocytes (Absolute) 0.0 0.0 - 0.1 x10E3/uL   Comprehensive metabolic panel    Collection Time: 04/18/24  8:29 AM   Result Value Ref Range    Glucose, Random 93 70 - 99 mg/dL    BUN 17 6 - 24 mg/dL    Creatinine 0.79 0.57 - 1.00 mg/dL    eGFR 87 >59 mL/min/1.73    SL AMB BUN/CREATININE RATIO 22 9 - 23    Sodium 143 134 - 144 mmol/L    Potassium 4.3 " 3.5 - 5.2 mmol/L    Chloride 102 96 - 106 mmol/L    CO2 27 20 - 29 mmol/L    CALCIUM 9.4 8.7 - 10.2 mg/dL    Protein, Total 6.7 6.0 - 8.5 g/dL    Albumin 4.6 3.8 - 4.9 g/dL    Globulin, Total 2.1 1.5 - 4.5 g/dL    Albumin/Globulin Ratio 2.2 1.2 - 2.2    TOTAL BILIRUBIN 0.4 0.0 - 1.2 mg/dL    Alk Phos Isoenzymes 63 44 - 121 IU/L    AST 21 0 - 40 IU/L    ALT 20 0 - 32 IU/L   Lipid panel    Collection Time: 04/18/24  8:29 AM   Result Value Ref Range    Cholesterol, Total 233 (H) 100 - 199 mg/dL    Triglycerides 53 0 - 149 mg/dL    HDL 99 >39 mg/dL    VLDL Cholesterol Calculated 9 5 - 40 mg/dL    LDL Calculated 125 (H) 0 - 99 mg/dL    T. Chol/HDL Ratio 2.4 0.0 - 4.4 ratio   TSH, 3rd generation    Collection Time: 04/18/24  8:29 AM   Result Value Ref Range    TSH 1.170 0.450 - 4.500 uIU/mL

## 2024-05-06 NOTE — PATIENT INSTRUCTIONS
DISCUSSED HEALTH ISSUES  HEALTHY DIET AND EXERCISE  BW WILL BE OBTAINED  MAMMOGRAPHY   RECOMMEND CALCIUM 5840-2344 MG DAILY  VITAMIN D3  1000 IU DAILY  RV IN 1 YEAR FOR ANNUAL EXAM, SOONER IF NEEDED      Recent Results (from the past 672 hour(s))   CBC and differential    Collection Time: 04/18/24  8:29 AM   Result Value Ref Range    White Blood Cell Count 3.8 3.4 - 10.8 x10E3/uL    Red Blood Cell Count 5.21 3.77 - 5.28 x10E6/uL    Hemoglobin 15.0 11.1 - 15.9 g/dL    HCT 45.6 34.0 - 46.6 %    MCV 88 79 - 97 fL    MCH 28.8 26.6 - 33.0 pg    MCHC 32.9 31.5 - 35.7 g/dL    RDW 11.9 11.7 - 15.4 %    Platelet Count 307 150 - 450 x10E3/uL    Neutrophils 45 Not Estab. %    Lymphocytes 42 Not Estab. %    Monocytes 10 Not Estab. %    Eosinophils 2 Not Estab. %    Basophils PCT 1 Not Estab. %    Neutrophils (Absolute) 1.7 1.4 - 7.0 x10E3/uL    Lymphocytes (Absolute) 1.6 0.7 - 3.1 x10E3/uL    Monocytes (Absolute) 0.4 0.1 - 0.9 x10E3/uL    Eosinophils (Absolute) 0.1 0.0 - 0.4 x10E3/uL    Basophils ABS 0.1 0.0 - 0.2 x10E3/uL    Immature Granulocytes 0 Not Estab. %    Immature Granulocytes (Absolute) 0.0 0.0 - 0.1 x10E3/uL   Comprehensive metabolic panel    Collection Time: 04/18/24  8:29 AM   Result Value Ref Range    Glucose, Random 93 70 - 99 mg/dL    BUN 17 6 - 24 mg/dL    Creatinine 0.79 0.57 - 1.00 mg/dL    eGFR 87 >59 mL/min/1.73    SL AMB BUN/CREATININE RATIO 22 9 - 23    Sodium 143 134 - 144 mmol/L    Potassium 4.3 3.5 - 5.2 mmol/L    Chloride 102 96 - 106 mmol/L    CO2 27 20 - 29 mmol/L    CALCIUM 9.4 8.7 - 10.2 mg/dL    Protein, Total 6.7 6.0 - 8.5 g/dL    Albumin 4.6 3.8 - 4.9 g/dL    Globulin, Total 2.1 1.5 - 4.5 g/dL    Albumin/Globulin Ratio 2.2 1.2 - 2.2    TOTAL BILIRUBIN 0.4 0.0 - 1.2 mg/dL    Alk Phos Isoenzymes 63 44 - 121 IU/L    AST 21 0 - 40 IU/L    ALT 20 0 - 32 IU/L   Lipid panel    Collection Time: 04/18/24  8:29 AM   Result Value Ref Range    Cholesterol, Total 233 (H) 100 - 199 mg/dL    Triglycerides 53  0 - 149 mg/dL    HDL 99 >39 mg/dL    VLDL Cholesterol Calculated 9 5 - 40 mg/dL    LDL Calculated 125 (H) 0 - 99 mg/dL    T. Chol/HDL Ratio 2.4 0.0 - 4.4 ratio   TSH, 3rd generation    Collection Time: 04/18/24  8:29 AM   Result Value Ref Range    TSH 1.170 0.450 - 4.500 uIU/mL

## 2024-05-06 NOTE — PROGRESS NOTES
Depression Screening and Follow-up Plan: Patient was screened for depression during today's encounter. They screened negative with a PHQ-2 score of 0.    FAMILY PRACTICE HEALTH MAINTENANCE OFFICE VISIT  [facility] - [facility]    NAME: Sandi Hernandez  AGE: 58 y.o. SEX: female  : 1965     DATE: 2024    Assessment and Plan     Problem List Items Addressed This Visit        Digestive    Celiac disease       Surgery/Wound/Pain    S/P hysterectomy with oophorectomy       Other    Raynaud phenomenon   Other Visit Diagnoses     Routine general medical examination at a health care facility    -  Primary    Screening for hypertension        Relevant Orders    POCT ECG (Completed)    Screening for hyperlipidemia        Screening for hypothyroidism        Colon cancer screening        Asymptomatic menopause        Relevant Orders    DXA bone density spine hip and pelvis    Screening for osteoporosis        Relevant Orders    DXA bone density spine hip and pelvis    Encounter for screening mammogram for breast cancer        Relevant Orders    Mammo screening bilateral w 3d & cad               No follow-ups on file.        Chief Complaint     Chief Complaint   Patient presents with   • Physical Exam     Bentley/SINDHU       History of Present Illness     DISCUSSED HEALTH ISSUES  REVIEWED MEDICAL RECORD  NO CONCERNS AT THIS TIME            Well Adult Physical   Patient here for a comprehensive physical exam.      Diet and Physical Activity  Diet: well balanced diet  Weight concerns: None, patient's BMI is between 18.5-24.9  Exercise: intermittently      Depression Screen  PHQ-2/9 Depression Screening    Little interest or pleasure in doing things: 0 - not at all  Feeling down, depressed, or hopeless: 0 - not at all  PHQ-2 Score: 0  PHQ-2 Interpretation: Negative depression screen          General Health  Hearing: Normal:  bilateral  Vision: no vision problems  Dental: regular  dental visits    Reproductive Health          The following portions of the patient's history were reviewed and updated as appropriate: allergies, current medications, past family history, past medical history, past social history, past surgical history and problem list.    Review of Systems     Review of Systems   Constitutional:  Negative for chills, fatigue and fever.   HENT:  Negative for congestion, ear discharge, ear pain, mouth sores, postnasal drip, sore throat and trouble swallowing.    Eyes:  Negative for pain, discharge and visual disturbance.   Respiratory:  Negative for cough, shortness of breath and wheezing.    Cardiovascular:  Negative for chest pain, palpitations and leg swelling.   Gastrointestinal:  Negative for abdominal distention, abdominal pain, blood in stool, diarrhea and nausea.   Endocrine: Negative for polydipsia, polyphagia and polyuria.   Genitourinary:  Negative for dysuria, frequency, hematuria and urgency.   Musculoskeletal:  Negative for arthralgias, gait problem and joint swelling.   Skin:  Negative for pallor and rash.   Neurological:  Negative for dizziness, syncope, speech difficulty, weakness, light-headedness, numbness and headaches.   Hematological:  Negative for adenopathy.   Psychiatric/Behavioral:  Negative for behavioral problems, confusion and sleep disturbance. The patient is not nervous/anxious.        Past Medical History     Past Medical History:   Diagnosis Date   • Abnormal uterine bleeding    • Anemia 2003   • Anesthesia complication     difficulty awakening and had trouble maintaing airway but didn't end up needing reintubation   • Anxiety    • Asthma Childhood   • Back pain    • Celiac disease     last assessed: 9/27/2016   • Chicken pox     AS PER PT   • Claustrophobia    • Depression    • Fibroid    • GERD (gastroesophageal reflux disease)    • Kidney stone    • Miscarriage 1998   • Peptic ulceration 12/2022   • Raynaud's disease    • Wears reading eyeglasses         Past Surgical History     Past Surgical History:   Procedure Laterality Date   • COLONOSCOPY     • ENDOMETRIAL ABLATION     • ME CYSTOURETHROSCOPY N/A 8/1/2023    Procedure: CYSTOSCOPY;  Surgeon: Lydia Green MD;  Location: [facility];  Service: Gynecology   • ME LAPS TOTAL HYSTERECT 250 GM/< W/RMVL TUBE/OVARY Bilateral 8/1/2023    Procedure: HYSTERECTOMY LAPAROSCOPIC TOTAL (LTH) WITH SALPINGO-OOPHERECTOMY;  Surgeon: Lydia Green MD;  Location[facility];  Service: Gynecology   • ROTATOR CUFF REPAIR Left 2017       Social History     Social History     Socioeconomic History   • Marital status: /Civil Union     Spouse name: None   • Number of children: None   • Years of education: None   • Highest education level: None   Occupational History   • None   Tobacco Use   • Smoking status: Former     Current packs/day: 0.50     Average packs/day: 0.5 packs/day for 34.5 years (17.3 ttl pk-yrs)     Types: Cigarettes     Start date: 11/1/1989     Quit date: 6/15/1983     Passive exposure: Past   • Smokeless tobacco: Never   • Tobacco comments:     Quit 1989   Vaping Use   • Vaping status: Never Used   Substance and Sexual Activity   • Alcohol use: Not Currently     Comment: social   • Drug use: Not Currently   • Sexual activity: Yes     Partners: Male     Birth control/protection: Male Sterilization, Post-menopausal   Other Topics Concern   • None   Social History Narrative   • None     Social Determinants of Health     Financial Resource Strain: Not on file   Food Insecurity: Not on file   Transportation Needs: Not on file   Physical Activity: Not on file   Stress: Not on file   Social Connections: Not on file   Intimate Partner Violence: Not on file   Housing Stability: Not on file       Family History     Family History   Problem Relation Age of Onset   • Heart disease Mother         Pacemaker and defibrillator CHF   • Hypertension Mother    • Thyroid disease Mother    • Cancer Father          "Bladder and kidney   • Dementia Father    • Depression Father             • Heart disease Father         Defibrillator. Quadruple bypass at 62 yrs   • Stroke Father    • Heart disease Family    • Diabetes Family    • Cancer Family    • Substance Abuse Neg Hx    • Mental illness Neg Hx        Current Medications       Current Outpatient Medications:   •  ALPRAZolam (Xanax) 0.25 mg tablet, Take 1 tablet (0.25 mg total) by mouth 3 (three) times a day as needed (for flying), Disp: 15 tablet, Rfl: 0  •  B-D INTEGRA SYRINGE 23G X 1\" 3 ML MISC, USE EVERY 30 (THIRTY) DAYS, Disp: 3 each, Rfl: 3  •  Cholecalciferol (VITAMIN D3 PO), Take 1 capsule by mouth in the morning, Disp: , Rfl:   •  cyanocobalamin 1,000 mcg/mL, INJECT 1 ML (1,000 MCG TOTAL) INTO A MUSCLE EVERY 30 (THIRTY) DAYS, Disp: 3 mL, Rfl: 6  •  estradiol (Vivelle-Dot) 0.0375 MG/24HR, Place 1 patch on the skin 2 (two) times a week, Disp: 8 patch, Rfl: 11  •  Multiple Vitamins-Minerals (ZINC PO), Take 1 tablet by mouth in the morning, Disp: , Rfl:      Allergies     Allergies   Allergen Reactions   • Barley Grass - Food Allergy Vomiting, Dermatitis, Dizziness, GI Intolerance, Headache, Itching, Lightheadedness and Diarrhea   • Gluten Meal - Food Allergy GI Intolerance   • Oat - Food Allergy Dermatitis, GI Intolerance, Headache and Irritability   • Sulfamethoxazole-Trimethoprim Dizziness, Eye Swelling, Headache, Hives, Itching, Photosensitivity, Swelling and Tinnitus   • Wheat Bran - Food Allergy Diarrhea, Dermatitis, GI Intolerance, Vomiting, Irritability, Anxiety and Headache     All wheat products       Objective     /60   Pulse 74   Temp 98 °F (36.7 °C) (Temporal)   Resp 16   Ht 5' 4\" (1.626 m)   Wt 64 kg (141 lb)   LMP 2021 (Approximate)   SpO2 98%   BMI 24.20 kg/m²      Physical Exam  Vitals reviewed.   Constitutional:       General: She is not in acute distress.     Appearance: Normal appearance. She is well-developed and normal " weight. She is not ill-appearing.   HENT:      Head: Normocephalic and atraumatic.      Right Ear: Tympanic membrane and external ear normal.      Left Ear: Tympanic membrane and external ear normal.      Nose: Nose normal.      Mouth/Throat:      Mouth: Mucous membranes are moist.   Eyes:      General:         Right eye: No discharge.         Left eye: No discharge.      Conjunctiva/sclera: Conjunctivae normal.      Pupils: Pupils are equal, round, and reactive to light.   Neck:      Thyroid: No thyromegaly.   Cardiovascular:      Rate and Rhythm: Normal rate and regular rhythm.      Heart sounds: Normal heart sounds. No murmur heard.  Pulmonary:      Effort: Pulmonary effort is normal.      Breath sounds: Normal breath sounds. No wheezing or rales.   Abdominal:      General: Bowel sounds are normal. There is no distension.      Palpations: Abdomen is soft. There is no mass.      Tenderness: There is no abdominal tenderness. There is no guarding or rebound.   Musculoskeletal:         General: No tenderness or deformity. Normal range of motion.      Cervical back: Normal range of motion and neck supple.   Lymphadenopathy:      Cervical: No cervical adenopathy.   Skin:     General: Skin is warm and dry.      Findings: No erythema or rash.   Neurological:      General: No focal deficit present.      Mental Status: She is alert and oriented to person, place, and time.      Cranial Nerves: No cranial nerve deficit.      Sensory: No sensory deficit.      Motor: No weakness or abnormal muscle tone.      Coordination: Coordination normal.      Gait: Gait normal.      Deep Tendon Reflexes: Reflexes are normal and symmetric. Reflexes normal.   Psychiatric:         Mood and Affect: Mood normal.         Behavior: Behavior normal.         Thought Content: Thought content normal.         Judgment: Judgment normal.           No results found.    Health Maintenance     Health Maintenance   Topic Date Due   • Zoster Vaccine (1 of  2) Never done   • COVID-19 Vaccine (3 - 2023-24 season) 09/01/2023   • Breast Cancer Screening: Mammogram  09/06/2023   • Influenza Vaccine (Season Ended) 09/01/2024   • Depression Screening  05/06/2025   • Annual Physical  05/06/2025   • Colorectal Cancer Screening  09/27/2025   • DTaP,Tdap,and Td Vaccines (2 - Td or Tdap) 07/24/2029   • HIV Screening  Completed   • Hepatitis C Screening  Completed   • Osteoporosis Screening  Completed   • Pneumococcal Vaccine: Pediatrics (0 to 5 Years) and At-Risk Patients (6 to 64 Years)  Aged Out   • HIB Vaccine  Aged Out   • IPV Vaccine  Aged Out   • Hepatitis A Vaccine  Aged Out   • Meningococcal ACWY Vaccine  Aged Out   • HPV Vaccine  Aged Out     Immunization History   Administered Date(s) Administered   • COVID-19 PFIZER VACCINE 0.3 ML IM 01/21/2021, 02/10/2021   • DT (pediatric) 07/19/2006   • H1N1, All Formulations 10/23/2009   • Influenza, recombinant, quadrivalent,injectable, preservative free 09/09/2020, 09/14/2021, 09/19/2022   • Tdap 07/24/2019       German Mckeon MD  [facility]

## 2024-05-13 ENCOUNTER — OFFICE VISIT (OUTPATIENT)
Dept: CARDIOLOGY CLINIC | Facility: CLINIC | Age: 59
End: 2024-05-13
Payer: COMMERCIAL

## 2024-05-13 ENCOUNTER — NURSE TRIAGE (OUTPATIENT)
Age: 59
End: 2024-05-13

## 2024-05-13 VITALS
BODY MASS INDEX: 23.39 KG/M2 | HEIGHT: 64 IN | WEIGHT: 137 LBS | OXYGEN SATURATION: 99 % | DIASTOLIC BLOOD PRESSURE: 70 MMHG | HEART RATE: 72 BPM | SYSTOLIC BLOOD PRESSURE: 112 MMHG

## 2024-05-13 DIAGNOSIS — R00.2 PALPITATIONS: Primary | ICD-10-CM

## 2024-05-13 DIAGNOSIS — Z82.49 FAMILY HISTORY OF EARLY CAD: ICD-10-CM

## 2024-05-13 DIAGNOSIS — I95.0 IDIOPATHIC HYPOTENSION: ICD-10-CM

## 2024-05-13 PROCEDURE — 99214 OFFICE O/P EST MOD 30 MIN: CPT | Performed by: INTERNAL MEDICINE

## 2024-05-13 RX ORDER — CRANBERRY FRUIT EXTRACT 200 MG
CAPSULE ORAL
Qty: 60 CAPSULE | Refills: 0 | Status: SHIPPED | OUTPATIENT
Start: 2024-05-13 | End: 2024-05-20 | Stop reason: SDUPTHER

## 2024-05-13 NOTE — TELEPHONE ENCOUNTER
"Answer Assessment - Initial Assessment Questions  1. BLOOD PRESSURE: \"What is the blood pressure?\" \"Did you take at least two measurements 5 minutes apart?\"      80/47, 90/60  2. ONSET: \"When did you take your blood pressure?\"      This morning, last night  3. HOW: \"How did you obtain the blood pressure?\" (e.g., visiting nurse, automatic home BP monitor)      Automatic arm cuff  4. HISTORY: \"Do you have a history of low blood pressure?\" \"What is your blood pressure normally?\"      no  5. MEDICATIONS: \"Are you taking any medications for blood pressure?\" If Yes, ask: \"Have they been changed recently?\"      no  6. PULSE RATE: \"Do you know what your pulse rate is?\"       60-70    Protocols used: Blood Pressure - Low-ADULT-[place]    "

## 2024-05-13 NOTE — PROGRESS NOTES
[facility]  [address] [place] [address] [id number]   [place] [address]  Cardiology Consultation    Sandi Hernandez  [id number]  1965      Consult for:  Appreciate consult by: German Mckeon MD    1. Palpitations  Echo complete w/ contrast if indicated    Stress test only, exercise    AMB extended holter monitor      2. Idiopathic hypotension  AMB extended holter monitor      3. Family history of early CAD  Red Yeast Rice Extract 600 MG CAPS         Discussion/Summary:   Hypotension/fatigue-plan to check echo 2D for LV function.  Orthostatics here were negative.  Exercise treadmill stress test to evaluate blood pressure with activities and inducible arrhythmias.  She understands to hydrate herself.  Negative screen her for sleep apnea.  Possibly related with estrogen balance?  She was noted to have hysterectomy during the summer and with recent estrogen titration off?  Possible up titration of her estrogen and slower wean    Palpitations-periodic palpitations.  Plan for extended Holter monitor to rule out atrial fibrillation or major arrhythmia.    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  She states recently having low blood pressure in the morning with associated fatigue.  She denies syncopized in.  She has been increasing sodium in her diet.  She is wearing compression when sitting and standing.  Her orthostatics here were negative.  She has had associated palpitations at times.  She is not sure if they have been irregular.  She has some family history for atrial fibrillation.  She reports her father with quadruple bypass in the 60s.  She has a very strict diet.  She denies having chest heaviness.        PMH  Total hysterctomy- August- a lot of hot flashes    Social Hx  Nurse/floral design- stand a lot  Walk    Past  Medical History:   Diagnosis Date    Abnormal uterine bleeding     Anemia 2003    Anesthesia complication     difficulty awakening and had trouble maintaing airway but didn't end up needing reintubation    Anxiety     Asthma Childhood    Back pain     Celiac disease     last assessed: 9/27/2016    Chicken pox     AS PER PT    Claustrophobia     Depression     Fibroid     GERD (gastroesophageal reflux disease)     Kidney stone     Miscarriage 1998    Peptic ulceration 12/2022    Raynaud's disease     Wears reading eyeglasses      Social History     Socioeconomic History    Marital status: /Civil Union     Spouse name: Not on file    Number of children: Not on file    Years of education: Not on file    Highest education level: Not on file   Occupational History    Not on file   Tobacco Use    Smoking status: Former     Current packs/day: 0.50     Average packs/day: 0.5 packs/day for 34.5 years (17.3 ttl pk-yrs)     Types: Cigarettes     Start date: 11/1/1989     Quit date: 6/15/1983     Passive exposure: Past    Smokeless tobacco: Never    Tobacco comments:     Quit 1989   Vaping Use    Vaping status: Never Used   Substance and Sexual Activity    Alcohol use: Not Currently     Comment: social    Drug use: Not Currently    Sexual activity: Yes     Partners: Male     Birth control/protection: Male Sterilization, Post-menopausal   Other Topics Concern    Not on file   Social History Narrative    Not on file     Social Determinants of Health     Financial Resource Strain: Not on file   Food Insecurity: Not on file   Transportation Needs: Not on file   Physical Activity: Not on file   Stress: Not on file   Social Connections: Not on file   Intimate Partner Violence: Not on file   Housing Stability: Not on file      Family History   Problem Relation Age of Onset    Heart disease Mother         Pacemaker and defibrillator CHF    Hypertension Mother     Thyroid disease Mother     Cancer Father         Bladder and  "kidney    Dementia Father     Depression Father              Heart disease Father         Defibrillator. Quadruple bypass at 62 yrs    Stroke Father     Heart disease Family     Diabetes Family     Cancer Family     Substance Abuse Neg Hx     Mental illness Neg Hx      Past Surgical History:   Procedure Laterality Date    COLONOSCOPY      ENDOMETRIAL ABLATION      HI CYSTOURETHROSCOPY N/A 2023    Procedure: CYSTOSCOPY;  Surgeon: Lydia Green MD;  Location: [facility];  Service: Gynecology    HI LAPS TOTAL HYSTERECT 250 GM/< W/RMVL TUBE/OVARY Bilateral 2023    Procedure: HYSTERECTOMY LAPAROSCOPIC TOTAL (LTH) WITH SALPINGO-OOPHERECTOMY;  Surgeon: Lydia Green MD;  Location: [facility];  Service: Gynecology    ROTATOR CUFF REPAIR Left        Current Outpatient Medications:     ALPRAZolam (Xanax) 0.25 mg tablet, Take 1 tablet (0.25 mg total) by mouth 3 (three) times a day as needed (for flying), Disp: 15 tablet, Rfl: 0    B-D INTEGRA SYRINGE 23G X 1\" 3 ML MISC, USE EVERY 30 (THIRTY) DAYS, Disp: 3 each, Rfl: 3    Cholecalciferol (VITAMIN D3 PO), Take 1 capsule by mouth in the morning, Disp: , Rfl:     cyanocobalamin 1,000 mcg/mL, INJECT 1 ML (1,000 MCG TOTAL) INTO A MUSCLE EVERY 30 (THIRTY) DAYS, Disp: 3 mL, Rfl: 6    estradiol (Vivelle-Dot) 0.0375 MG/24HR, Place 1 patch on the skin 2 (two) times a week, Disp: 8 patch, Rfl: 11    Multiple Vitamins-Minerals (ZINC PO), Take 1 tablet by mouth in the morning, Disp: , Rfl:   Allergies   Allergen Reactions    Barley Grass - Food Allergy Vomiting, Dermatitis, Dizziness, GI Intolerance, Headache, Itching, Lightheadedness and Diarrhea    Gluten Meal - Food Allergy GI Intolerance    Oat - Food Allergy Dermatitis, GI Intolerance, Headache and Irritability    Sulfamethoxazole-Trimethoprim Dizziness, Eye Swelling, Headache, Hives, Itching, Photosensitivity, Swelling and Tinnitus    Wheat Bran - Food Allergy Diarrhea, Dermatitis, GI Intolerance, " "Vomiting, Irritability, Anxiety and Headache     All wheat products     Vitals:    05/13/24 1548   BP: 110/62   BP Location: Left arm   Patient Position: Sitting   Cuff Size: Standard   Pulse: 78   SpO2: 99%   Weight: 62.1 kg (137 lb)   Height: 5' 4\" (1.626 m)       Review of Systems:   Review of Systems   Constitutional:  Positive for fatigue. Negative for activity change, appetite change, chills, diaphoresis, fever and unexpected weight change.   HENT: Negative.  Negative for congestion, dental problem, drooling, ear discharge, ear pain, facial swelling, hearing loss, mouth sores, nosebleeds, postnasal drip, rhinorrhea, sinus pressure, sinus pain, sneezing, sore throat, tinnitus, trouble swallowing and voice change.    Eyes: Negative.  Negative for photophobia, pain, redness, itching and visual disturbance.   Respiratory: Negative.  Negative for apnea, cough, choking, chest tightness, shortness of breath, wheezing and stridor.    Cardiovascular:  Positive for palpitations. Negative for chest pain and leg swelling.   Gastrointestinal: Negative.  Negative for abdominal distention, abdominal pain, anal bleeding, blood in stool, constipation, diarrhea, nausea, rectal pain and vomiting.   Endocrine: Negative.  Negative for cold intolerance, heat intolerance, polydipsia, polyphagia and polyuria.   Genitourinary: Negative.  Negative for decreased urine volume, difficulty urinating, dyspareunia, dysuria, enuresis, flank pain, frequency, genital sores, hematuria, menstrual problem, pelvic pain, urgency, vaginal bleeding, vaginal discharge and vaginal pain.   Musculoskeletal: Negative.  Negative for arthralgias, back pain, gait problem, joint swelling, myalgias, neck pain and neck stiffness.   Skin: Negative.  Negative for color change, pallor, rash and wound.   Allergic/Immunologic: Negative.  Negative for environmental allergies, food allergies and immunocompromised state.   Neurological:  Positive for weakness. Negative " "for dizziness, tremors, seizures, syncope, facial asymmetry, speech difficulty, light-headedness, numbness and headaches.        Low blood pressure   Hematological: Negative.  Negative for adenopathy. Does not bruise/bleed easily.   Psychiatric/Behavioral: Negative.  Negative for agitation, behavioral problems, confusion, decreased concentration, dysphoric mood, hallucinations, self-injury, sleep disturbance and suicidal ideas. The patient is not nervous/anxious and is not hyperactive.    All other systems reviewed and are negative.      Vitals:    05/13/24 1548   BP: 110/62   BP Location: Left arm   Patient Position: Sitting   Cuff Size: Standard   Pulse: 78   SpO2: 99%   Weight: 62.1 kg (137 lb)   Height: 5' 4\" (1.626 m)     Physical Examination:   Physical Exam  Constitutional:       General: She is not in acute distress.     Appearance: She is well-developed. She is not diaphoretic.   HENT:      Head: Normocephalic and atraumatic.      Right Ear: External ear normal.      Left Ear: External ear normal.   Eyes:      General: No scleral icterus.        Right eye: No discharge.         Left eye: No discharge.      Conjunctiva/sclera: Conjunctivae normal.      Pupils: Pupils are equal, round, and reactive to light.   Neck:      Thyroid: No thyromegaly.      Vascular: No JVD.      Trachea: No tracheal deviation.   Cardiovascular:      Rate and Rhythm: Normal rate and regular rhythm.      Heart sounds: No murmur heard.     No friction rub. Gallop present.   Pulmonary:      Effort: Pulmonary effort is normal. No respiratory distress.      Breath sounds: Normal breath sounds. No stridor. No wheezing or rales.   Chest:      Chest wall: No tenderness.   Abdominal:      General: Bowel sounds are normal. There is no distension.      Palpations: Abdomen is soft. There is no mass.      Tenderness: There is no abdominal tenderness. There is no guarding or rebound.   Musculoskeletal:         General: No tenderness or deformity. " "Normal range of motion.      Cervical back: Normal range of motion and neck supple.   Skin:     General: Skin is warm and dry.      Coloration: Skin is not pale.      Findings: No erythema or rash.   Neurological:      Mental Status: She is alert and oriented to person, place, and time.      Cranial Nerves: No cranial nerve deficit.      Motor: No abnormal muscle tone.      Coordination: Coordination normal.      Deep Tendon Reflexes: Reflexes are normal and symmetric. Reflexes normal.   Psychiatric:         Behavior: Behavior normal.         Thought Content: Thought content normal.         Judgment: Judgment normal.         Labs:     Lab Results   Component Value Date    WBC 3.8 04/18/2024    HGB 15.0 04/18/2024    HCT 45.6 04/18/2024    MCV 88 04/18/2024    RDW 11.9 04/18/2024     04/18/2024     BMP:  Lab Results   Component Value Date    SODIUM 143 04/18/2024    K 4.3 04/18/2024     04/18/2024    CO2 27 04/18/2024    BUN 17 04/18/2024    CREATININE 0.79 04/18/2024    GLUC 93 04/18/2024    GLUF 81 08/25/2023    CALCIUM 9.6 09/05/2023    EGFR 87 04/18/2024    MG 2.0 12/28/2022     LFT:  Lab Results   Component Value Date    AST 21 04/18/2024    ALT 20 04/18/2024    ALKPHOS 62 09/05/2023    TP 6.7 04/18/2024    ALB 4.6 04/18/2024      Lab Results   Component Value Date    VDU8NPOUINUW 0.689 11/29/2017     Lab Results   Component Value Date    HGBA1C 4.8 07/24/2023     Lipid Profile:   Lab Results   Component Value Date    CHOLESTEROL 233 (H) 04/18/2024    HDL 99 04/18/2024    LDLCALC 125 (H) 04/18/2024    TRIG 53 04/18/2024     Lab Results   Component Value Date    CHOLESTEROL 233 (H) 04/18/2024    CHOLESTEROL 190 08/23/2022     No results found for: \"CKTOTAL\", \"CKMB\", \"CKMBINDEX\", \"TROPONINI\"  No results found for: \"NTBNP\"   Recent Results (from the past 672 hour(s))   CBC and differential    Collection Time: 04/18/24  8:29 AM   Result Value Ref Range    White Blood Cell Count 3.8 3.4 - 10.8 x10E3/uL "    Red Blood Cell Count 5.21 3.77 - 5.28 x10E6/uL    Hemoglobin 15.0 11.1 - 15.9 g/dL    HCT 45.6 34.0 - 46.6 %    MCV 88 79 - 97 fL    MCH 28.8 26.6 - 33.0 pg    MCHC 32.9 31.5 - 35.7 g/dL    RDW 11.9 11.7 - 15.4 %    Platelet Count 307 150 - 450 x10E3/uL    Neutrophils 45 Not Estab. %    Lymphocytes 42 Not Estab. %    Monocytes 10 Not Estab. %    Eosinophils 2 Not Estab. %    Basophils PCT 1 Not Estab. %    Neutrophils (Absolute) 1.7 1.4 - 7.0 x10E3/uL    Lymphocytes (Absolute) 1.6 0.7 - 3.1 x10E3/uL    Monocytes (Absolute) 0.4 0.1 - 0.9 x10E3/uL    Eosinophils (Absolute) 0.1 0.0 - 0.4 x10E3/uL    Basophils ABS 0.1 0.0 - 0.2 x10E3/uL    Immature Granulocytes 0 Not Estab. %    Immature Granulocytes (Absolute) 0.0 0.0 - 0.1 x10E3/uL   Comprehensive metabolic panel    Collection Time: 04/18/24  8:29 AM   Result Value Ref Range    Glucose, Random 93 70 - 99 mg/dL    BUN 17 6 - 24 mg/dL    Creatinine 0.79 0.57 - 1.00 mg/dL    eGFR 87 >59 mL/min/1.73    SL AMB BUN/CREATININE RATIO 22 9 - 23    Sodium 143 134 - 144 mmol/L    Potassium 4.3 3.5 - 5.2 mmol/L    Chloride 102 96 - 106 mmol/L    CO2 27 20 - 29 mmol/L    CALCIUM 9.4 8.7 - 10.2 mg/dL    Protein, Total 6.7 6.0 - 8.5 g/dL    Albumin 4.6 3.8 - 4.9 g/dL    Globulin, Total 2.1 1.5 - 4.5 g/dL    Albumin/Globulin Ratio 2.2 1.2 - 2.2    TOTAL BILIRUBIN 0.4 0.0 - 1.2 mg/dL    Alk Phos Isoenzymes 63 44 - 121 IU/L    AST 21 0 - 40 IU/L    ALT 20 0 - 32 IU/L   Lipid panel    Collection Time: 04/18/24  8:29 AM   Result Value Ref Range    Cholesterol, Total 233 (H) 100 - 199 mg/dL    Triglycerides 53 0 - 149 mg/dL    HDL 99 >39 mg/dL    VLDL Cholesterol Calculated 9 5 - 40 mg/dL    LDL Calculated 125 (H) 0 - 99 mg/dL    T. Chol/HDL Ratio 2.4 0.0 - 4.4 ratio   TSH, 3rd generation    Collection Time: 04/18/24  8:29 AM   Result Value Ref Range    TSH 1.170 0.450 - 4.500 uIU/mL       Imaging & Testing   I have personally reviewed pertinent reports.      Cardiac Testing  "        EKG: Personally reviewed.    Normal sinus rhythm nonspecific T wave changes      Kade Caraballo MD [facility]  [phone]  Please call with any questions or suggestions    Counseling :  A description of the counseling:   Goals and Barriers:  Patient's ability to self care:  Medication side effect reviewed with patient in detail and all their questions answered.    \"Portions of the record may have been created with voice recognition software. Occasional wrong word or \"sound a like\" substitutions may have occurred due to the inherent limitations of voice recognition software. Read the chart carefully and recognize, using context, where substitutions have occurred. Please call if you have any questions. \"    "

## 2024-05-13 NOTE — TELEPHONE ENCOUNTER
Reason for Disposition  • Wants doctor to measure BP    Protocols used: Blood Pressure - Low-ADULT-OH

## 2024-05-14 ENCOUNTER — TELEPHONE (OUTPATIENT)
Dept: CARDIOLOGY CLINIC | Facility: CLINIC | Age: 59
End: 2024-05-14

## 2024-05-14 ENCOUNTER — TELEPHONE (OUTPATIENT)
Age: 59
End: 2024-05-14

## 2024-05-14 NOTE — TELEPHONE ENCOUNTER
PA for RED YEAST RICE    Submitted via    []CMM-KEY   [x][email or web address]-Case ID # NO CASE ID  []Faxed to plan   []Other website   []Phone call Case ID #     Office notes sent, clinical questions answered. Awaiting determination    Turnaround time for your insurance to make a decision on your Prior Authorization can take 7-21 business days.

## 2024-05-17 NOTE — TELEPHONE ENCOUNTER
PA for  Denied    Reason         Message sent to office clinical pool Yes    Denial letter scanned into Media Yes    Appeal started No ( Provider will need to decide if appeal is warranted and send clinical documentation to PA team for initiation.)    **Please follow up with your patient regarding denial and next steps**

## 2024-05-20 DIAGNOSIS — Z82.49 FAMILY HISTORY OF EARLY CAD: ICD-10-CM

## 2024-05-20 NOTE — TELEPHONE ENCOUNTER
I spoke with patient, she had picked up from pharmacy and it was only $13.   The pharmacy had filled it so I placed another encounter requesting refills per patient

## 2024-05-21 RX ORDER — CRANBERRY FRUIT EXTRACT 200 MG
CAPSULE ORAL
Qty: 60 CAPSULE | Refills: 5 | Status: SHIPPED | OUTPATIENT
Start: 2024-05-21

## 2024-06-03 ENCOUNTER — HOSPITAL ENCOUNTER (OUTPATIENT)
Dept: RADIOLOGY | Facility: HOSPITAL | Age: 59
Discharge: HOME | End: 2024-06-03
Payer: COMMERCIAL

## 2024-06-03 VITALS — WEIGHT: 137 LBS | HEIGHT: 64 IN | BODY MASS INDEX: 23.39 KG/M2

## 2024-06-03 DIAGNOSIS — Z12.31 ENCOUNTER FOR SCREENING MAMMOGRAM FOR BREAST CANCER: ICD-10-CM

## 2024-06-03 PROCEDURE — 77063 BREAST TOMOSYNTHESIS BI: CPT

## 2024-06-03 PROCEDURE — 77067 SCR MAMMO BI INCL CAD: CPT

## 2024-06-05 ENCOUNTER — CLINICAL SUPPORT (OUTPATIENT)
Dept: CARDIOLOGY CLINIC | Facility: CLINIC | Age: 59
End: 2024-06-05
Payer: COMMERCIAL

## 2024-06-05 DIAGNOSIS — I95.0 IDIOPATHIC HYPOTENSION: ICD-10-CM

## 2024-06-05 DIAGNOSIS — R00.2 PALPITATIONS: ICD-10-CM

## 2024-06-05 PROCEDURE — 93248 EXT ECG>7D<15D REV&INTERPJ: CPT | Performed by: INTERNAL MEDICINE

## 2024-06-15 DIAGNOSIS — Z00.6 ENCOUNTER FOR EXAMINATION FOR NORMAL COMPARISON OR CONTROL IN CLINICAL RESEARCH PROGRAM: ICD-10-CM

## 2024-06-24 ENCOUNTER — HOSPITAL ENCOUNTER (OUTPATIENT)
Dept: NON INVASIVE DIAGNOSTICS | Facility: HOSPITAL | Age: 59
Discharge: HOME | End: 2024-06-24
Attending: INTERNAL MEDICINE
Payer: COMMERCIAL

## 2024-06-24 ENCOUNTER — APPOINTMENT (OUTPATIENT)
Dept: LAB | Facility: HOSPITAL | Age: 59
End: 2024-06-24
Payer: COMMERCIAL

## 2024-06-24 VITALS
WEIGHT: 136.91 LBS | HEIGHT: 64 IN | DIASTOLIC BLOOD PRESSURE: 78 MMHG | SYSTOLIC BLOOD PRESSURE: 134 MMHG | HEART RATE: 78 BPM | BODY MASS INDEX: 23.37 KG/M2

## 2024-06-24 DIAGNOSIS — Z00.6 ENCOUNTER FOR EXAMINATION FOR NORMAL COMPARISON OR CONTROL IN CLINICAL RESEARCH PROGRAM: ICD-10-CM

## 2024-06-24 DIAGNOSIS — R00.2 PALPITATIONS: ICD-10-CM

## 2024-06-24 LAB
AORTIC ROOT: 2.9 CM
APICAL FOUR CHAMBER EJECTION FRACTION: 69 %
BSA FOR ECHO PROCEDURE: 1.67 M2
CHEST PAIN STATEMENT: NORMAL
E WAVE DECELERATION TIME: 184 MS
E/A RATIO: 0.7
FRACTIONAL SHORTENING: 32 (ref 28–44)
INTERVENTRICULAR SEPTUM IN DIASTOLE (PARASTERNAL SHORT AXIS VIEW): 0.7 CM
INTERVENTRICULAR SEPTUM: 0.7 CM (ref 0.6–1.1)
LAAS-AP2: 16.1 CM2
LAAS-AP4: 21.2 CM2
LEFT ATRIUM SIZE: 3.7 CM
LEFT ATRIUM VOLUME (MOD BIPLANE): 54 ML
LEFT ATRIUM VOLUME INDEX (MOD BIPLANE): 32.3 ML/M2
LEFT INTERNAL DIMENSION IN SYSTOLE: 3 CM (ref 2.1–4)
LEFT VENTRICULAR INTERNAL DIMENSION IN DIASTOLE: 4.4 CM (ref 3.5–6)
LEFT VENTRICULAR POSTERIOR WALL IN END DIASTOLE: 0.9 CM
LEFT VENTRICULAR STROKE VOLUME: 52 ML
LVSV (TEICH): 52 ML
MAX DIASTOLIC BP: 70 MMHG
MAX HR PERCENT: 102 %
MAX HR: 166 BPM
MAX PREDICTED HEART RATE: 162 BPM
MV E'TISSUE VEL-LAT: 10 CM/S
MV E'TISSUE VEL-SEP: 9 CM/S
MV PEAK A VEL: 1.11 M/S
MV PEAK E VEL: 78 CM/S
MV STENOSIS PRESSURE HALF TIME: 53 MS
MV VALVE AREA P 1/2 METHOD: 4.15
PROTOCOL NAME: NORMAL
RATE PRESSURE PRODUCT: NORMAL
REASON FOR TERMINATION: NORMAL
RIGHT ATRIUM AREA SYSTOLE A4C: 11.6 CM2
RIGHT VENTRICLE ID DIMENSION: 3.1 CM
SL CV LEFT ATRIUM LENGTH A2C: 5.1 CM
SL CV PED ECHO LEFT VENTRICLE DIASTOLIC VOLUME (MOD BIPLANE) 2D: 87 ML
SL CV PED ECHO LEFT VENTRICLE SYSTOLIC VOLUME (MOD BIPLANE) 2D: 34 ML
SL CV STRESS RECOVERY BP: NORMAL MMHG
SL CV STRESS RECOVERY HR: 90 BPM
SL CV STRESS RECOVERY O2 SAT: 98 %
SL CV STRESS STAGE REACHED: 4
STRESS ANGINA INDEX: 0
STRESS BASELINE BP: NORMAL MMHG
STRESS BASELINE HR: 75 BPM
STRESS O2 SAT REST: 96 %
STRESS PEAK HR: 150 BPM
STRESS POST ESTIMATED WORKLOAD: 12 METS
STRESS POST EXERCISE DUR MIN: 9 MIN
STRESS POST EXERCISE DUR MIN: 9 MIN
STRESS POST EXERCISE DUR SEC: 36 SEC
STRESS POST EXERCISE DUR SEC: 36 SEC
STRESS POST O2 SAT PEAK: 97 %
STRESS POST PEAK BP: 130 MMHG
STRESS POST PEAK HR: 166 BPM
STRESS POST PEAK SYSTOLIC BP: 134 MMHG
TARGET HR FORMULA: NORMAL
TEST INDICATION: NORMAL
TR MAX PG: 22 MMHG
TR PEAK VELOCITY: 2.3 M/S
TRICUSPID ANNULAR PLANE SYSTOLIC EXCURSION: 2.1 CM
TRICUSPID VALVE PEAK REGURGITATION VELOCITY: 2.34 M/S

## 2024-06-24 PROCEDURE — 93306 TTE W/DOPPLER COMPLETE: CPT | Performed by: INTERNAL MEDICINE

## 2024-06-24 PROCEDURE — 93018 CV STRESS TEST I&R ONLY: CPT | Performed by: INTERNAL MEDICINE

## 2024-06-24 PROCEDURE — 93306 TTE W/DOPPLER COMPLETE: CPT

## 2024-06-24 PROCEDURE — 93016 CV STRESS TEST SUPVJ ONLY: CPT | Performed by: INTERNAL MEDICINE

## 2024-06-24 PROCEDURE — 93017 CV STRESS TEST TRACING ONLY: CPT

## 2024-06-24 PROCEDURE — 36415 COLL VENOUS BLD VENIPUNCTURE: CPT

## 2024-06-25 ENCOUNTER — TELEPHONE (OUTPATIENT)
Dept: CARDIOLOGY CLINIC | Facility: CLINIC | Age: 59
End: 2024-06-25

## 2024-06-25 NOTE — TELEPHONE ENCOUNTER
----- Message from Kade Caraballo MD sent at 6/25/2024 12:53 PM EDT -----  Stress test was negative for any inducible arrhythmias or evidence of ischemia.  Controlled blood pressure with exercise

## 2024-06-25 NOTE — TELEPHONE ENCOUNTER
----- Message from Kade Caraballo MD sent at 6/25/2024 12:52 PM EDT -----  Overall heart function normal.  No major valvular abnormalities

## 2024-06-25 NOTE — TELEPHONE ENCOUNTER
----- Message from Kade Caraballo MD sent at 6/25/2024 12:51 PM EDT -----  Patient had a min HR of 49 bpm, max HR of 197 bpm, and avg HR of 70 bpm. Predominant underlying rhythm was Sinus Rhythm. 20 Supraventricular Tachycardia runs occurred, the run with the fastest interval lasting 5 beats with a max rate of 197 bpm, the longest lasting 13 beats with an avg rate of 111 bpm. Supraventricular Tachycardia was detected within +/- 45 seconds of symptomatic patient event(s). Some episodes of Supraventricular Tachycardia may be possible Atrial Tachycardia with variable block. Isolated SVEs were rare (<1.0%), SVE Couplets were rare (<1.0%), and SVE Triplets were rare (<1.0%). Isolated VEs were rare (<1.0%, 317), VE Couplets were rare (<1.0%, 2), and VE Triplets were rare (<1.0%, 2).       Final interpretation: Average heart rate 70 bpm.  She had short bursts of some fast SVT but no sustained arrhythmias.  The longest event was 45 seconds.  Clinical correlation on cardiology follow-up

## 2024-07-29 ENCOUNTER — OFFICE VISIT (OUTPATIENT)
Dept: CARDIOLOGY CLINIC | Facility: CLINIC | Age: 59
End: 2024-07-29
Payer: COMMERCIAL

## 2024-07-29 VITALS
BODY MASS INDEX: 23.56 KG/M2 | HEART RATE: 77 BPM | OXYGEN SATURATION: 99 % | WEIGHT: 138 LBS | HEIGHT: 64 IN | DIASTOLIC BLOOD PRESSURE: 80 MMHG | SYSTOLIC BLOOD PRESSURE: 140 MMHG

## 2024-07-29 DIAGNOSIS — Z90.710 S/P HYSTERECTOMY WITH OOPHORECTOMY: ICD-10-CM

## 2024-07-29 DIAGNOSIS — I95.0 IDIOPATHIC HYPOTENSION: ICD-10-CM

## 2024-07-29 DIAGNOSIS — I47.10 PSVT (PAROXYSMAL SUPRAVENTRICULAR TACHYCARDIA): Primary | ICD-10-CM

## 2024-07-29 DIAGNOSIS — Z90.721 S/P HYSTERECTOMY WITH OOPHORECTOMY: ICD-10-CM

## 2024-07-29 PROCEDURE — 99214 OFFICE O/P EST MOD 30 MIN: CPT | Performed by: INTERNAL MEDICINE

## 2024-07-29 NOTE — ASSESSMENT & PLAN NOTE
Was placed on estrogen supplements postoperatively, stopped about a week and a half ago after discussing with her prior cardiologist, Dr. Caraballo

## 2024-07-29 NOTE — PROGRESS NOTES
[facility]  Follow up note  Sandi Hernandez 58 y.o. female MRN: [id number]        1. PSVT (paroxysmal supraventricular tachycardia)  Assessment & Plan:  Multiple nonsustained but symptomatic episodes noted on recent Zio patch 6/24.  2. S/P hysterectomy with oophorectomy  Assessment & Plan:  Was placed on estrogen supplements postoperatively, stopped about a week and a half ago after discussing with her prior cardiologist, Dr. Caraballo  3. Idiopathic hypotension  Assessment & Plan:  I suspect this might have something to do with 55 pound weight loss in the last couple years with weight watchers, combined with hysterectomy/oophorectomy and estrogen supplementation that started last fall.  She also consumes an incredibly low sodium diet.    Plan:    Remain off of estrogen supplements, she is only been off of them a week and a half, I think it will take longer to understand if this had an impact on her hemodynamically.  Continue sodium supplementation with the element supplements she is taking, and add additional sodium to dinner.  Keep drinking 64 ounces of fluid a day  If symptoms of lightheadedness, a.m. low blood pressures continue beyond another month, I asked her to message me in [employer] I will discuss next steps, possibly a morning dose of midodrine temporarily.  It is helpful to know that all of her cardiovascular testing has been pretty much normal including a stress test and an echo.  Her current low blood pressure especially in the mornings is a bit of a contraindication to adding beta-blocker treatment for brief nonsustained but symptomatic SVT, depending on how her blood pressure progresses, we can discuss this is at a future time.  With regards to her family history of heart disease in her dad at 60s, we will certainly continue her overall risk assessment, but at this point at 58 with a risk score of 2%, she is technically quite low risk and needs no further testing at this time.      HPI:    Sandi Hernandez is a 58 y.o. year old female who had been having issues with menstrual cycle continuing into her mid to late 50s, with concerns from GYN status post hysterectomy and oophorectomy last year, unfortunately complicated by wound infection, developing significant postmenopausal type symptoms that was treated with estrogen supplementation, unfortunately in May started having issues with low blood pressure, confirmed at home with her home blood pressure cuff, correlating with symptoms of hypotension with lightheadedness, feeling fuzzy/hazy.  She started adding some sodium supplementation to her diet.  She had traditionally been on a very low sodium diet.  She also lost 55 pounds with weight watchers recently.  Cardiovascular testing was ordered by Dr. Caraballo, stress test was normal at 9-1/2 minutes, echo was normal, Zio patch showed brief nonsustained runs of PSVT that correlate with symptoms.  She has had the symptoms for many years, recently slightly worse.  They resolve often with a cough or Valsalva maneuver.      Review of Systems   Constitutional:  Negative for appetite change, diaphoresis, fatigue and fever.   Respiratory:  Negative for chest tightness, shortness of breath and wheezing.    Cardiovascular:  Positive for palpitations. Negative for chest pain and leg swelling.   Gastrointestinal:  Negative for abdominal pain and blood in stool.   Musculoskeletal:  Negative for arthralgias and joint swelling.   Skin:  Negative for rash.   Neurological:  Positive for dizziness and light-headedness. Negative for syncope.       Past Medical History:   Diagnosis Date   • Abnormal uterine bleeding    • Anemia 2003   • Anesthesia complication     difficulty awakening and had trouble maintaing airway but didn't end up needing reintubation   • Anxiety    • Asthma Childhood   • Back pain    • Celiac disease     last assessed: 9/27/2016   • Chicken pox     AS PER PT   • Claustrophobia    • Depression    •  "Fibroid    • GERD (gastroesophageal reflux disease)    • Kidney stone    • Miscarriage 1998   • Peptic ulceration 12/2022   • Raynaud's disease    • Wears reading eyeglasses      Social History     Substance and Sexual Activity   Alcohol Use Not Currently    Comment: social     Social History     Substance and Sexual Activity   Drug Use Not Currently     Social History     Tobacco Use   Smoking Status Former   • Current packs/day: 0.50   • Average packs/day: 0.5 packs/day for 34.7 years (17.4 ttl pk-yrs)   • Types: Cigarettes   • Start date: 11/1/1989   • Quit date: 6/15/1983   • Passive exposure: Past   Smokeless Tobacco Never   Tobacco Comments    Quit 1989       Allergies:  Allergies   Allergen Reactions   • Barley Grass - Food Allergy Vomiting, Dermatitis, Dizziness, GI Intolerance, Headache, Itching, Lightheadedness and Diarrhea   • Gluten Meal - Food Allergy GI Intolerance   • Oat - Food Allergy Dermatitis, GI Intolerance, Headache and Irritability   • Sulfamethoxazole-Trimethoprim Dizziness, Eye Swelling, Headache, Hives, Itching, Photosensitivity, Swelling and Tinnitus   • Wheat Bran - Food Allergy Diarrhea, Dermatitis, GI Intolerance, Vomiting, Irritability, Anxiety and Headache     All wheat products       Medications:     Current Outpatient Medications:   •  ALPRAZolam (Xanax) 0.25 mg tablet, Take 1 tablet (0.25 mg total) by mouth 3 (three) times a day as needed (for flying), Disp: 15 tablet, Rfl: 0  •  B-D INTEGRA SYRINGE 23G X 1\" 3 ML MISC, USE EVERY 30 (THIRTY) DAYS, Disp: 3 each, Rfl: 3  •  Cholecalciferol (VITAMIN D3 PO), Take 1 capsule by mouth in the morning, Disp: , Rfl:   •  cyanocobalamin 1,000 mcg/mL, INJECT 1 ML (1,000 MCG TOTAL) INTO A MUSCLE EVERY 30 (THIRTY) DAYS, Disp: 3 mL, Rfl: 6  •  Multiple Vitamins-Minerals (ZINC PO), Take 1 tablet by mouth in the morning, Disp: , Rfl:       Vitals:    07/29/24 1028   BP: 140/80   Pulse: 77   SpO2: 99%     Weight (last 2 days)     Date/Time Weight    " "07/29/24 1028 62.6 (138)     Weight: Verbal weight at 07/29/24 1028        Physical Exam  Constitutional:       General: She is not in acute distress.     Appearance: Normal appearance. She is not diaphoretic.   HENT:      Head: Normocephalic and atraumatic.   Eyes:      General: No scleral icterus.     Conjunctiva/sclera: Conjunctivae normal.   Neck:      Vascular: No JVD.   Cardiovascular:      Rate and Rhythm: Normal rate and regular rhythm.      Heart sounds: Normal heart sounds. No murmur heard.  Pulmonary:      Effort: Pulmonary effort is normal. No respiratory distress.      Breath sounds: Normal breath sounds. No wheezing, rhonchi or rales.   Musculoskeletal:         General: No tenderness.      Right lower leg: Normal. No edema.      Left lower leg: Normal. No edema.   Skin:     General: Skin is warm and dry.   Neurological:      Mental Status: She is alert. Mental status is at baseline.         Laboratory Studies:    Laboratory studies personally reviewed    Cardiac testing:     EKG reviewed personally:   No results found for this visit on 07/29/24.      Echocardiogram:  6/24-normal study    Stress tests:  6/24-normal study, 9-1/2 minutes    Catheterization:      Holter:   6/24-PSVT episodes correlating with symptoms, nothing sustained, longest episode 13 beats.  Otherwise low ectopy burden less than 1%    Hakan Sexton MD    Portions of the record may have been created with voice recognition software.  Occasional wrong word or \"sound a like\" substitutions may have occurred due to the inherent limitations of voice recognition software.  Read the chart carefully and recognize, using context, where substitutions have occurred.  "

## 2024-07-29 NOTE — ASSESSMENT & PLAN NOTE
I suspect this might have something to do with 55 pound weight loss in the last couple years with weight watchers, combined with hysterectomy/oophorectomy and estrogen supplementation that started last fall.  She also consumes an incredibly low sodium diet.

## 2024-08-01 LAB
APOB+LDLR+PCSK9 GENE MUT ANL BLD/T: NOT DETECTED
BRCA1+BRCA2 DEL+DUP + FULL MUT ANL BLD/T: NOT DETECTED
MLH1+MSH2+MSH6+PMS2 GN DEL+DUP+FUL M: NOT DETECTED

## 2024-09-12 ENCOUNTER — HOSPITAL ENCOUNTER (OUTPATIENT)
Dept: RADIOLOGY | Facility: HOSPITAL | Age: 59
Discharge: HOME | End: 2024-09-12
Payer: COMMERCIAL

## 2024-09-12 VITALS — HEIGHT: 64 IN | WEIGHT: 138 LBS | BODY MASS INDEX: 23.56 KG/M2

## 2024-09-12 DIAGNOSIS — Z78.0 ASYMPTOMATIC MENOPAUSE: ICD-10-CM

## 2024-09-12 DIAGNOSIS — Z13.820 SCREENING FOR OSTEOPOROSIS: ICD-10-CM

## 2024-09-12 PROCEDURE — 77080 DXA BONE DENSITY AXIAL: CPT

## 2024-09-13 ENCOUNTER — TELEPHONE (OUTPATIENT)
Age: 59
End: 2024-09-13

## 2024-09-13 NOTE — RESULT ENCOUNTER NOTE
Patient notified results and recommendations, verbalized understanding of same. No further action needed.

## 2024-09-13 NOTE — TELEPHONE ENCOUNTER
Pt called to see if there was any availability with  but provider has nothing until 8/2025. Pt is on the wait list.

## 2024-09-16 NOTE — DISCHARGE INSTRUCTIONS
You were seen in the ED for abdominal pain  You had blood work, CT of the abdomen pelvis, ultrasound of your abdomen showing no significant abnormalities to explain your symptoms  You were diagnosed with nonspecific abdominal pain, likely gastritis  You have been discharged with Carafate to be taken with meals and before sleeping, as well as Protonix to be taken twice daily  Please follow up with your primary care physician within the next 1 week for continued management of your conditions  Please come back to the ED if you develop uncontrollable pain, nausea or vomiting  Thank you very much for utilizing the ED this evening  [Follow-Up] : a follow-up visit

## 2025-01-02 DIAGNOSIS — E53.8 B12 DEFICIENCY: ICD-10-CM

## 2025-01-03 RX ORDER — CYANOCOBALAMIN 1000 UG/ML
1000 INJECTION, SOLUTION INTRAMUSCULAR; SUBCUTANEOUS
Qty: 3 ML | Refills: 6 | Status: SHIPPED | OUTPATIENT
Start: 2025-01-03

## 2025-01-03 NOTE — TELEPHONE ENCOUNTER
Requested medication(s) are due for refill today: Yes  Patient has already received a courtesy refill: No  Other reason request has been forwarded to provider:  Medication not assigned to a protocol, review manually.

## 2025-01-22 ENCOUNTER — TELEPHONE (OUTPATIENT)
Age: 60
End: 2025-01-22

## 2025-01-22 DIAGNOSIS — I47.10 PSVT (PAROXYSMAL SUPRAVENTRICULAR TACHYCARDIA) (HCC): Primary | ICD-10-CM

## 2025-01-22 NOTE — TELEPHONE ENCOUNTER
I have ordered this, just let her know she can go ahead and schedule it, and this does not go through insurance, there is a $99 co-pay.

## 2025-01-22 NOTE — TELEPHONE ENCOUNTER
Called pt back to gather more information.  Pt is not having any new symptoms since last OV 7/29/24.  She states she has low BP at times and runs of SVT.  Pt is currently asymptomatic but because of her strong family history of cardiac disease would like to have a coronary calcium score done.  Her father had a CABG x's4 and mother has CHF.  Please advise when testing is ordered so she can schedule with central scheduling.  She is aware of the cost without insurance coverage. Thank you.

## 2025-01-22 NOTE — TELEPHONE ENCOUNTER
Caller: Sandi Hernandez    Doctor: Dr. Sexton    Reason for call: Patient called requesting Dr. Sexton order a coronary calcium scan for her.  Please inform patient when this is available to schedule.    Call back#: [phone]

## 2025-02-22 ENCOUNTER — HOSPITAL ENCOUNTER (OUTPATIENT)
Dept: CT IMAGING | Facility: HOSPITAL | Age: 60
Discharge: HOME | End: 2025-02-22
Attending: INTERNAL MEDICINE
Payer: COMMERCIAL

## 2025-02-22 DIAGNOSIS — I47.10 PSVT (PAROXYSMAL SUPRAVENTRICULAR TACHYCARDIA) (HCC): ICD-10-CM

## 2025-02-22 PROCEDURE — 75571 CT HRT W/O DYE W/CA TEST: CPT

## 2025-03-03 ENCOUNTER — RESULTS FOLLOW-UP (OUTPATIENT)
Dept: CARDIOLOGY CLINIC | Facility: CLINIC | Age: 60
End: 2025-03-03

## 2025-03-06 ENCOUNTER — TELEPHONE (OUTPATIENT)
Age: 60
End: 2025-03-06

## 2025-03-06 NOTE — TELEPHONE ENCOUNTER
Calling to see if Dr. Bangura had any openings this week or next week. Next available appt is in April. Patient will schedule on line later no further questions at this time

## 2025-03-25 ENCOUNTER — OFFICE VISIT (OUTPATIENT)
Age: 60
End: 2025-03-25
Payer: COMMERCIAL

## 2025-03-25 DIAGNOSIS — N95.1 MENOPAUSAL SYMPTOMS: Primary | ICD-10-CM

## 2025-03-25 PROBLEM — I73.00 RAYNAUD PHENOMENON: Status: RESOLVED | Noted: 2022-12-14 | Resolved: 2025-03-25

## 2025-03-25 PROBLEM — D48.0 NEOPLASM OF UNCERTAIN BEHAVIOR OF BONE: Status: RESOLVED | Noted: 2019-07-24 | Resolved: 2025-03-25

## 2025-03-25 PROCEDURE — 99215 OFFICE O/P EST HI 40 MIN: CPT | Performed by: OBSTETRICS & GYNECOLOGY

## 2025-03-25 RX ORDER — ESTRADIOL 0.04 MG/D
1 PATCH, EXTENDED RELEASE TRANSDERMAL 2 TIMES WEEKLY
COMMUNITY

## 2025-03-25 RX ORDER — PROGESTERONE 100 MG/1
100 CAPSULE ORAL
Qty: 30 CAPSULE | Refills: 11 | Status: SHIPPED | OUTPATIENT
Start: 2025-03-25

## 2025-03-31 NOTE — PROGRESS NOTES
:  Assessment & Plan  Menopausal symptoms    Orders:    Progesterone 100 MG CAPS; Take 100 mg by mouth at bedtime      1) I discussed the long term health benefits of E2/PG, including: reduction of CVD risk, reduction of hyperlipidemia, reduction of DM and GLP-1 agonist activity with mild appetite suppression; reduction of colon CA, osteoporosis and cognitive decline, Alzheimer's disease.  2) I still recommend progesterone replacement for hysterectomized women for breast protection against breast cancer as well as fantastic symptom resolution of sleep disturbance, mood, anxiety and agitation.   3) I personally don't think the estradiol had anything to do with hypotension episodes. Oral progesterone actually may help combat this.   4) She is open to trying progesterone 100 mg nightly with same dose of estradiol. Side effect sleepiness.   5) Consider adding estradiol vaginal cream for vaginal atrophy,hold for now.Email with progress report in a month.  6) Follow up one year or prn.     This was a 45 minute visit with greater than 50% of time spent in face to face counseling and coordination of care      Sandi presents for hormone consult, her first visit with me;self referred, sees Dr. Green for gyn care with [facility]  Sandi is menopausal  over 7 years abo. S/p TLH/BSO in 2023 due to AUB/adenomyosis, sustaining a postop infection. Since then:  1) Hot flashes a problem, given estradiol patch 0.05 mg, felt it was too strong. Dose reduced to 0.0375, feels much better.  2) Weight gain, although recently lost 50 lbs with Weight Watchers!  3) Low BP, cardio thought it may be hormonal, went off the patch. BP fine.  4) Vaginal dryness.   PMXH: celiac disease  SHX: denies tobacco, ETOH  FHX: Mom alive 91, CHF. MGM old age. MGF suicide. Dad COG Afib, 83. PGM COPD; PGF COPD. 4 siblings, 2 with cardiac issues, arrhythmia. 3 kids 31/29/25, healthy.    Review of Systems   Constitutional: Negative.    Gastrointestinal: Negative.     Endocrine: Positive for heat intolerance.   Genitourinary:  Positive for dyspareunia and vaginal pain.   Musculoskeletal: Negative.    Skin: Negative.    Neurological:  Positive for dizziness.   Psychiatric/Behavioral: Negative.            Physical Exam  Constitutional:       Appearance: Normal appearance. She is normal weight.   Neurological:      Mental Status: She is alert.

## 2025-04-16 ENCOUNTER — OFFICE VISIT (OUTPATIENT)
Dept: CARDIOLOGY CLINIC | Facility: CLINIC | Age: 60
End: 2025-04-16
Payer: COMMERCIAL

## 2025-04-16 VITALS
DIASTOLIC BLOOD PRESSURE: 74 MMHG | HEART RATE: 72 BPM | OXYGEN SATURATION: 100 % | WEIGHT: 148 LBS | BODY MASS INDEX: 25.4 KG/M2 | SYSTOLIC BLOOD PRESSURE: 132 MMHG

## 2025-04-16 DIAGNOSIS — E78.00 HYPERCHOLESTEROLEMIA: ICD-10-CM

## 2025-04-16 DIAGNOSIS — I95.0 IDIOPATHIC HYPOTENSION: ICD-10-CM

## 2025-04-16 DIAGNOSIS — I47.10 PSVT (PAROXYSMAL SUPRAVENTRICULAR TACHYCARDIA) (HCC): Primary | ICD-10-CM

## 2025-04-16 DIAGNOSIS — R93.1 AGATSTON CAC SCORE, <100: ICD-10-CM

## 2025-04-16 PROCEDURE — 99214 OFFICE O/P EST MOD 30 MIN: CPT | Performed by: INTERNAL MEDICINE

## 2025-04-16 RX ORDER — ROSUVASTATIN CALCIUM 5 MG/1
5 TABLET, COATED ORAL DAILY
Qty: 90 TABLET | Refills: 3 | Status: SHIPPED | OUTPATIENT
Start: 2025-04-16

## 2025-04-16 NOTE — ASSESSMENT & PLAN NOTE
Score of 36, she was surprised by this, she has a very healthy lifestyle, both by exercise and diet.  But her family history is likely to blame, dad had a CABG at age 62

## 2025-04-16 NOTE — ASSESSMENT & PLAN NOTE
She is mildly symptomatic, with the understanding that this is not a high risk arrhythmia, she would prefer to continue monitoring

## 2025-04-16 NOTE — H&P (VIEW-ONLY)
[facility]  Follow up note  Sandi Hernandez 59 y.o. female MRN: [id number]      Assessment & Plan  PSVT (paroxysmal supraventricular tachycardia) (HCC)  She is mildly symptomatic, with the understanding that this is not a high risk arrhythmia, she would prefer to continue monitoring  Idiopathic hypotension  This resulted after hysterectomy, in the context of estrogen use last year, but has resolved  Hypercholesterolemia  LDLs have been anywhere from 102-125, HDL is high, theoretically protective, but a calcium score scan was elevated at 36  With her family history we discussed LPA assessment, and at this point treating with low-dose statin is likely the best plan, rosuvastatin 5 mg  Agatston CAC score, <100  Score of 36, she was surprised by this, she has a very healthy lifestyle, both by exercise and diet.  But her family history is likely to blame, dad had a CABG at age 62       Plan:    Rosuvastatin 5 mg, check lipids in 3 months  Check lipoprotein a  Repeat lipids in a year and annual follow-up      HPI:   Sandi Hernandez is a 59 y.o. year old female who follows up for cardiovascular preventive health, with celiac disease, still porosis, with mild hypercholesterolemia with  up to 125, high HDL, strong family history of premature heart disease especially in dad with a CABG at 62, a retired nurse, who also saw me last summer for idiopathic hypotension after hysterectomy for postmenopausal ongoing menstrual cycling.  Her hypotension appears to have resolved.  She has PSVT, symptoms are recurrent, fairly frequent but mostly nuisance.  She underwent a calcium score for further risk assessment, she has a score of 36, this was discussed in detail today.      Review of Systems   Constitutional:  Negative for appetite change, diaphoresis, fatigue and fever.   Respiratory:  Negative for chest tightness, shortness of breath and wheezing.    Cardiovascular:  Positive for palpitations. Negative for chest  pain and leg swelling.   Gastrointestinal:  Negative for abdominal pain and blood in stool.   Musculoskeletal:  Negative for arthralgias and joint swelling.   Skin:  Negative for rash.   Neurological:  Negative for dizziness, syncope and light-headedness.       Past Medical History:   Diagnosis Date   • Abnormal uterine bleeding    • Anemia 2003   • Anesthesia complication     difficulty awakening and had trouble maintaing airway but didn't end up needing reintubation   • Anxiety    • Asthma Childhood   • Back pain    • Celiac disease     last assessed: 9/27/2016   • Chicken pox     AS PER PT   • Claustrophobia    • Depression    • Fibroid    • GERD (gastroesophageal reflux disease)    • Kidney stone    • Miscarriage 1998   • Peptic ulceration 12/2022   • Raynaud's disease    • Wears reading eyeglasses      Social History     Substance and Sexual Activity   Alcohol Use Not Currently    Comment: social     Social History     Substance and Sexual Activity   Drug Use Not Currently     Social History     Tobacco Use   Smoking Status Former   • Current packs/day: 0.50   • Average packs/day: 0.5 packs/day for 35.5 years (17.7 ttl pk-yrs)   • Types: Cigarettes   • Start date: 11/1/1989   • Quit date: 6/15/1983   • Passive exposure: Past   Smokeless Tobacco Never   Tobacco Comments    Quit 1989       Allergies:  Allergies   Allergen Reactions   • Barley Grass - Food Allergy Vomiting, Dermatitis, Dizziness, GI Intolerance, Headache, Itching, Lightheadedness and Diarrhea   • Gluten Meal - Food Allergy GI Intolerance   • Oat - Food Allergy Dermatitis, GI Intolerance, Headache and Irritability   • Sulfamethoxazole-Trimethoprim Dizziness, Eye Swelling, Headache, Hives, Itching, Photosensitivity, Swelling and Tinnitus   • Wheat Bran - Food Allergy Diarrhea, Dermatitis, GI Intolerance, Vomiting, Irritability, Anxiety and Headache     All wheat products       Medications:     Current Outpatient Medications:   •  ALPRAZolam (Xanax)  "0.25 mg tablet, Take 1 tablet (0.25 mg total) by mouth 3 (three) times a day as needed (for flying), Disp: 15 tablet, Rfl: 0  •  B-D INTEGRA SYRINGE 23G X 1\" 3 ML MISC, USE EVERY 30 (THIRTY) DAYS, Disp: 3 each, Rfl: 3  •  Cholecalciferol (VITAMIN D3 PO), Take 1 capsule by mouth in the morning, Disp: , Rfl:   •  cyanocobalamin 1,000 mcg/mL, INJECT 1 ML (1,000 MCG TOTAL) INTO A MUSCLE EVERY 30 (THIRTY) DAYS, Disp: 3 mL, Rfl: 6  •  estradiol (VIVELLE-DOT) 0.0375 MG/24HR, Place 1 patch on the skin 2 (two) times a week, Disp: , Rfl:   •  Multiple Vitamins-Minerals (ZINC PO), Take 1 tablet by mouth in the morning, Disp: , Rfl:   •  Progesterone 100 MG CAPS, Take 100 mg by mouth at bedtime, Disp: 30 capsule, Rfl: 11  •  rosuvastatin (CRESTOR) 5 mg tablet, Take 1 tablet (5 mg total) by mouth daily, Disp: 90 tablet, Rfl: 3      Vitals:    04/16/25 0953   BP: 132/74   Pulse: 72   SpO2: 100%     Weight (last 2 days)     Date/Time Weight    04/16/25 0953 67.1 (148)     Weight: verbal at 04/16/25 0953        Physical Exam  Constitutional:       General: She is not in acute distress.     Appearance: Normal appearance. She is not diaphoretic.   HENT:      Head: Normocephalic and atraumatic.   Eyes:      General: No scleral icterus.     Conjunctiva/sclera: Conjunctivae normal.   Neck:      Vascular: No JVD.   Cardiovascular:      Rate and Rhythm: Normal rate and regular rhythm.      Heart sounds: Normal heart sounds. No murmur heard.  Pulmonary:      Effort: Pulmonary effort is normal. No respiratory distress.      Breath sounds: Normal breath sounds. No wheezing, rhonchi or rales.   Musculoskeletal:         General: No tenderness.      Right lower leg: Normal. No edema.      Left lower leg: Normal. No edema.   Skin:     General: Skin is warm and dry.   Neurological:      Mental Status: She is alert. Mental status is at baseline.         Laboratory Studies:    Labs personally reviewed    Cardiac testing:     EKG reviewed " "personally:   No results found for this visit on 04/16/25.      Echocardiogram:  6/24-normal study    Stress tests:  6/24-normal study, 9-1/2 minutes    Catheterization:      Holter:   6/24-PSVT episodes correlating with symptoms, nothing sustained, longest episode 13 beats.  Otherwise low ectopy burden less than 1%    Hakan Sexton MD    Portions of the record may have been created with voice recognition software.  Occasional wrong word or \"sound a like\" substitutions may have occurred due to the inherent limitations of voice recognition software.  Read the chart carefully and recognize, using context, where substitutions have occurred.  "

## 2025-04-16 NOTE — PROGRESS NOTES
[facility]  Follow up note  Sandi Hernandez 59 y.o. female MRN: [id number]      Assessment & Plan  PSVT (paroxysmal supraventricular tachycardia) (HCC)  She is mildly symptomatic, with the understanding that this is not a high risk arrhythmia, she would prefer to continue monitoring  Idiopathic hypotension  This resulted after hysterectomy, in the context of estrogen use last year, but has resolved  Hypercholesterolemia  LDLs have been anywhere from 102-125, HDL is high, theoretically protective, but a calcium score scan was elevated at 36  With her family history we discussed LPA assessment, and at this point treating with low-dose statin is likely the best plan, rosuvastatin 5 mg  Agatston CAC score, <100  Score of 36, she was surprised by this, she has a very healthy lifestyle, both by exercise and diet.  But her family history is likely to blame, dad had a CABG at age 62       Plan:    Rosuvastatin 5 mg, check lipids in 3 months  Check lipoprotein a  Repeat lipids in a year and annual follow-up      HPI:   Sandi Hernandez is a 59 y.o. year old female who follows up for cardiovascular preventive health, with celiac disease, still porosis, with mild hypercholesterolemia with  up to 125, high HDL, strong family history of premature heart disease especially in dad with a CABG at 62, a retired nurse, who also saw me last summer for idiopathic hypotension after hysterectomy for postmenopausal ongoing menstrual cycling.  Her hypotension appears to have resolved.  She has PSVT, symptoms are recurrent, fairly frequent but mostly nuisance.  She underwent a calcium score for further risk assessment, she has a score of 36, this was discussed in detail today.      Review of Systems   Constitutional:  Negative for appetite change, diaphoresis, fatigue and fever.   Respiratory:  Negative for chest tightness, shortness of breath and wheezing.    Cardiovascular:  Positive for palpitations. Negative for chest  pain and leg swelling.   Gastrointestinal:  Negative for abdominal pain and blood in stool.   Musculoskeletal:  Negative for arthralgias and joint swelling.   Skin:  Negative for rash.   Neurological:  Negative for dizziness, syncope and light-headedness.       Past Medical History:   Diagnosis Date   • Abnormal uterine bleeding    • Anemia 2003   • Anesthesia complication     difficulty awakening and had trouble maintaing airway but didn't end up needing reintubation   • Anxiety    • Asthma Childhood   • Back pain    • Celiac disease     last assessed: 9/27/2016   • Chicken pox     AS PER PT   • Claustrophobia    • Depression    • Fibroid    • GERD (gastroesophageal reflux disease)    • Kidney stone    • Miscarriage 1998   • Peptic ulceration 12/2022   • Raynaud's disease    • Wears reading eyeglasses      Social History     Substance and Sexual Activity   Alcohol Use Not Currently    Comment: social     Social History     Substance and Sexual Activity   Drug Use Not Currently     Social History     Tobacco Use   Smoking Status Former   • Current packs/day: 0.50   • Average packs/day: 0.5 packs/day for 35.5 years (17.7 ttl pk-yrs)   • Types: Cigarettes   • Start date: 11/1/1989   • Quit date: 6/15/1983   • Passive exposure: Past   Smokeless Tobacco Never   Tobacco Comments    Quit 1989       Allergies:  Allergies   Allergen Reactions   • Barley Grass - Food Allergy Vomiting, Dermatitis, Dizziness, GI Intolerance, Headache, Itching, Lightheadedness and Diarrhea   • Gluten Meal - Food Allergy GI Intolerance   • Oat - Food Allergy Dermatitis, GI Intolerance, Headache and Irritability   • Sulfamethoxazole-Trimethoprim Dizziness, Eye Swelling, Headache, Hives, Itching, Photosensitivity, Swelling and Tinnitus   • Wheat Bran - Food Allergy Diarrhea, Dermatitis, GI Intolerance, Vomiting, Irritability, Anxiety and Headache     All wheat products       Medications:     Current Outpatient Medications:   •  ALPRAZolam (Xanax)  "0.25 mg tablet, Take 1 tablet (0.25 mg total) by mouth 3 (three) times a day as needed (for flying), Disp: 15 tablet, Rfl: 0  •  B-D INTEGRA SYRINGE 23G X 1\" 3 ML MISC, USE EVERY 30 (THIRTY) DAYS, Disp: 3 each, Rfl: 3  •  Cholecalciferol (VITAMIN D3 PO), Take 1 capsule by mouth in the morning, Disp: , Rfl:   •  cyanocobalamin 1,000 mcg/mL, INJECT 1 ML (1,000 MCG TOTAL) INTO A MUSCLE EVERY 30 (THIRTY) DAYS, Disp: 3 mL, Rfl: 6  •  estradiol (VIVELLE-DOT) 0.0375 MG/24HR, Place 1 patch on the skin 2 (two) times a week, Disp: , Rfl:   •  Multiple Vitamins-Minerals (ZINC PO), Take 1 tablet by mouth in the morning, Disp: , Rfl:   •  Progesterone 100 MG CAPS, Take 100 mg by mouth at bedtime, Disp: 30 capsule, Rfl: 11  •  rosuvastatin (CRESTOR) 5 mg tablet, Take 1 tablet (5 mg total) by mouth daily, Disp: 90 tablet, Rfl: 3      Vitals:    04/16/25 0953   BP: 132/74   Pulse: 72   SpO2: 100%     Weight (last 2 days)     Date/Time Weight    04/16/25 0953 67.1 (148)     Weight: verbal at 04/16/25 0953        Physical Exam  Constitutional:       General: She is not in acute distress.     Appearance: Normal appearance. She is not diaphoretic.   HENT:      Head: Normocephalic and atraumatic.   Eyes:      General: No scleral icterus.     Conjunctiva/sclera: Conjunctivae normal.   Neck:      Vascular: No JVD.   Cardiovascular:      Rate and Rhythm: Normal rate and regular rhythm.      Heart sounds: Normal heart sounds. No murmur heard.  Pulmonary:      Effort: Pulmonary effort is normal. No respiratory distress.      Breath sounds: Normal breath sounds. No wheezing, rhonchi or rales.   Musculoskeletal:         General: No tenderness.      Right lower leg: Normal. No edema.      Left lower leg: Normal. No edema.   Skin:     General: Skin is warm and dry.   Neurological:      Mental Status: She is alert. Mental status is at baseline.         Laboratory Studies:    Labs personally reviewed    Cardiac testing:     EKG reviewed " "personally:   No results found for this visit on 04/16/25.      Echocardiogram:  6/24-normal study    Stress tests:  6/24-normal study, 9-1/2 minutes    Catheterization:      Holter:   6/24-PSVT episodes correlating with symptoms, nothing sustained, longest episode 13 beats.  Otherwise low ectopy burden less than 1%    Hakan Sexton MD    Portions of the record may have been created with voice recognition software.  Occasional wrong word or \"sound a like\" substitutions may have occurred due to the inherent limitations of voice recognition software.  Read the chart carefully and recognize, using context, where substitutions have occurred.  "

## 2025-04-17 LAB
CHOLEST SERPL-MCNC: 211 MG/DL (ref 100–199)
CHOLEST/HDLC SERPL: 2.7 RATIO (ref 0–4.4)
HDLC SERPL-MCNC: 79 MG/DL
LDLC SERPL CALC-MCNC: 119 MG/DL (ref 0–99)
SL AMB VLDL CHOLESTEROL CALC: 13 MG/DL (ref 5–40)
TRIGL SERPL-MCNC: 72 MG/DL (ref 0–149)

## 2025-04-18 LAB — LPA SERPL-SCNC: 45 NMOL/L

## 2025-04-22 ENCOUNTER — OFFICE VISIT (OUTPATIENT)
Dept: PODIATRY | Facility: CLINIC | Age: 60
End: 2025-04-22
Payer: COMMERCIAL

## 2025-04-22 ENCOUNTER — HOSPITAL ENCOUNTER (OUTPATIENT)
Dept: RADIOLOGY | Facility: HOSPITAL | Age: 60
Discharge: HOME | End: 2025-04-22
Attending: PODIATRIST
Payer: COMMERCIAL

## 2025-04-22 VITALS — HEIGHT: 64 IN | HEART RATE: 67 BPM | WEIGHT: 147 LBS | OXYGEN SATURATION: 99 % | BODY MASS INDEX: 25.1 KG/M2

## 2025-04-22 DIAGNOSIS — M89.8X7 EXOSTOSIS OF BONE OF FOOT: Primary | ICD-10-CM

## 2025-04-22 DIAGNOSIS — M79.671 RIGHT FOOT PAIN: ICD-10-CM

## 2025-04-22 PROCEDURE — 99213 OFFICE O/P EST LOW 20 MIN: CPT | Performed by: PODIATRIST

## 2025-04-22 PROCEDURE — 73630 X-RAY EXAM OF FOOT: CPT

## 2025-04-22 RX ORDER — CHLORHEXIDINE GLUCONATE ORAL RINSE 1.2 MG/ML
15 SOLUTION DENTAL ONCE
Status: CANCELLED | OUTPATIENT
Start: 2025-04-22 | End: 2025-04-22

## 2025-04-22 RX ORDER — CEFAZOLIN SODIUM 2 G/50ML
2000 SOLUTION INTRAVENOUS ONCE
Status: CANCELLED | OUTPATIENT
Start: 2025-04-29 | End: 2025-04-22

## 2025-04-22 NOTE — PROGRESS NOTES
:  Assessment & Plan  Right foot pain    Orders:    XR foot 3+ vw right; Future    Case request operating room: EXCISION EXOSTOSIS RIGHT FOOT; Standing    Comprehensive metabolic panel; Future    CBC and differential; Future    Exostosis of bone of foot    Orders:    Case request operating room: EXCISION EXOSTOSIS RIGHT FOOT; Standing    Comprehensive metabolic panel; Future    CBC and differential; Future    The patient's clinical examination today significant for palpable and mildly tender exostosis to the base of the right great toe in the area of the base of the proximal phalanx.  There is no erythema nor edema no counter ecchymosis associate the lesion.  There is no pigmentary changes in the skin.  Pulses palpable.    X-rays of the patient's right foot taken today were personally viewed and interpreted.  There are no signs of any bony exostosis noted on plain film study.  This leads me to believe that the lesion may be cartilaginous or fibrous in nature.    As the lesion is causing her discomfort in close to shoe gear and with ambulation, I do recommend its removal.  She is amenable to this and informed consent was obtained today for excision of the exostosis from her right foot.  The    The course was discussed with the patient in detail and she is willing to proceed.  Potential complications include but are not limited to postoperative infection, delays in wound healing, numbness along the incision line, recurrence of the lesion, and potential need for repeat surgery.    We will initiate preoperative testing and scheduling.    History of Present Illness     Sandi Hernandez is a 59 y.o. female   Presents today with a chief complaint of a new onset bump to the base of her right great toe.  She feels that this lesion is causing discomfort and affecting her balance.  She notes pain when wearing closed toed shoes.  She states that the lesion has been present for a couple of months.  There is no history of known  "injury or trauma to the right foot.      Review of Systems   Constitutional: Negative.    HENT: Negative.     Eyes: Negative.    Respiratory: Negative.     Cardiovascular: Negative.    Endocrine: Negative.    Musculoskeletal: Negative.    Neurological: Negative.    Hematological: Negative.    Psychiatric/Behavioral: Negative.       Objective   Pulse 67   Ht 5' 4\" (1.626 m)   Wt 66.7 kg (147 lb)   LMP 08/13/2021 (Approximate)   SpO2 99%   BMI 25.23 kg/m²      Physical Exam  Vitals and nursing note reviewed.   Constitutional:       General: She is not in acute distress.     Appearance: She is well-developed.   HENT:      Head: Normocephalic and atraumatic.   Cardiovascular:      Pulses:           Dorsalis pedis pulses are 2+ on the right side.   Pulmonary:      Effort: Pulmonary effort is normal.   Musculoskeletal:        Feet:    Feet:      Right foot:      Skin integrity: Skin integrity normal.      Comments: The patient's clinical examination today significant for palpable and mildly tender exostosis to the base of the right great toe in the area of the base of the proximal phalanx.  There is no erythema nor edema no counter ecchymosis associate the lesion.  There is no pigmentary changes in the skin.  Pulses palpable.    Skin:     General: Skin is warm and dry.      Capillary Refill: Capillary refill takes less than 2 seconds.   Neurological:      General: No focal deficit present.      Mental Status: She is alert and oriented to person, place, and time.   Psychiatric:         Mood and Affect: Mood normal.           "

## 2025-04-25 ENCOUNTER — RESULTS FOLLOW-UP (OUTPATIENT)
Dept: CARDIOLOGY CLINIC | Facility: CLINIC | Age: 60
End: 2025-04-25

## 2025-04-28 ENCOUNTER — ANESTHESIA EVENT (OUTPATIENT)
Dept: PERIOP | Facility: HOSPITAL | Age: 60
End: 2025-04-28
Payer: COMMERCIAL

## 2025-04-28 ENCOUNTER — TELEPHONE (OUTPATIENT)
Age: 60
End: 2025-04-28

## 2025-04-28 NOTE — TELEPHONE ENCOUNTER
Caller: Sandi Hernandez    Doctor: Dr. Bangura    Reason for call: Sandi is calling to make sure we received her blood work from [facility].  Can someone please reach out to her?  Thank you.     Call back#: [phone]

## 2025-04-29 ENCOUNTER — HOSPITAL ENCOUNTER (OUTPATIENT)
Facility: HOSPITAL | Age: 60
Setting detail: OUTPATIENT SURGERY
Discharge: HOME | End: 2025-04-29
Attending: PODIATRIST | Admitting: PODIATRIST
Payer: COMMERCIAL

## 2025-04-29 ENCOUNTER — ANESTHESIA (OUTPATIENT)
Dept: PERIOP | Facility: HOSPITAL | Age: 60
End: 2025-04-29
Payer: COMMERCIAL

## 2025-04-29 VITALS
OXYGEN SATURATION: 95 % | WEIGHT: 148 LBS | BODY MASS INDEX: 25.27 KG/M2 | HEART RATE: 71 BPM | HEIGHT: 64 IN | TEMPERATURE: 97.5 F | DIASTOLIC BLOOD PRESSURE: 60 MMHG | RESPIRATION RATE: 16 BRPM | SYSTOLIC BLOOD PRESSURE: 119 MMHG

## 2025-04-29 DIAGNOSIS — E53.8 B12 DEFICIENCY: ICD-10-CM

## 2025-04-29 DIAGNOSIS — Z98.890 POST-OPERATIVE STATE: Primary | ICD-10-CM

## 2025-04-29 PROBLEM — F41.9 ANXIETY: Status: ACTIVE | Noted: 2025-04-29

## 2025-04-29 PROBLEM — E78.5 HYPERLIPIDEMIA: Status: ACTIVE | Noted: 2025-04-29

## 2025-04-29 RX ORDER — MAGNESIUM HYDROXIDE 1200 MG/15ML
LIQUID ORAL AS NEEDED
Status: DISCONTINUED | OUTPATIENT
Start: 2025-04-29 | End: 2025-04-29 | Stop reason: HOSPADM

## 2025-04-29 RX ORDER — ALBUTEROL SULFATE 0.83 MG/ML
2.5 SOLUTION RESPIRATORY (INHALATION) ONCE AS NEEDED
Status: DISCONTINUED | OUTPATIENT
Start: 2025-04-29 | End: 2025-04-29 | Stop reason: HOSPADM

## 2025-04-29 RX ORDER — PROPOFOL 10 MG/ML
INJECTION, EMULSION INTRAVENOUS CONTINUOUS PRN
Status: DISCONTINUED | OUTPATIENT
Start: 2025-04-29 | End: 2025-04-29

## 2025-04-29 RX ORDER — FENTANYL CITRATE 50 UG/ML
INJECTION, SOLUTION INTRAMUSCULAR; INTRAVENOUS AS NEEDED
Status: DISCONTINUED | OUTPATIENT
Start: 2025-04-29 | End: 2025-04-29

## 2025-04-29 RX ORDER — SODIUM CHLORIDE, SODIUM LACTATE, POTASSIUM CHLORIDE, CALCIUM CHLORIDE 600; 310; 30; 20 MG/100ML; MG/100ML; MG/100ML; MG/100ML
20 INJECTION, SOLUTION INTRAVENOUS CONTINUOUS
Status: DISCONTINUED | OUTPATIENT
Start: 2025-04-29 | End: 2025-04-29 | Stop reason: HOSPADM

## 2025-04-29 RX ORDER — FENTANYL CITRATE/PF 50 MCG/ML
25 SYRINGE (ML) INJECTION
Status: DISCONTINUED | OUTPATIENT
Start: 2025-04-29 | End: 2025-04-29 | Stop reason: HOSPADM

## 2025-04-29 RX ORDER — METOCLOPRAMIDE HYDROCHLORIDE 5 MG/ML
10 INJECTION INTRAMUSCULAR; INTRAVENOUS ONCE AS NEEDED
Status: DISCONTINUED | OUTPATIENT
Start: 2025-04-29 | End: 2025-04-29 | Stop reason: HOSPADM

## 2025-04-29 RX ORDER — CEFAZOLIN SODIUM 2 G/50ML
2000 SOLUTION INTRAVENOUS ONCE
Status: COMPLETED | OUTPATIENT
Start: 2025-04-29 | End: 2025-04-29

## 2025-04-29 RX ORDER — PROPOFOL 10 MG/ML
INJECTION, EMULSION INTRAVENOUS AS NEEDED
Status: DISCONTINUED | OUTPATIENT
Start: 2025-04-29 | End: 2025-04-29

## 2025-04-29 RX ORDER — OXYCODONE AND ACETAMINOPHEN 5; 325 MG/1; MG/1
1 TABLET ORAL ONCE AS NEEDED
Refills: 0 | Status: DISCONTINUED | OUTPATIENT
Start: 2025-04-29 | End: 2025-04-29 | Stop reason: HOSPADM

## 2025-04-29 RX ORDER — OXYCODONE AND ACETAMINOPHEN 5; 325 MG/1; MG/1
1 TABLET ORAL EVERY 6 HOURS PRN
Qty: 15 TABLET | Refills: 0 | Status: SHIPPED | OUTPATIENT
Start: 2025-04-29 | End: 2025-05-04

## 2025-04-29 RX ORDER — MIDAZOLAM HYDROCHLORIDE 2 MG/2ML
INJECTION, SOLUTION INTRAMUSCULAR; INTRAVENOUS AS NEEDED
Status: DISCONTINUED | OUTPATIENT
Start: 2025-04-29 | End: 2025-04-29

## 2025-04-29 RX ORDER — KETOROLAC TROMETHAMINE 30 MG/ML
INJECTION, SOLUTION INTRAMUSCULAR; INTRAVENOUS AS NEEDED
Status: DISCONTINUED | OUTPATIENT
Start: 2025-04-29 | End: 2025-04-29

## 2025-04-29 RX ORDER — NEEDLES, FILTER 19GX1 1/2"
NEEDLE, DISPOSABLE MISCELLANEOUS
Qty: 3 EACH | Refills: 0 | Status: SHIPPED | OUTPATIENT
Start: 2025-04-29

## 2025-04-29 RX ORDER — SODIUM CHLORIDE, SODIUM LACTATE, POTASSIUM CHLORIDE, CALCIUM CHLORIDE 600; 310; 30; 20 MG/100ML; MG/100ML; MG/100ML; MG/100ML
50 INJECTION, SOLUTION INTRAVENOUS CONTINUOUS
Status: DISCONTINUED | OUTPATIENT
Start: 2025-04-29 | End: 2025-04-29 | Stop reason: HOSPADM

## 2025-04-29 RX ORDER — DEXAMETHASONE SODIUM PHOSPHATE 10 MG/ML
INJECTION, SOLUTION INTRAMUSCULAR; INTRAVENOUS AS NEEDED
Status: DISCONTINUED | OUTPATIENT
Start: 2025-04-29 | End: 2025-04-29

## 2025-04-29 RX ORDER — ONDANSETRON 2 MG/ML
INJECTION INTRAMUSCULAR; INTRAVENOUS AS NEEDED
Status: DISCONTINUED | OUTPATIENT
Start: 2025-04-29 | End: 2025-04-29

## 2025-04-29 RX ORDER — CHLORHEXIDINE GLUCONATE ORAL RINSE 1.2 MG/ML
15 SOLUTION DENTAL ONCE
Status: COMPLETED | OUTPATIENT
Start: 2025-04-29 | End: 2025-04-29

## 2025-04-29 RX ADMIN — ONDANSETRON 4 MG: 2 INJECTION INTRAMUSCULAR; INTRAVENOUS at 12:30

## 2025-04-29 RX ADMIN — FENTANYL CITRATE 25 MCG: 50 INJECTION INTRAMUSCULAR; INTRAVENOUS at 12:31

## 2025-04-29 RX ADMIN — CHLORHEXIDINE GLUCONATE 0.12% ORAL RINSE 15 ML: 1.2 LIQUID ORAL at 11:19

## 2025-04-29 RX ADMIN — MIDAZOLAM 2 MG: 1 INJECTION INTRAMUSCULAR; INTRAVENOUS at 12:19

## 2025-04-29 RX ADMIN — PROPOFOL 50 MG: 10 INJECTION, EMULSION INTRAVENOUS at 12:21

## 2025-04-29 RX ADMIN — CEFAZOLIN SODIUM 2000 MG: 2 SOLUTION INTRAVENOUS at 12:16

## 2025-04-29 RX ADMIN — DEXAMETHASONE SODIUM PHOSPHATE 8 MG: 10 INJECTION, SOLUTION INTRAMUSCULAR; INTRAVENOUS at 12:30

## 2025-04-29 RX ADMIN — FENTANYL CITRATE 25 MCG: 50 INJECTION INTRAMUSCULAR; INTRAVENOUS at 12:28

## 2025-04-29 RX ADMIN — SODIUM CHLORIDE, SODIUM LACTATE, POTASSIUM CHLORIDE, AND CALCIUM CHLORIDE 50 ML/HR: .6; .31; .03; .02 INJECTION, SOLUTION INTRAVENOUS at 11:21

## 2025-04-29 RX ADMIN — KETOROLAC TROMETHAMINE 30 MG: 30 INJECTION, SOLUTION INTRAMUSCULAR at 12:38

## 2025-04-29 RX ADMIN — PROPOFOL 100 MCG/KG/MIN: 10 INJECTION, EMULSION INTRAVENOUS at 12:21

## 2025-04-29 RX ADMIN — FENTANYL CITRATE 50 MCG: 50 INJECTION INTRAMUSCULAR; INTRAVENOUS at 12:23

## 2025-04-29 NOTE — ANESTHESIA PREPROCEDURE EVALUATION
Procedure:  EXCISION EXOSTOSIS RIGHT FOOT (Right: Foot)    Relevant Problems   CARDIO   (+) Hyperlipidemia   (+) PSVT (paroxysmal supraventricular tachycardia) (HCC)      NEURO/PSYCH   (+) Anxiety      Surgery/Wound/Pain   (+) S/P hysterectomy with oophorectomy        Physical Exam    Airway    Mallampati score: II  TM Distance: >3 FB  Neck ROM: full     Dental   No notable dental hx     Cardiovascular  Cardiovascular exam normal    Pulmonary  Pulmonary exam normal     Other Findings  post-pubertal.      Anesthesia Plan  ASA Score- 2     Anesthesia Type- IV sedation with anesthesia with ASA Monitors.         Additional Monitors:     Airway Plan:     Comment: Pt mentioned she is slow to wake up .       Plan Factors-Exercise tolerance (METS): >4 METS.    Chart reviewed. EKG reviewed.  Existing labs reviewed. Patient summary reviewed.    Patient is not a current smoker. Patient not instructed to abstain from smoking on day of procedure. Patient did not smoke on day of surgery.            Induction-     Postoperative Plan- Plan for postoperative opioid use.     Perioperative Resuscitation Plan - Level 1 - Full Code.       Informed Consent- Anesthetic plan and risks discussed with patient and spouse.  I personally reviewed this patient with the CRNA. Discussed and agreed on the Anesthesia Plan with the CRNA..      NPO Status:  Vitals Value Taken Time   Date of last liquid 04/28/25 04/29/25 1100   Time of last liquid 2100 04/29/25 1100   Date of last solid 04/28/25 04/29/25 1100   Time of last solid 2100 04/29/25 1100         Lab Results   Component Value Date    HGBA1C 4.8 07/24/2023       Lab Results   Component Value Date     11/29/2017    K 4.3 04/18/2024     04/18/2024    CO2 27 04/18/2024    BUN 17 04/18/2024    CREATININE 0.79 04/18/2024    GLUCOSE 89 11/29/2017    GLUF 81 08/25/2023    CALCIUM 9.6 09/05/2023    AST 21 04/18/2024    ALT 20 04/18/2024    ALKPHOS 62 09/05/2023    PROT 7.2 11/29/2017     BILITOT 0.3 11/29/2017    EGFR 87 04/18/2024       Lab Results   Component Value Date    WBC 3.8 04/18/2024    HGB 15.0 04/18/2024    HCT 45.6 04/18/2024    MCV 88 04/18/2024     04/18/2024 June 2024 stress test   Stress ECG No ST deviation is noted. Arrhythmias during stress: rare PACs, PVCs in pairs. The ECG was negative for ischemia. The stress ECG is negative for ischemia after maximal exercise, without reproduction of symptoms.   EKG NSR     Holter:   6/24-PSVT episodes correlating with symptoms, nothing sustained, longest episode 13 beats.  Otherwise low ectopy burden less than 1%   SVT being managed conservatively cardiology notes from April 2025 reviewed

## 2025-04-29 NOTE — ANESTHESIA POSTPROCEDURE EVALUATION
Post-Op Assessment Note    CV Status:  Stable  Pain Score: 0    Pain management: adequate    Multimodal analgesia used between 6 hours prior to anesthesia start to PACU discharge    Mental Status:  Alert and awake   Hydration Status:  Euvolemic   PONV Controlled:  Controlled   Airway Patency:  Patent  Two or more mitigation strategies used for obstructive sleep apnea   Post Op Vitals Reviewed: Yes    No anethesia notable event occurred.    Staff: CRNA           Last Filed PACU Vitals:  Vitals Value Taken Time   Temp     Pulse 77 04/29/25 1254   /57 04/29/25 1250   Resp 17 04/29/25 1254   SpO2 96 % 04/29/25 1254   Vitals shown include unfiled device data.

## 2025-04-29 NOTE — OP NOTE
OPERATIVE REPORT - Podiatry  PATIENT NAME: Sandi Hernandez    :  1965  MRN: [id number]  Pt Location: [facility] OR ROOM 03    SURGERY DATE: 2025    Surgeons and Role:     * Luis Bangura DPM - Primary     * Twan Miller DPM - Assisting    Pre-op Diagnosis:  Right foot pain [M79.671]  Exostosis of bone of foot [M89.8X7]    Post-Op Diagnosis Codes:     * Right foot pain [M79.671]     * Exostosis of bone of foot [M89.8X7]    Procedure(s) (LRB):  EXCISION EXOSTOSIS RIGHT FOOT (Right)    Specimen(s):  * No specimens in log *    Estimated Blood Loss:   Minimal    Drains:  * No LDAs found *    Anesthesia Type:   Choice with 10 ml of 1% Lidocaine and 0.5% Bupivacaine in a 1:1 mixture    Hemostasis:  Ankle tourniquet 250mmHg    Materials:  * No implants in log *    3-0 Vicryl  4-0 Vicryl  4-0 Nylon    Operative Findings:  Consistent with pre op diagnosis and imaging    Complications:   None    Procedure and Technique:     Under mild sedation, the patient was brought into the operating room and placed on the operating room table in the supine position. IV sedation was achieved by anesthesia team and a universal timeout was performed where all parties are in agreement of correct patient, correct procedure and correct site. A pneumatic tourniquet was then placed over the patient's right ankle with ample padding. A paige block was performed consisting of 10 ml of 1% Lidocaine and 0.5% Bupivacaine in a 1:1 mixture. The foot was then prepped and draped in the usual aseptic manner. An esmarch bandage was used to exsangunate the foot and the pneumatic tourniquet was then inflated to 250mmHg.    Attention was then directed to the right foot. A linear longitudinal skin incision was then made with a 15 blade over the palpable exostosis on the dorsal 1st proximal phalanx base. Sharp and blunt dissection was then carried through subcutaneous tissues with care taken to protect neurovascular structures and cauterize any bleeders as  "necessary. Periosteum was then reflected off the exostosis and the exostosis was resected with a rongeurs. The exostosis had the appearance of healthy, normal bone. The site was smoothed with a rasp to remove any sharp edges.  The site was irrigated with normal saline using a bulb syringe. Deep closure was obtained using 3-0 Vicryl in interrupted fashion. Subcutaneous closure was obtained using 4-0 Vicryl in interrupted fashion. Skin closure was obtained using 4-0 Nylon in interrupted fashion. The site was dressed with xeroform, 4x4 gauze, rudy, and ACE..    The tourniquet was deflated at approximately 12 min and normal hyperemic response was noted to all digits. The patient tolerated the procedure and anesthesia well without immediate complications and transferred to PACU with vital signs stable.       Dr. Bangura was present during the entire procedure and participated in all key aspects.    SIGNATURE: Twan Miller DPM  DATE: April 29, 2025  TIME: 12:49 PM      Portions of the record may have been created with voice recognition software. Occasional wrong word or \"sound a like\" substitutions may have occurred due to the inherent limitations of voice recognition software. Read the chart carefully and recognize, using context, where substitutions have occurred.    "

## 2025-04-29 NOTE — ANESTHESIA POSTPROCEDURE EVALUATION
Post-Op Assessment Note    CV Status:  Stable    Pain management: adequate       Mental Status:  Alert and awake   Hydration Status:  Euvolemic   PONV Controlled:  Controlled   Airway Patency:  Patent     Post Op Vitals Reviewed: Yes    No anethesia notable event occurred.    Staff: Anesthesiologist           Last Filed PACU Vitals:  Vitals Value Taken Time   Temp 97.7 °F (36.5 °C) 04/29/25 1310   Pulse 65 04/29/25 1316   /63 04/29/25 1310   Resp 37 04/29/25 1315   SpO2 97 % 04/29/25 1316   Vitals shown include unfiled device data.    Modified Rachel:     Vitals Value Taken Time   Activity 2 04/29/25 1310   Respiration 2 04/29/25 1310   Circulation 2 04/29/25 1310   Consciousness 2 04/29/25 1310   Oxygen Saturation 2 04/29/25 1310     Modified Rachel Score: 10

## 2025-04-29 NOTE — DISCHARGE SUMMARY
Discharge Summary Outpatient Procedure Podiatry -   Sandi Hernandez 59 y.o. female MRN: [id number]  Unit/Bed#: OR POOL Encounter: [id number]    Admission Date: 4/29/2025     Admitting Diagnosis: Right foot pain [M79.671]  Exostosis of bone of foot [M89.8X7]    Discharge Diagnosis: same    Procedures Performed: EXCISION EXOSTOSIS RIGHT FOOT: 69376 (CPT®)    Complications: none    Condition at Discharge: stable    Discharge instructions/Information to patient and family:   See after visit summary for information provided to patient and family.      Provisions for Follow-Up Care/Important appointments:  See after visit summary for information related to follow-up care and any pertinent home health orders.      Discharge Medications:  See after visit summary for reconciled discharge medications provided to patient and family.

## 2025-04-29 NOTE — DISCHARGE INSTR - AVS FIRST PAGE
[facility]  Post-Operative Instructions    1. Take your prescribed medication as directed. You can take ibuprofen in between doses of the narcotic if breakthrough pain occurs  2. Upon arrival at home, lie down and elevate your surgical foot on 2 pillows. Unless you're moving from the couch, bed, or bathroom, make sure to elevate the foot. Swelling is not your friend.   3. Remain quiet, off your feet as much as possible, for the first 24-48 hours. This is when your feet first swell and may become painful. After 48 hours you may begin limited walking following these restrictions:   Weightbear as tolerated to surgical foot, wearing the surgical shoe on your right foot  4. Drink large quantities of water. Consume no alcohol. Continue a well-balanced diet.  5. Report any unusual discomfort or fever to this office.  6. A limited amount of discomfort and swelling is to be expected. In some cases the skin may take on a bruised appearance. The surgical solution that was applied to your foot prior to the operation is dark in color and the operation site may appear to be oozing when it actually is not.  7. A slight amount of blood is to be expected, and is no cause for alarm. Do not remove the dressings. If there is active bleeding and if the bleeding persists, add additional gauze to the bandage, apply direct pressure, elevate your feet and call this office.  8. Do not get the dressings wet. As regular bathing may be inconvenient, sponge baths are recommended. If you shower, keep the dressing dry.   9. When anesthesia wears off and if any discomfort should be present, apply an ice pack directly over the operated area for 15 minute intervals for several hours or until the pain leaves. (USE IN EXCESS OF 15 MINUTES COULD CAUSE FROSTBITE). Do not use hot water bags or electric pads. A convenient icepack can be made by placing ice cubes in a plastic bag and covering this with a towel.  10. If necessary, take a mild  laxative before retiring.  11. Wear your special boot anytime you put weight on your foot, even if it is just to walk to the bathroom and back. It will probably be a few weeks before you will be permitted to try regular shoes.  12. Having performed the operation, we are interested in a prompt recovery. Please cooperate by following the above instructions.  13. Please call to confirm your post-op appointment or call with any other questions.

## 2025-04-30 ENCOUNTER — TELEPHONE (OUTPATIENT)
Age: 60
End: 2025-04-30

## 2025-04-30 DIAGNOSIS — N95.1 MENOPAUSAL SYMPTOMS: Primary | ICD-10-CM

## 2025-04-30 DIAGNOSIS — N95.1 VASOMOTOR SYMPTOMS DUE TO MENOPAUSE: ICD-10-CM

## 2025-04-30 NOTE — TELEPHONE ENCOUNTER
"PA for SYRINGE-NEEDLE, DISP, 3 ML (B-D INTEGRA SYRINGE) 23G X 1\" 3 ML MISC SUBMITTED to [employer]    via    []CMM-KEY:   [x]Surescripts-Case ID #   []Availity-Auth ID # NDC #   []Faxed to plan   []Other website   []Phone call Case ID #     []PA sent as URGENT    All office notes, labs and other pertaining documents and studies sent. Clinical questions answered. Awaiting determination from insurance company.     Turnaround time for your insurance to make a decision on your Prior Authorization can take 7-21 business days.                 "

## 2025-04-30 NOTE — TELEPHONE ENCOUNTER
Caller: jaquan    Doctor: Dr. Bangura    Reason for call: patient is requesting a 2nd boot for her operative foot she has a mens small. Can it be possible to have it by this Friday as she has an appointment.  Please advise  Thank you    Call back#: [phone]

## 2025-05-01 RX ORDER — ESTRADIOL 0.04 MG/D
1 PATCH, EXTENDED RELEASE TRANSDERMAL 2 TIMES WEEKLY
Qty: 8 PATCH | Refills: 0 | Status: SHIPPED | OUTPATIENT
Start: 2025-05-01

## 2025-05-01 NOTE — TELEPHONE ENCOUNTER
"Lmom for patient to please return my call.  Please inform patient to call her insurance company because the syringe/needle 23G x 1\",  3 ML  has been excluded from her plan.  Please have her ask what they are replacing it with and let us know so we can order the replacement for her.  Thank You  "

## 2025-05-01 NOTE — TELEPHONE ENCOUNTER
Pt returned call, message in chart relayed.  Patient has been dealing with this for over 5 years.  She stated she is going to reach out to pharmacy and pay out of pocket.  They are fairly inexpensive.

## 2025-05-01 NOTE — TELEPHONE ENCOUNTER
"PA for  SYRINGE-NEEDLE, DISP, 3 ML (B-D INTEGRA SYRINGE) 23G X 1\" 3 ML   EXCLUDED from plan       Reason:(Screenshot if applicable)        Message sent to office clinical pool Yes        "

## 2025-05-02 ENCOUNTER — OFFICE VISIT (OUTPATIENT)
Dept: PODIATRY | Facility: CLINIC | Age: 60
End: 2025-05-02

## 2025-05-02 VITALS — BODY MASS INDEX: 25.4 KG/M2 | HEIGHT: 64 IN | HEART RATE: 73 BPM | OXYGEN SATURATION: 97 %

## 2025-05-02 DIAGNOSIS — M89.8X7 EXOSTOSIS OF BONE OF FOOT: ICD-10-CM

## 2025-05-02 DIAGNOSIS — Z98.890 POST-OPERATIVE STATE: Primary | ICD-10-CM

## 2025-05-02 PROCEDURE — 99024 POSTOP FOLLOW-UP VISIT: CPT | Performed by: PODIATRIST

## 2025-05-02 NOTE — PROGRESS NOTES
":  Assessment & Plan  Post-operative state    Orders:    Darkco Shoe    Exostosis of bone of foot         The patient's clinical examination today is relatively benign.  The incision lines to the right foot is well coapted with all sutures intact.  There are no signs infection.    The incision line was cleansed and then redressed with a dry sterile dressing with Xeroform as a contact layer.  A new surgical shoe was fitted as the one dispensed to her after surgery was much too large and was presenting a fall risk.    Recommend follow-up in 1 week for her next postoperative dressing change.  She will keep the dressing clean dry and intact until follow-up.    History of Present Illness     Sandi Hernandez is a 59 y.o. female   Patient presents today for follow-up status post excision of an exostosis to the dorsal aspect of her right great toe.  She has been doing fairly well from a postoperative standpoint.  Pain is currently well-controlled with ibuprofen.  Does note that her surgical shoe is much too large and she has had to be very careful so as not to trip with it.      Review of Systems   Constitutional: Negative.    Respiratory: Negative.  Negative for shortness of breath.    Cardiovascular:  Negative for chest pain and leg swelling.   Psychiatric/Behavioral: Negative.       Objective   Pulse 73   Ht 5' 4\" (1.626 m)   LMP 08/13/2021 (Approximate)   SpO2 97%   BMI 25.40 kg/m²      Physical Exam  Vitals and nursing note reviewed.   Constitutional:       General: She is not in acute distress.     Appearance: She is well-developed.   HENT:      Head: Normocephalic and atraumatic.   Eyes:      Conjunctiva/sclera: Conjunctivae normal.   Cardiovascular:      Pulses:           Dorsalis pedis pulses are 2+ on the right side.        Posterior tibial pulses are 2+ on the right side.   Pulmonary:      Effort: Pulmonary effort is normal.   Abdominal:      Palpations: Abdomen is soft.   Feet:      Comments: The patient's " clinical examination today is relatively benign.  The incision lines to the right foot is well coapted with all sutures intact.  There are no signs infection.    Skin:     General: Skin is warm and dry.      Capillary Refill: Capillary refill takes less than 2 seconds.   Neurological:      General: No focal deficit present.      Mental Status: She is alert and oriented to person, place, and time.   Psychiatric:         Mood and Affect: Mood normal.

## 2025-05-08 ENCOUNTER — OFFICE VISIT (OUTPATIENT)
Dept: PODIATRY | Facility: CLINIC | Age: 60
End: 2025-05-08

## 2025-05-08 VITALS — HEIGHT: 64 IN | BODY MASS INDEX: 25.1 KG/M2 | HEART RATE: 73 BPM | WEIGHT: 147 LBS | OXYGEN SATURATION: 97 %

## 2025-05-08 DIAGNOSIS — M89.8X7 EXOSTOSIS OF BONE OF FOOT: ICD-10-CM

## 2025-05-08 DIAGNOSIS — Z98.890 POST-OPERATIVE STATE: Primary | ICD-10-CM

## 2025-05-08 PROCEDURE — 99024 POSTOP FOLLOW-UP VISIT: CPT | Performed by: PODIATRIST

## 2025-05-09 NOTE — PROGRESS NOTES
":  Assessment & Plan  Post-operative state         Exostosis of bone of foot         The patient's clinical examination today is also benign.  The incision lines to the right great toe is well coapted with all sutures intact.  There are no signs of infection.  There is no tenderness to palpation or with passive range of motion of the right great toe joint.    The sutures were removed today without complication.  An antimicrobial dressing was applied and is to be maintained for the remainder of the day.  She can transition back to shoe gear and activities as tolerated.    As she is doing well, she can follow-up with me on an as-needed basis.    History of Present Illness     Sandi Hernandez is a 59 y.o. female   The patient presents today for follow-up status post excision of a painful exostosis to the dorsal aspect of her right great toe.  She has been doing well postoperatively and notes no significant pain or discomfort to her right foot.      Review of Systems   Constitutional: Negative.    HENT: Negative.     Eyes: Negative.    Respiratory: Negative.     Cardiovascular: Negative.    Endocrine: Negative.    Musculoskeletal: Negative.    Neurological: Negative.    Hematological: Negative.    Psychiatric/Behavioral: Negative.       Objective   Pulse 73   Ht 5' 4\" (1.626 m)   Wt 66.7 kg (147 lb)   LMP 08/13/2021 (Approximate)   SpO2 97%   BMI 25.23 kg/m²      Physical Exam  Vitals and nursing note reviewed.   Constitutional:       General: She is not in acute distress.     Appearance: She is well-developed.   HENT:      Head: Normocephalic and atraumatic.   Eyes:      Conjunctiva/sclera: Conjunctivae normal.   Cardiovascular:      Pulses:           Dorsalis pedis pulses are 2+ on the right side.        Posterior tibial pulses are 2+ on the right side.   Pulmonary:      Effort: Pulmonary effort is normal.   Abdominal:      Palpations: Abdomen is soft.   Feet:      Comments: The patient's clinical examination " today is also benign.  The incision lines to the right great toe is well coapted with all sutures intact.  There are no signs of infection.  There is no tenderness to palpation or with passive range of motion of the right great toe joint.  Skin:     General: Skin is warm and dry.      Capillary Refill: Capillary refill takes less than 2 seconds.   Neurological:      General: No focal deficit present.      Mental Status: She is alert and oriented to person, place, and time.   Psychiatric:         Mood and Affect: Mood normal.

## 2025-05-20 DIAGNOSIS — N95.1 VASOMOTOR SYMPTOMS DUE TO MENOPAUSE: ICD-10-CM

## 2025-05-20 DIAGNOSIS — N95.1 MENOPAUSAL SYMPTOMS: ICD-10-CM

## 2025-05-20 RX ORDER — ESTRADIOL 0.04 MG/D
1 PATCH, EXTENDED RELEASE TRANSDERMAL 2 TIMES WEEKLY
Qty: 8 PATCH | Refills: 5 | Status: SHIPPED | OUTPATIENT
Start: 2025-05-22

## 2025-06-10 ENCOUNTER — OFFICE VISIT (OUTPATIENT)
Dept: FAMILY MEDICINE CLINIC | Facility: CLINIC | Age: 60
End: 2025-06-10
Payer: COMMERCIAL

## 2025-06-10 VITALS
WEIGHT: 151 LBS | OXYGEN SATURATION: 98 % | RESPIRATION RATE: 16 BRPM | TEMPERATURE: 98.2 F | SYSTOLIC BLOOD PRESSURE: 132 MMHG | HEIGHT: 64 IN | DIASTOLIC BLOOD PRESSURE: 76 MMHG | BODY MASS INDEX: 25.78 KG/M2 | HEART RATE: 67 BPM

## 2025-06-10 DIAGNOSIS — F40.243 ANXIETY WITH FLYING: ICD-10-CM

## 2025-06-10 DIAGNOSIS — Z00.00 ANNUAL PHYSICAL EXAM: Primary | ICD-10-CM

## 2025-06-10 DIAGNOSIS — R92.30 DENSE BREAST: ICD-10-CM

## 2025-06-10 DIAGNOSIS — Z12.31 SCREENING MAMMOGRAM, ENCOUNTER FOR: ICD-10-CM

## 2025-06-10 DIAGNOSIS — Z12.31 ENCOUNTER FOR SCREENING MAMMOGRAM FOR BREAST CANCER: ICD-10-CM

## 2025-06-10 PROBLEM — Z98.890 POST-OPERATIVE STATE: Status: RESOLVED | Noted: 2025-05-02 | Resolved: 2025-06-10

## 2025-06-10 PROCEDURE — 99396 PREV VISIT EST AGE 40-64: CPT | Performed by: FAMILY MEDICINE

## 2025-06-10 RX ORDER — UBIQUINOL 100 MG
100 CAPSULE ORAL DAILY
COMMUNITY
Start: 2025-04-25

## 2025-06-10 RX ORDER — ALPRAZOLAM 0.25 MG
TABLET ORAL
Qty: 15 TABLET | Refills: 0 | Status: SHIPPED | OUTPATIENT
Start: 2025-06-10

## 2025-06-10 NOTE — PROGRESS NOTES
Adult Annual Physical  Name: Sandi Hernandez      : 1965      MRN: [id number]  Encounter Provider: Marilee Polanco DO  Encounter Date: 6/10/2025   Encounter department: [facility]    :  Assessment & Plan  Annual physical exam         Encounter for screening mammogram for breast cancer    Orders:  •  Mammo screening bilateral w 3d and cad; Future    Screening mammogram, encounter for    Orders:  •  US breast screening bilateral complete (ABUS); Future    Dense breast    Orders:  •  US breast screening bilateral complete (ABUS); Future    Anxiety with flying    [place] prescription monitoring program report reviewed and is appropriate.    Orders:  •  ALPRAZolam (Xanax) 0.25 mg tablet; Take 1 pill 30 minutes before flying, may repeat in 4 hours if needed        Preventive Screenings:  - Diabetes Screening: risks/benefits discussed and orders placed  - Cholesterol Screening: screening not indicated and has hyperlipidemia   - Hepatitis C screening: screening up-to-date   - HIV screening: screening up-to-date   - Cervical cancer screening: screening not indicated   - Breast cancer screening: screening up-to-date   - Colon cancer screening: screening up-to-date   - Lung cancer screening: screening not indicated     Immunizations:  - Immunizations due: Prevnar 20 and Zoster (Shingrix)  - Risks/benefits immunizations discussed    - The patient declines recommended vaccines currently despite my recommendations      Counseling/Anticipatory Guidance:    - Dental health: discussed importance of regular tooth brushing, flossing, and dental visits.   - Diet: discussed recommendations for a healthy/well-balanced diet.   - Exercise: the importance of regular exercise/physical activity was discussed. Recommend exercise 3-5 times per week for at least 30 minutes.          History of Present Illness     Adult Annual Physical:  Patient presents for annual physical. She follows with cardiology due to family history. .  "    Diet and Physical Activity:  - Diet/Nutrition: portion control, consuming 3-5 servings of fruits/vegetables daily and adequate fiber intake. celiac diet  - Exercise: 5-7 times a week on average, strength training exercises, walking, moderate cardiovascular exercise and 30-60 minutes on average.    Depression Screening:  - PHQ-2 Score: 0    General Health:  - Sleep: sleeps well and > 8 hours of sleep on average.  - Hearing: normal hearing bilateral ears.  - Vision: wears glasses, vision problems and most recent eye exam < 1 year ago.  - Dental: regular dental visits, brushes teeth twice daily and floss regularly.    /GYN Health:  - Follows with GYN: yes.   - Menopause: postmenopausal.   - History of STDs: no  - Contraception: hysterectomy.      Advanced Care Planning:  - Has an advanced directive?: yes    - Has a durable medical POA?: yes      Review of Systems      Objective   /76 (BP Location: Left arm, Patient Position: Sitting, Cuff Size: Standard)   Pulse 67   Temp 98.2 °F (36.8 °C) (Temporal)   Resp 16   Ht 5' 4\" (1.626 m)   Wt 68.5 kg (151 lb)   LMP 08/13/2021 (Approximate)   SpO2 98%   BMI 25.92 kg/m²     Physical Exam  Vitals and nursing note reviewed.   Constitutional:       Appearance: She is well-developed.   HENT:      Head: Normocephalic and atraumatic.      Right Ear: External ear normal.      Left Ear: External ear normal.      Nose: Nose normal.     Cardiovascular:      Rate and Rhythm: Normal rate and regular rhythm.      Heart sounds: Normal heart sounds. No murmur heard.     No friction rub.   Pulmonary:      Effort: No respiratory distress.      Breath sounds: Normal breath sounds. No wheezing or rales.   Abdominal:      Palpations: Abdomen is soft.      Tenderness: There is no abdominal tenderness.     Musculoskeletal:      Right lower leg: No edema.      Left lower leg: No edema.     Neurological:      Mental Status: She is oriented to person, place, and time.      Cranial " Nerves: No cranial nerve deficit.

## 2025-06-10 NOTE — PATIENT INSTRUCTIONS
"Patient Education     Routine physical for adults   The Basics   Written by the doctors and editors at [facility]   What is a physical? -- A physical is a routine visit, or \"check-up,\" with your doctor. You might also hear it called a \"wellness visit\" or \"preventive visit.\"  During each visit, the doctor will:   Ask about your physical and mental health   Ask about your habits, behaviors, and lifestyle   Do an exam   Give you vaccines if needed   Talk to you about any medicines you take   Give advice about your health   Answer your questions  Getting regular check-ups is an important part of taking care of your health. It can help your doctor find and treat any problems you have. But it's also important for preventing health problems.  A routine physical is different from a \"sick visit.\" A sick visit is when you see a doctor because of a health concern or problem. Since physicals are scheduled ahead of time, you can think about what you want to ask the doctor.  How often should I get a physical? -- It depends on your age and health. In general, for people age 21 years and older:   If you are younger than 50 years, you might be able to get a physical every 3 years.   If you are 50 years or older, your doctor might recommend a physical every year.  If you have an ongoing health condition, like diabetes or high blood pressure, your doctor will probably want to see you more often.  What happens during a physical? -- In general, each visit will include:   Physical exam - The doctor or nurse will check your height, weight, heart rate, and blood pressure. They will also look at your eyes and ears. They will ask about how you are feeling and whether you have any symptoms that bother you.   Medicines - It's a good idea to bring a list of all the medicines you take to each doctor visit. Your doctor will talk to you about your medicines and answer any questions. Tell them if you are having any side effects that bother you. You " "should also tell them if you are having trouble paying for any of your medicines.   Habits and behaviors - This includes:   Your diet   Your exercise habits   Whether you smoke, drink alcohol, or use drugs   Whether you are sexually active   Whether you feel safe at home  Your doctor will talk to you about things you can do to improve your health and lower your risk of health problems. They will also offer help and support. For example, if you want to quit smoking, they can give you advice and might prescribe medicines. If you want to improve your diet or get more physical activity, they can help you with this, too.   Lab tests, if needed - The tests you get will depend on your age and situation. For example, your doctor might want to check your:   Cholesterol   Blood sugar   Iron level   Vaccines - The recommended vaccines will depend on your age, health, and what vaccines you already had. Vaccines are very important because they can prevent certain serious or deadly infections.   Discussion of screening - \"Screening\" means checking for diseases or other health problems before they cause symptoms. Your doctor can recommend screening based on your age, risk, and preferences. This might include tests to check for:   Cancer, such as breast, prostate, cervical, ovarian, colorectal, prostate, lung, or skin cancer   Sexually transmitted infections, such as chlamydia and gonorrhea   Mental health conditions like depression and anxiety  Your doctor will talk to you about the different types of screening tests. They can help you decide which screenings to have. They can also explain what the results might mean.   Answering questions - The physical is a good time to ask the doctor or nurse questions about your health. If needed, they can refer you to other doctors or specialists, too.  Adults older than 65 years often need other care, too. As you get older, your doctor will talk to you about:   How to prevent falling at " home   Hearing or vision tests   Memory testing   How to take your medicines safely   Making sure that you have the help and support you need at home  All topics are updated as new evidence becomes available and our peer review process is complete.  This topic retrieved from [employer] on: May 02, 2024.  Topic 021531 Version 1.0  Release: 32.4.3 - C32.122  © 2024 UpToDate, Inc. and/or its affiliates. All rights reserved.  Consumer Information Use and Disclaimer   Disclaimer: This generalized information is a limited summary of diagnosis, treatment, and/or medication information. It is not meant to be comprehensive and should be used as a tool to help the user understand and/or assess potential diagnostic and treatment options. It does NOT include all information about conditions, treatments, medications, side effects, or risks that may apply to a specific patient. It is not intended to be medical advice or a substitute for the medical advice, diagnosis, or treatment of a health care provider based on the health care provider's examination and assessment of a patient's specific and unique circumstances. Patients must speak with a health care provider for complete information about their health, medical questions, and treatment options, including any risks or benefits regarding use of medications. This information does not endorse any treatments or medications as safe, effective, or approved for treating a specific patient. Up[employer]Date, Inc. and its affiliates disclaim any warranty or liability relating to this information or the use thereof.The use of this information is governed by the Terms of Use, available at https://www.woltersSynclogueuwer.com/en/know/clinical-effectiveness-terms. 2024© UpToDate, Inc. and its affiliates and/or licensors. All rights reserved.  Copyright   © 2024 Up[employer]Date, Inc. and/or its affiliates. All rights reserved.

## 2025-06-17 ENCOUNTER — TELEPHONE (OUTPATIENT)
Dept: MAMMOGRAPHY | Facility: CLINIC | Age: 60
End: 2025-06-17

## 2025-06-17 DIAGNOSIS — N95.1 VASOMOTOR SYMPTOMS DUE TO MENOPAUSE: ICD-10-CM

## 2025-06-17 DIAGNOSIS — N95.1 MENOPAUSAL SYMPTOMS: ICD-10-CM

## 2025-06-17 RX ORDER — ESTRADIOL 0.04 MG/D
PATCH, EXTENDED RELEASE TRANSDERMAL
Qty: 24 PATCH | Refills: 1 | Status: SHIPPED | OUTPATIENT
Start: 2025-06-17

## 2025-07-19 LAB
CHOLEST SERPL-MCNC: 144 MG/DL (ref 100–199)
CHOLEST/HDLC SERPL: 1.8 RATIO (ref 0–4.4)
HDLC SERPL-MCNC: 78 MG/DL
LDLC SERPL CALC-MCNC: 54 MG/DL (ref 0–99)
SL AMB VLDL CHOLESTEROL CALC: 12 MG/DL (ref 5–40)
TRIGL SERPL-MCNC: 54 MG/DL (ref 0–149)

## 2025-07-27 DIAGNOSIS — E53.8 B12 DEFICIENCY: ICD-10-CM

## 2025-07-28 RX ORDER — NEEDLES, FILTER 19GX1 1/2"
NEEDLE, DISPOSABLE MISCELLANEOUS
Qty: 3 EACH | Refills: 1 | Status: SHIPPED | OUTPATIENT
Start: 2025-07-28

## 2025-07-29 DIAGNOSIS — E53.8 B12 DEFICIENCY: ICD-10-CM

## 2025-07-30 RX ORDER — CYANOCOBALAMIN 1000 UG/ML
1000 INJECTION, SOLUTION INTRAMUSCULAR; SUBCUTANEOUS
Qty: 3 ML | Refills: 6 | Status: SHIPPED | OUTPATIENT
Start: 2025-07-30

## (undated) DEVICE — PENCIL ELECTROSURG E-Z CLEAN -0035H

## (undated) DEVICE — ARTHROSCOPY FLOOR MAT

## (undated) DEVICE — LARGE, DISPOSABLE ALEXIS O C-SECTION PROTECTOR - RETRACTOR: Brand: ALEXIS ® O C-SECTION PROTECTOR - RETRACTOR

## (undated) DEVICE — STERILE BETHLEHEM PLASTIC HAND: Brand: CARDINAL HEALTH

## (undated) DEVICE — CUFF TOURNIQUET 18 X 5.5 IN QUICK CONNECT 2BLA

## (undated) DEVICE — TROCAR: Brand: KII SLEEVE

## (undated) DEVICE — PREMIUM DRY TRAY LF: Brand: MEDLINE INDUSTRIES, INC.

## (undated) DEVICE — 3M™ TEGADERM™ TRANSPARENT FILM DRESSING FRAME STYLE, 1624W, 2-3/8 IN X 2-3/4 IN (6 CM X 7 CM), 100/CT 4CT/CASE: Brand: 3M™ TEGADERM™

## (undated) DEVICE — SYRINGE 10ML LL

## (undated) DEVICE — DECANTER: Brand: UNBRANDED

## (undated) DEVICE — STERILE SURGICAL LUBRICANT,  TUBE: Brand: SURGILUBE

## (undated) DEVICE — TRAY FOLEY 16FR URIMETER SILICONE SURESTEP

## (undated) DEVICE — SUT MONOCRYL 4-0 PS-2 18 IN Y496G

## (undated) DEVICE — PAD CAST 4 IN COTTON NON STERILE

## (undated) DEVICE — INTENDED FOR TISSUE SEPARATION, AND OTHER PROCEDURES THAT REQUIRE A SHARP SURGICAL BLADE TO PUNCTURE OR CUT.: Brand: BARD-PARKER SAFETY BLADES SIZE 11, STERILE

## (undated) DEVICE — OCCLUDER COLPO-PNEUMO

## (undated) DEVICE — GLOVE INDICATOR PI UNDERGLOVE SZ 7.5 BLUE

## (undated) DEVICE — ADHESIVE SKIN HIGH VISCOSITY EXOFIN 1ML

## (undated) DEVICE — TROCAR: Brand: KII FIOS FIRST ENTRY

## (undated) DEVICE — SUT VICRYL 4-0 SH 27 IN J415H

## (undated) DEVICE — NEEDLE 25G X 1 1/2

## (undated) DEVICE — SUT VICRYL 2-0 CT-2 27 IN J269H

## (undated) DEVICE — TUBING SUCTION 5MM X 12 FT

## (undated) DEVICE — CHLORAPREP HI-LITE 26ML ORANGE

## (undated) DEVICE — SPONGE 4 X 4 XRAY 16 PLY STRL LF RFD

## (undated) DEVICE — CYSTO TUBING SINGLE IRRIGATION

## (undated) DEVICE — ELECTRODE LAP SPATULA CRV E-Z CLEAN 33CM -0018C

## (undated) DEVICE — TOWEL SURG XR DETECT GREEN STRL RFD

## (undated) DEVICE — GLOVE PI ULTRA TOUCH SZ 6

## (undated) DEVICE — GLOVE INDICATOR PI UNDERGLOVE SZ 6.5 BLUE

## (undated) DEVICE — CHLORHEXIDINE 4PCT 4 OZ

## (undated) DEVICE — PAD GROUNDING DUAL ADULT

## (undated) DEVICE — BETHLEHEM UNIVERSAL GYN LAP PK: Brand: CARDINAL HEALTH

## (undated) DEVICE — HEAVY DUTY TABLE COVER: Brand: CONVERTORS

## (undated) DEVICE — IRRIG ENDO FLO TUBING

## (undated) DEVICE — 10FR FRAZIER SUCTION HANDLE: Brand: CARDINAL HEALTH

## (undated) DEVICE — GLOVE INDICATOR PI UNDERGLOVE SZ 8 BLUE

## (undated) DEVICE — ENSEAL LAPAROSCOPIC TISSUE SEALER G2 ARTICULATING  CURVED JAW FOR USE WITH G2 GENERATOR 5MM DIAMETER 35CM SHAFT LENGTH: Brand: ENSEAL

## (undated) DEVICE — GLOVE SRG BIOGEL 7.5

## (undated) DEVICE — ANTIBACTERIAL UNDYED BRAIDED (POLYGLACTIN 910), SYNTHETIC ABSORBABLE SUTURE: Brand: COATED VICRYL

## (undated) DEVICE — MAYO STAND COVER: Brand: CONVERTORS

## (undated) DEVICE — SYRINGE 50ML LL

## (undated) DEVICE — SUT ETHILON 4-0 P-S 18 IN 699H

## (undated) DEVICE — GAUZE SPONGES,USP TYPE VII GAUZE, 12 PLY: Brand: CURITY

## (undated) DEVICE — INTENDED FOR TISSUE SEPARATION, AND OTHER PROCEDURES THAT REQUIRE A SHARP SURGICAL BLADE TO PUNCTURE OR CUT.: Brand: BARD-PARKER ® CARBON RIB-BACK BLADES

## (undated) DEVICE — BLUE HEAT SCOPE WARMER

## (undated) DEVICE — 1820 FOAM BLOCK NEEDLE COUNTER: Brand: DEVON

## (undated) DEVICE — U-DRAPE: Brand: CONVERTORS